# Patient Record
Sex: FEMALE | Race: WHITE | HISPANIC OR LATINO | Employment: OTHER | URBAN - METROPOLITAN AREA
[De-identification: names, ages, dates, MRNs, and addresses within clinical notes are randomized per-mention and may not be internally consistent; named-entity substitution may affect disease eponyms.]

---

## 2017-03-24 LAB
HBA1C MFR BLD: 5.6 % (ref 4.8–5.6)
T3 SERPL-MCNC: 101 NG/DL (ref 71–180)
T4 FREE SERPL-MCNC: 1.24 NG/DL (ref 0.82–1.77)
TSH SERPL DL<=0.005 MIU/L-ACNC: 0.9 UIU/ML (ref 0.45–4.5)

## 2017-04-15 LAB
ALBUMIN SERPL BCP-MCNC: 4.4 G/DL (ref 3.2–4.9)
ALBUMIN/GLOB SERPL: 1.5 G/DL
ALP SERPL-CCNC: 42 U/L (ref 30–99)
ALT SERPL-CCNC: 11 U/L (ref 2–50)
ANION GAP SERPL CALC-SCNC: 8 MMOL/L (ref 0–11.9)
AST SERPL-CCNC: 16 U/L (ref 12–45)
BASOPHILS # BLD AUTO: 0.4 % (ref 0–1.8)
BASOPHILS # BLD: 0.03 K/UL (ref 0–0.12)
BILIRUB SERPL-MCNC: 0.5 MG/DL (ref 0.1–1.5)
BUN SERPL-MCNC: 11 MG/DL (ref 8–22)
CALCIUM SERPL-MCNC: 9.4 MG/DL (ref 8.5–10.5)
CHLORIDE SERPL-SCNC: 106 MMOL/L (ref 96–112)
CO2 SERPL-SCNC: 25 MMOL/L (ref 20–33)
CREAT SERPL-MCNC: 0.99 MG/DL (ref 0.5–1.4)
EOSINOPHIL # BLD AUTO: 0.15 K/UL (ref 0–0.51)
EOSINOPHIL NFR BLD: 2.2 % (ref 0–6.9)
ERYTHROCYTE [DISTWIDTH] IN BLOOD BY AUTOMATED COUNT: 43.3 FL (ref 35.9–50)
GFR SERPL CREATININE-BSD FRML MDRD: 57 ML/MIN/1.73 M 2
GLOBULIN SER CALC-MCNC: 2.9 G/DL (ref 1.9–3.5)
GLUCOSE SERPL-MCNC: 96 MG/DL (ref 65–99)
HCT VFR BLD AUTO: 43.4 % (ref 37–47)
HGB BLD-MCNC: 14.8 G/DL (ref 12–16)
IMM GRANULOCYTES # BLD AUTO: 0.01 K/UL (ref 0–0.11)
IMM GRANULOCYTES NFR BLD AUTO: 0.1 % (ref 0–0.9)
LIPASE SERPL-CCNC: 38 U/L (ref 11–82)
LYMPHOCYTES # BLD AUTO: 2.46 K/UL (ref 1–4.8)
LYMPHOCYTES NFR BLD: 36.6 % (ref 22–41)
MCH RBC QN AUTO: 28.8 PG (ref 27–33)
MCHC RBC AUTO-ENTMCNC: 34.1 G/DL (ref 33.6–35)
MCV RBC AUTO: 84.4 FL (ref 81.4–97.8)
MONOCYTES # BLD AUTO: 0.38 K/UL (ref 0–0.85)
MONOCYTES NFR BLD AUTO: 5.7 % (ref 0–13.4)
NEUTROPHILS # BLD AUTO: 3.69 K/UL (ref 2–7.15)
NEUTROPHILS NFR BLD: 55 % (ref 44–72)
NRBC # BLD AUTO: 0 K/UL
NRBC BLD AUTO-RTO: 0 /100 WBC
PLATELET # BLD AUTO: 260 K/UL (ref 164–446)
PMV BLD AUTO: 10.1 FL (ref 9–12.9)
POTASSIUM SERPL-SCNC: 4.1 MMOL/L (ref 3.6–5.5)
PROT SERPL-MCNC: 7.3 G/DL (ref 6–8.2)
RBC # BLD AUTO: 5.14 M/UL (ref 4.2–5.4)
SODIUM SERPL-SCNC: 139 MMOL/L (ref 135–145)
WBC # BLD AUTO: 6.7 K/UL (ref 4.8–10.8)

## 2017-04-15 PROCEDURE — 80053 COMPREHEN METABOLIC PANEL: CPT

## 2017-04-15 PROCEDURE — 36415 COLL VENOUS BLD VENIPUNCTURE: CPT

## 2017-04-15 PROCEDURE — 99284 EMERGENCY DEPT VISIT MOD MDM: CPT

## 2017-04-15 PROCEDURE — 83690 ASSAY OF LIPASE: CPT

## 2017-04-15 PROCEDURE — 85025 COMPLETE CBC W/AUTO DIFF WBC: CPT

## 2017-04-16 ENCOUNTER — HOSPITAL ENCOUNTER (EMERGENCY)
Facility: MEDICAL CENTER | Age: 62
End: 2017-04-16
Attending: EMERGENCY MEDICINE
Payer: MEDICARE

## 2017-04-16 ENCOUNTER — APPOINTMENT (OUTPATIENT)
Dept: RADIOLOGY | Facility: MEDICAL CENTER | Age: 62
End: 2017-04-16
Attending: EMERGENCY MEDICINE
Payer: MEDICARE

## 2017-04-16 VITALS
WEIGHT: 154.1 LBS | HEART RATE: 60 BPM | HEIGHT: 63 IN | OXYGEN SATURATION: 95 % | DIASTOLIC BLOOD PRESSURE: 76 MMHG | SYSTOLIC BLOOD PRESSURE: 122 MMHG | TEMPERATURE: 99 F | BODY MASS INDEX: 27.3 KG/M2 | RESPIRATION RATE: 18 BRPM

## 2017-04-16 DIAGNOSIS — R10.84 GENERALIZED ABDOMINAL PAIN: ICD-10-CM

## 2017-04-16 DIAGNOSIS — K59.00 CONSTIPATION, UNSPECIFIED CONSTIPATION TYPE: ICD-10-CM

## 2017-04-16 DIAGNOSIS — F41.8 SITUATIONAL ANXIETY: ICD-10-CM

## 2017-04-16 PROCEDURE — A9270 NON-COVERED ITEM OR SERVICE: HCPCS | Performed by: EMERGENCY MEDICINE

## 2017-04-16 PROCEDURE — 74177 CT ABD & PELVIS W/CONTRAST: CPT

## 2017-04-16 PROCEDURE — 700102 HCHG RX REV CODE 250 W/ 637 OVERRIDE(OP): Performed by: EMERGENCY MEDICINE

## 2017-04-16 PROCEDURE — 700117 HCHG RX CONTRAST REV CODE 255: Performed by: EMERGENCY MEDICINE

## 2017-04-16 RX ORDER — DICYCLOMINE HCL 20 MG
20 TABLET ORAL ONCE
Status: COMPLETED | OUTPATIENT
Start: 2017-04-16 | End: 2017-04-16

## 2017-04-16 RX ORDER — DICYCLOMINE HCL 20 MG
20 TABLET ORAL EVERY 6 HOURS
Qty: 30 TAB | Refills: 0 | Status: SHIPPED | OUTPATIENT
Start: 2017-04-16 | End: 2017-04-26

## 2017-04-16 RX ADMIN — DICYCLOMINE HYDROCHLORIDE 20 MG: 20 TABLET ORAL at 02:46

## 2017-04-16 RX ADMIN — IOHEXOL 100 ML: 350 INJECTION, SOLUTION INTRAVENOUS at 02:15

## 2017-04-16 ASSESSMENT — PAIN SCALES - GENERAL: PAINLEVEL_OUTOF10: 8

## 2017-04-16 NOTE — DISCHARGE INSTRUCTIONS
Please follow up with a primary physician for blood pressure management, diabetic screening, and all other preventive health concerns.     THE EXACT CAUSE OF YOUR PAIN IS UNCLEAR--THIS MIGHT BE EARLY IN THE DISEASE PROCESS AND WE ARE UNABLE TO IDENTIFY IT AT THIS TIME (SUCH AS EARLY APPENDICITIS, FOR EXAMPLE).  SEE YOUR DOCTOR OR RETURN TO ER IN THE MORNING (OR 8-12 HRS) UNLESS YOU ARE FEELING COMPLETELY BETTER, RETURN SOONER FOR PAIN/NAUSEA NOT CONTROLLED BY MEDS, FEVER, ANY OTHER CONCERN.    PLENTY OF FLUIDS--PEDIALYTE IS BEST.  ADVANCE DIET AS TOLERATED.    NO ALCOHOL.  NO DRIVING WITH THE MEDICATIONS YOU HAVE BEEN PRESCRIBED.    Dolor abdominal, versión ampliada  (Abdominal Pain, Nonspecific)  El análisis podría no mostrar la razón exacta por la que tiene dolor abdominal. Debido a que hay muchas causas distintas de dolor abdominal, se podrá necesitar otro control y más análisis. Es muy importante el seguimiento para observar los síntomas duraderos (persistentes) o los que empeoran. Demetrius causa posible de dolor abdominal en cualquier persona que aún tiene bobo apéndice es la apendicitis aguda. La apendicitis es a menudo difícil de diagnosticar. Los análisis de angela, orina, ultrasonido y tomografía computada no pueden descartar por completo la apendicitis u otra causas de dolor abdominal. A veces, sólo los cambios que se producen a través del tiempo permitirán determinar si el dolor abdominal se debe al apendicitis o a otras causas. Otros problemas potenciales que pueden requerir cirugía también pueden sebas algún tiempo hasta ser evidentes. Debido a esto, es importante seguir todas las instrucciones de más abajo.  INSTRUCCIONES PARA EL CUIDADO DOMICILIARIO  · Descanse todo lo que pueda.   · No ingiera alimentos sólidos hasta que el dolor desaparezca.   · Cuando un adulto o un chasity siente dolor: Puede beneficiarlo demetrius dieta basada en agua, té liviano descafeinado, caldo o consomé, gelatina, solución de rehidratación  oral, helados de agua o trocitos de hielo.   · Cuando el adulto o el chasity no sienten más dolor: Consuma demetrius dieta liviana (tostadas secas, crackers, jugo de manzana o arroz lewis). Incorpore más alimentos lentamente, siempre que esto no le cause ningún trastorno. No consuma productos lácteos (incluyendo queso y huevos) ni ingiera alimentos condimentados, grasos, fritos o con gran cantidad de fibra.   · No consuma alcohol, cafeína ni cigarrillos.   · [place] rossy medicamentos regularmente, excepto que el profesional le indique lo contrario.   · Utilice los medicamentos de venta juju o de prescripción para el dolor, el malestar o la fiebre, según se lo indique el profesional que lo asiste.   · Utilice los medicamentos de venta juju o de prescripción para el dolor, el malestar o la fiebre, según se lo indique el profesional que lo asiste. No administre aspirina a los niños.   Si el médico le ha dado fecha para demetrius visita de control, es importante que concurra. No cumplir con cory control puede cecil yas resultado que el daño, el dolor o la discapacidad adam permanentes (crónicos). Si tiene problemas para asistir al control, deberá comunicarlo en cory establecimiento para recibir asesoramiento.   SOLICITE ATENCIÓN MÉDICA DE INMEDIATO SI:  · Usted o bobo chasity montes sufrido dolor por más de 24 horas.   · El dolor empeora, cambia de lugar o se siente diferente.   · Usted o bobo chasity tienen demetrius temperatura oral de más de 102° F (38.9° C) y no puede ser controlada con medicamentos.   · Bobo bebé tiene más de 3 meses y bobo temperatura rectal es de 102° F (38.9° C) o más.   · Bobo bebé tiene 3 meses o menos y bobo temperatura rectal es de 100.4° F (38° C) o más.   · Usted o bobo hijo tienen escalofríos.   · Continúan con vómitos y no pueden retener líquidos.   · Observa angela en el vómito o en la materia fecal.   · Las heces son oscuras o negras.   · Los movimientos intestinales son frecuentes.   · Los movimientos intestinales se detienen  (hay demetrius obstrucción) o no pueden eliminarse los gases.   · Siente dolor al orinar o lo hace con frecuencia u observa angela en la orina.   · La piel y la earl nahed de los ojos cambian de color y se tornan amarillos.   · Observa que el estómago se hincha o está más anil.   · Sienten mareos o desmayos.   · Sienten dolor en el pecho o la espalda.   ESTÉ SEGURO QUE:   · Comprende las instrucciones para el jake médica.   · Controlará bobo enfermedad.   · Solicitará atención médica de inmediato según las indicaciones.   Document Released: 03/26/2009 Document Revised: 03/11/2013  Valut[employer]® Patient Information ©2013 [employer].

## 2017-04-16 NOTE — ED NOTES
"Assumed care of patient. Patient complains of epigastric pain x one month and claims \"I don't feel right in my stomach\". Patient claims that when she sits up her stomach moves up.  Patient alert and oriented x 4. Patient denies any other complaints at this time. Patient side rails up x 1 and call bell within reach.    "

## 2017-04-16 NOTE — ED AVS SNAPSHOT
Bsmark Access Code: [email or web address]  Expires: 5/16/2017  2:38 AM    Your email address is not on file at [facility].  Email Addresses are required for you to sign up for Bsmark, please contact [phone] to verify your personal information and to provide your email address prior to attempting to register for Bsmark.    Sena Vazquez LeonIsabel MAZA [address]  [place] [address]    Bsmark  A secure, online tool to manage your health information     [facility]’s Bsmark® is a secure, online tool that connects you to your personalized health information from the privacy of your home -- day or night - making it very easy for you to manage your healthcare. Once the activation process is completed, you can even access your medical information using the Bsmark karan, which is available for free in the Apple Karan store or Google Play store.     To learn more about Bsmark, visit [email or web address]    There are two levels of access available (as shown below):   My Chart Features  [facility] Primary Care Doctor [facility]  Specialists [facility]  Urgent  Care Non-[facility] Primary Care Doctor   Email your healthcare team securely and privately 24/7 X X X    Manage appointments: schedule your next appointment; view details of past/upcoming appointments X      Request prescription refills. X      View recent personal medical records, including lab and immunizations X X X X   View health record, including health history, allergies, medications X X X X   Read reports about your outpatient visits, procedures, consult and ER notes X X X X   See your discharge summary, which is a recap of your hospital and/or ER visit that includes your diagnosis, lab results, and care plan X X  X     How to register for Bsmark:  Once your e-mail address has been verified, follow the following steps to sign up for Bsmark.     1. Go to  [email or web address]  2. Click on the Sign Up Now box, which takes you to the New Member Sign Up page.  You will need to provide the following information:  a. Enter your [email or web address] Access Code exactly as it appears at the top of this page. (You will not need to use this code after you’ve completed the sign-up process. If you do not sign up before the expiration date, you must request a new code.)   b. Enter your date of birth.   c. Enter your home email address.   d. Click Submit, and follow the next screen’s instructions.  3. Create a Evergramt ID. This will be your [email or web address] login ID and cannot be changed, so think of one that is secure and easy to remember.  4. Create a [email or web address].com password. You can change your password at any time.  5. Enter your Password Reset Question and Answer. This can be used at a later time if you forget your password.   6. Enter your e-mail address. This allows you to receive e-mail notifications when new information is available in [email or web address].  7. Click Sign Up. You can now view your health information.    For assistance activating your [email or web address] account, call [phone]

## 2017-04-16 NOTE — ED NOTES
Patient given discharge paperwork and verbalized understanding. Patient out of ED ambulatory with family member. Patient in stable condition and vitals stable and no distress noted.

## 2017-04-16 NOTE — ED AVS SNAPSHOT
4/16/2017    Sena Shepherd  [address]  [facility] [address]    Dear Sena:    [facility] wants to ensure your discharge home is safe and you or your loved ones have had all of your questions answered regarding your care after you leave the hospital.    Below is a list of resources and contact information should you have any questions regarding your hospital stay, follow-up instructions, or active medical symptoms.    Questions or Concerns Regarding… Contact   Medical Questions Related to Your Discharge  (7 days a week, 8am-5pm) Contact a Nurse Care Coordinator   [phone]   Medical Questions Not Related to Your Discharge  (24 hours a day / 7 days a week)  Contact the Nurse Health Line   [phone]    Medications or Discharge Instructions Refer to your discharge packet   or contact your [facility] Primary Care Provider   [phone]   Follow-up Appointment(s) Schedule your appointment via [employer]   or contact Scheduling [phone]   Billing Review your statement via [employer]  or contact Billing [phone]   Medical Records Review your records via [employer]   or contact Medical Records [phone]     You may receive a telephone call within two days of discharge. This call is to make certain you understand your discharge instructions and have the opportunity to have any questions answered. You can also easily access your medical information, test results and upcoming appointments via the [employer] free online health management tool. You can learn more and sign up at Big Contacts/Little Borrowed Dress. For assistance setting up your [employer] account, please call [phone].    Once again, we want to ensure your discharge home is safe and that you have a clear understanding of any next steps in your care. If you have any questions or concerns, please do not hesitate to contact us, we are here for you. Thank you for choosing [facility] for your healthcare needs.    Sincerely,    Your [facility] Healthcare Team

## 2017-04-16 NOTE — ED AVS SNAPSHOT
Home Care Instructions                                                                                                                Sena Shepherd   MRN: [id number]    Department:  [facility], Emergency Dept   Date of Visit:  4/15/2017            [facility], Emergency Dept    [address] [place] [address]    Phone:  [phone]      You were seen by     Paty Horton M.D.      Your Diagnosis Was     Generalized abdominal pain     R10.84       These are the medications you received during your hospitalization from 04/15/2017 2051 to 04/16/2017 0238     Date/Time Order Dose Route Action    04/16/2017 0215 iohexol (OMNIPAQUE) 350 mg/mL 100 mL Intravenous Given      Follow-up Information     1. Follow up with Moses Handy M.D.. Schedule an appointment as soon as possible for a visit in 1 week.    Specialty:  Internal Medicine    Why:  for recheck    Contact information    [address]  [address]  [facility] [address]  [phone]          2. Follow up with [facility], Emergency Dept.    Specialty:  Emergency Medicine    Why:  worsening pain, fever, vomiting or other concerns    Contact information    [address] [facility] [address] [phone]      Medication Information     Review all of your home medications and newly ordered medications with your primary doctor and/or pharmacist as soon as possible. Follow medication instructions as directed by your doctor and/or pharmacist.     Please keep your complete medication list with you and share with your physician. Update the information when medications are discontinued, doses are changed, or new medications (including over-the-counter products) are added; and carry medication information at all times in the event of emergency situations.               Medication List      START taking these medications        Instructions    Morning Afternoon Evening Bedtime    dicyclomine 20 MG Tabs   Commonly known as:  BENTYL        Take 1 Tab by mouth every 6 hours.   Dose:  20 mg                          ASK your doctor about these medications        Instructions    Morning Afternoon Evening Bedtime    albuterol 108 (90 BASE) MCG/ACT Aers inhalation aerosol        Inhale 2 Puffs by mouth every four hours as needed.   Dose:  2 Puff                        ATENOLOL PO        Take  by mouth.                        estradiol 0.0375 MG/24HR patch   Commonly known as:  CLIMARA        Apply 1 Patch to skin as directed every 7 days.   Dose:  1 Patch                        fexofenadine 30 MG tablet   Commonly known as:  ALLEGRA        Take 1 Tab by mouth 2 times a day.   Dose:  30 mg                        lorazepam 1 MG Tabs   Commonly known as:  ATIVAN        Take 1 Tab by mouth every 8 hours as needed for Anxiety.   Dose:  1 mg                        olanzapine 10 MG tablet   Commonly known as:  ZYPREXA        Take 10 mg by mouth every evening.   Dose:  10 mg                        Progesterone Micronized 10 % Crea        Apply  to skin as directed.                        QUETIAPINE FUMARATE PO        Take  by mouth.                        ranitidine 150 MG Tabs   Commonly known as:  ZANTAC        Take 150 mg by mouth 2 times a day.   Dose:  150 mg                        sertraline 25 MG tablet   Commonly known as:  ZOLOFT        Take 25 mg by mouth every day.   Dose:  25 mg                             Where to Get Your Medications      You can get these medications from any pharmacy     Bring a paper prescription for each of these medications    - dicyclomine 20 MG Tabs            Procedures and tests performed during your visit     CARDIAC MONITORING    CBC WITH DIFFERENTIAL    COMP METABOLIC PANEL    CONSENT FOR CONTRAST INJECTION    CT-ABDOMEN-PELVIS WITH    ESTIMATED GFR    IV Saline Lock    LIPASE    O2 Protocol    SALINE LOCK        Discharge Instructions       Please follow up with a  primary physician for blood pressure management, diabetic screening, and all other preventive health concerns.     THE EXACT CAUSE OF YOUR PAIN IS UNCLEAR--THIS MIGHT BE EARLY IN THE DISEASE PROCESS AND WE ARE UNABLE TO IDENTIFY IT AT THIS TIME (SUCH AS EARLY APPENDICITIS, FOR EXAMPLE).  SEE YOUR DOCTOR OR RETURN TO ER IN THE MORNING (OR 8-12 HRS) UNLESS YOU ARE FEELING COMPLETELY BETTER, RETURN SOONER FOR PAIN/NAUSEA NOT CONTROLLED BY MEDS, FEVER, ANY OTHER CONCERN.    PLENTY OF FLUIDS--PEDIALYTE IS BEST.  ADVANCE DIET AS TOLERATED.    NO ALCOHOL.  NO DRIVING WITH THE MEDICATIONS YOU HAVE BEEN PRESCRIBED.    Dolor abdominal, versión ampliada  (Abdominal Pain, Nonspecific)  El análisis podría no mostrar la razón exacta por la que tiene dolor abdominal. Debido a que hay muchas causas distintas de dolor abdominal, se podrá necesitar otro control y más análisis. Es muy importante el seguimiento para observar los síntomas duraderos (persistentes) o los que empeoran. Demetrius causa posible de dolor abdominal en cualquier persona que aún tiene bobo apéndice es la apendicitis aguda. La apendicitis es a menudo difícil de diagnosticar. Los análisis de angela, orina, ultrasonido y tomografía computada no pueden descartar por completo la apendicitis u otra causas de dolor abdominal. A veces, sólo los cambios que se producen a través del tiempo permitirán determinar si el dolor abdominal se debe al apendicitis o a otras causas. Otros problemas potenciales que pueden requerir cirugía también pueden sebas algún tiempo hasta ser evidentes. Debido a esto, es importante seguir todas las instrucciones de más abajo.  INSTRUCCIONES PARA EL CUIDADO DOMICILIARIO  · Descanse todo lo que pueda.   · No ingiera alimentos sólidos hasta que el dolor desaparezca.   · Cuando un adulto o un chasity siente dolor: Puede beneficiarlo demetrius dieta basada en agua, té liviano descafeinado, caldo o consomé, gelatina, solución de rehidratación oral, helados de agua o  trocitos de hielo.   · Cuando el adulto o el chasity no sienten más dolor: Consuma demetrius dieta liviana (tostadas secas, crackers, jugo de manzana o arroz lewis). Incorpore más alimentos lentamente, siempre que esto no le cause ningún trastorno. No consuma productos lácteos (incluyendo queso y huevos) ni ingiera alimentos condimentados, grasos, fritos o con gran cantidad de fibra.   · No consuma alcohol, cafeína ni cigarrillos.   · [place] rossy medicamentos regularmente, excepto que el profesional le indique lo contrario.   · Utilice los medicamentos de venta juju o de prescripción para el dolor, el malestar o la fiebre, según se lo indique el profesional que lo asiste.   · Utilice los medicamentos de venta juju o de prescripción para el dolor, el malestar o la fiebre, según se lo indique el profesional que lo asiste. No administre aspirina a los niños.   Si el médico le ha dado fecha para demetrius visita de control, es importante que concurra. No cumplir con cory control puede cecil yas resultado que el daño, el dolor o la discapacidad adam permanentes (crónicos). Si tiene problemas para asistir al control, deberá comunicarlo en cory establecimiento para recibir asesoramiento.   SOLICITE ATENCIÓN MÉDICA DE INMEDIATO SI:  · Usted o bobo chasity montes sufrido dolor por más de 24 horas.   · El dolor empeora, cambia de lugar o se siente diferente.   · Usted o bobo chasity tienen demetrius temperatura oral de más de 102° F (38.9° C) y no puede ser controlada con medicamentos.   · Bobo bebé tiene más de 3 meses y bobo temperatura rectal es de 102° F (38.9° C) o más.   · Bobo bebé tiene 3 meses o menos y bobo temperatura rectal es de 100.4° F (38° C) o más.   · Usted o bobo hijo tienen escalofríos.   · Continúan con vómitos y no pueden retener líquidos.   · Observa angela en el vómito o en la materia fecal.   · Las heces son oscuras o negras.   · Los movimientos intestinales son frecuentes.   · Los movimientos intestinales se detienen (hay demetrius obstrucción) o no  pueden eliminarse los gases.   · Siente dolor al orinar o lo hace con frecuencia u observa angela en la orina.   · La piel y la earl nahed de los ojos cambian de color y se tornan amarillos.   · Observa que el estómago se hincha o está más anil.   · Sienten mareos o desmayos.   · Sienten dolor en el pecho o la espalda.   ESTÉ SEGURO QUE:   · Comprende las instrucciones para el jake médica.   · Controlará bobo enfermedad.   · Solicitará atención médica de inmediato según las indicaciones.   Document Released: 03/26/2009 Document Revised: 03/11/2013  ExitCare® Patient Information ©2013 [employer].          Patient Information     Patient Information    Following emergency treatment: all patient requiring follow-up care must return either to a private physician or a clinic if your condition worsens before you are able to obtain further medical attention, please return to the emergency room.     Billing Information    At [facility], we work to make the billing process streamlined for our patients.  Our Representatives are here to answer any questions you may have regarding your hospital bill.  If you have insurance coverage and have supplied your insurance information to us, we will submit a claim to your insurer on your behalf.  Should you have any questions regarding your bill, we can be reached online or by phone as follows:  Online: You are able pay your bills online or live chat with our representatives about any billing questions you may have. We are here to help Monday - Friday from 8:00am to 7:30pm and 9:00am - 12:00pm on Saturdays.  Please visit [email or web address]  for more information.   Phone:  [phone] or [phone]    Please note that your emergency physician, surgeon, pathologist, radiologist, anesthesiologist, and other specialists are not employed by [facility] and will therefore bill separately for their services.  Please contact them directly for any  questions concerning their bills at the numbers below:     Emergency Physician Services:  [phone]  [facility]:  [phone]  [facility]:  [phone]  [facility]:  [phone]    1. Your final bill may vary from the amount quoted upon discharge if all procedures are not complete at that time, or if your doctor has additional procedures of which we are not aware. You will receive an additional bill if you return to the Emergency Department at [facility] for suture removal regardless of the facility of which the sutures were placed.     2. Please arrange for settlement of this account at the emergency registration.    3. All self-pay accounts are due in full at the time of treatment.  If you are unable to meet this obligation then payment is expected within 4-5 days.     4. If you have had radiology studies (CT, X-ray, Ultrasound, MRI), you have received a preliminary result during your emergency department visit. Please contact the radiology department [phone] to receive a copy of your final result. Please discuss the Final result with your primary physician or with the follow up physician provided.     Crisis Hotline:  [place] Crisis Hotline:  5-912-QQNVZHI or 1-942.930.4385  [place] Crisis Hotline:    1-893.585.3504 or 247-406-4993         ED Discharge Follow Up Questions    1. In order to provide you with very good care, we would like to follow up with a phone call in the next few days.  May we have your permission to contact you?     YES /  NO    2. What is the best phone number to call you? (       )_____-__________    3. What is the best time to call you?      Morning  /  Afternoon  /  Evening                   Patient Signature:  ____________________________________________________________    Date:  ____________________________________________________________      Your appointments     Apr 26, 2017 10:00 AM   Established Patient with Perla  PAMELA Whalen M.D.   [facility] ([facility])    [address], [address]  [place] [address] [phone]           You will be receiving a confirmation call a few days before your appointment from our automated call confirmation system.

## 2017-04-16 NOTE — ED NOTES
"Sena Vazquez Joao  61 y.o. female  Chief Complaint   Patient presents with   • Abdominal Pain     on and off X 1 year. This episode X 1 wk. Pt describes it as a \"pressure.\"      Pt amb to triage with steady gait for above complaint. Pt states, \"When I lay down it feels like my stomach moves.\" Symptoms began aprox 1 year ago s/p cholecystectomy. Pt denies V/D/C. Per pt, pt experiences nausea when her \"stomach moves.\"  Pt placed in lobby. Pt educated on triage process. Pt encouraged to alert staff for any changes.    "

## 2017-04-16 NOTE — ED PROVIDER NOTES
"ED Provider Note    Scribed for Paty Horton M.D. by Marisela Rausch. 4/16/2017, 12:54 AM.    Primary care provider: Moses Handy M.D.  Means of arrival: Walk-In  History obtained from: Patient  History limited by: None    CHIEF COMPLAINT  Chief Complaint   Patient presents with   • Abdominal Pain     on and off X 1 year. This episode X 1 wk. Pt describes it as a \"pressure.\"      HPI  Sena Shepherd is a 61 y.o. female who presents to the Emergency Department for evaluation of worsening, acute on chronic, abdominal pain.  The patient has suffered from intermittent abdominal pain for the last year.  However, over the last week she has suffered from fairly constant discomfort.  The patient compares her abdominal pain as a heavy pressure.  She describes the sensation to \"everything in her stomach falling to one side\" when she lies down or sits up.  The patient occasionally suffers from constipation. She has not tried to take stool softeners.  The patient has not developed associated nausea, vomiting, fevers, chills, dysuria, or diarrhea.  Just over a year ago the patient underwent a cholecystectomy.  She otherwise has not had abdominal surgeries.  The patient has not met with her gastroenterologist since then.  Her chronic medical history includes GERD, hypertension, and hyperlipidemia.  The patient denies additional symptoms or further pertinent medical history.     REVIEW OF SYSTEMS  Pertinent positives include abdominal discomfort. Pertinent negatives include no nausea, vomiting, fevers, chills, dysuria, diarrhea.  All other systems reviewed and negative. C.     PAST MEDICAL HISTORY   has a past medical history of Arthralgia; Depression; GERD (gastroesophageal reflux disease); Vitamin d deficiency; Menopause; Hypothyroid; Hyperlipidemia; and Hypertension.    SURGICAL HISTORY   has past surgical history that includes cholecystectomy and cholecystectomy (2/2016).    SOCIAL HISTORY  Social " "History   Substance Use Topics   • Smoking status: Never Smoker    • Smokeless tobacco: Never Used   • Alcohol Use: No      History   Drug Use No     FAMILY HISTORY  No family history noted.     CURRENT MEDICATIONS  No current facility-administered medications on file prior to encounter.     Current Outpatient Prescriptions on File Prior to Encounter   Medication Sig Dispense Refill   • Progesterone Micronized 10 % Cream Apply  to skin as directed.     • estradiol (CLIMARA) 0.0375 MG/24HR patch Apply 1 Patch to skin as directed every 7 days.     • olanzapine (ZYPREXA) 10 MG tablet Take 10 mg by mouth every evening.     • sertraline (ZOLOFT) 25 MG tablet Take 25 mg by mouth every day.     • lorazepam (ATIVAN) 1 MG Tab Take 1 Tab by mouth every 8 hours as needed for Anxiety. 10 Tab 0   • albuterol (VENTOLIN OR PROVENTIL) 108 (90 BASE) MCG/ACT Aero Soln inhalation aerosol Inhale 2 Puffs by mouth every four hours as needed. 1 Inhaler 0   • fexofenadine (ALLEGRA) 30 MG tablet Take 1 Tab by mouth 2 times a day. 10 Tab 0   • ranitidine (ZANTAC) 150 MG Tab Take 150 mg by mouth 2 times a day.       ALLERGIES  Allergies   Allergen Reactions   • Nkda [No Known Drug Allergy]      PHYSICAL EXAM  VITAL SIGNS: /91 mmHg  Pulse 73  Temp(Src) 37.2 °C (99 °F) (Temporal)  Resp 18  Ht 1.6 m (5' 3\")  Wt 69.9 kg (154 lb 1.6 oz)  BMI 27.30 kg/m2  SpO2 96%  Constitutional: Alert in no apparent distress.  HENT: No signs of trauma, Bilateral external ears normal, Nose normal.   Eyes: Pupils are equal and reactive, Conjunctiva normal, Non-icteric.   Neck: Normal range of motion, No tenderness, Supple, No stridor.   Cardiovascular: Regular rate and rhythm, no murmurs.   Thorax & Lungs: Normal breath sounds, No respiratory distress, No wheezing, No chest tenderness.   Abdomen: Bowel sounds normal, Soft, no focal tenderness, No masses, No peritoneal signs.  Skin: Warm, Dry, No erythema, No rash.   Musculoskeletal:  No major " deformities noted.   Neurologic: Alert, moving all extremities without difficulty, no focal deficits.    LABS  Results for orders placed or performed during the hospital encounter of 04/16/17   CBC WITH DIFFERENTIAL   Result Value Ref Range    WBC 6.7 4.8 - 10.8 K/uL    RBC 5.14 4.20 - 5.40 M/uL    Hemoglobin 14.8 12.0 - 16.0 g/dL    Hematocrit 43.4 37.0 - 47.0 %    MCV 84.4 81.4 - 97.8 fL    MCH 28.8 27.0 - 33.0 pg    MCHC 34.1 33.6 - 35.0 g/dL    RDW 43.3 35.9 - 50.0 fL    Platelet Count 260 164 - 446 K/uL    MPV 10.1 9.0 - 12.9 fL    Neutrophils-Polys 55.00 44.00 - 72.00 %    Lymphocytes 36.60 22.00 - 41.00 %    Monocytes 5.70 0.00 - 13.40 %    Eosinophils 2.20 0.00 - 6.90 %    Basophils 0.40 0.00 - 1.80 %    Immature Granulocytes 0.10 0.00 - 0.90 %    Nucleated RBC 0.00 /100 WBC    Neutrophils (Absolute) 3.69 2.00 - 7.15 K/uL    Lymphs (Absolute) 2.46 1.00 - 4.80 K/uL    Monos (Absolute) 0.38 0.00 - 0.85 K/uL    Eos (Absolute) 0.15 0.00 - 0.51 K/uL    Baso (Absolute) 0.03 0.00 - 0.12 K/uL    Immature Granulocytes (abs) 0.01 0.00 - 0.11 K/uL    NRBC (Absolute) 0.00 K/uL   COMP METABOLIC PANEL   Result Value Ref Range    Sodium 139 135 - 145 mmol/L    Potassium 4.1 3.6 - 5.5 mmol/L    Chloride 106 96 - 112 mmol/L    Co2 25 20 - 33 mmol/L    Anion Gap 8.0 0.0 - 11.9    Glucose 96 65 - 99 mg/dL    Bun 11 8 - 22 mg/dL    Creatinine 0.99 0.50 - 1.40 mg/dL    Calcium 9.4 8.5 - 10.5 mg/dL    AST(SGOT) 16 12 - 45 U/L    ALT(SGPT) 11 2 - 50 U/L    Alkaline Phosphatase 42 30 - 99 U/L    Total Bilirubin 0.5 0.1 - 1.5 mg/dL    Albumin 4.4 3.2 - 4.9 g/dL    Total Protein 7.3 6.0 - 8.2 g/dL    Globulin 2.9 1.9 - 3.5 g/dL    A-G Ratio 1.5 g/dL   LIPASE   Result Value Ref Range    Lipase 38 11 - 82 U/L   ESTIMATED GFR   Result Value Ref Range    GFR If African American >60 >60 mL/min/1.73 m 2    GFR If Non  57 (A) >60 mL/min/1.73 m 2     All labs reviewed by me.    RADIOLOGY  CT-ABDOMEN-PELVIS WITH   Final Result       1.  No acute findings.   2.  Stable low-density liver lesions, likely cysts.   3.  Uterine fibroid again noted, unchanged.   4.  Normal appendix.        The radiologist's interpretation of all radiological studies have been reviewed by me.    COURSE & MEDICAL DECISION MAKING  Pertinent Labs & Imaging studies reviewed. (See chart for details)    12:54 AM - Patient seen and examined at bedside.  The patient was informed that, due to the chronic nature of her symptoms as well as her description of her symptoms, the patient likely presents with a condition that does not need immediate surgical intervention.  The patient understands that she may not receive an official diagnosis tonight and will need to follow up with a specialist.  However, she was assured that I will order a comprehensive workup to look at the possible causes of her symptoms.  Together we will develop an appropriate plan of care.  All initial questions and concerns were addressed.  The patient understands and agrees with the plan.  Ordered a CT-Abdomen Pelvis, Estimated GFR, CBC, CMP, Lipase, and POC Urinalysis to evaluate her symptoms.     2:09 AM- At this time the patient's ED work up is complete.  I extensively reviewed the lab results, images, and radiologist's interpretations, see above.  The patient will be updated shortly.     2:15 AM - Re-examined; The patient is resting in bed comfortably. The patient's labs are all within normal limits CT scan is unremarkable. I suspect that she has bowel spasm or constipation causing her pain. She seems quite anxious and this may play a role in her symptomatology. I discussed her above findings and plans for discharge with a prescription for Bentyl. The patient was instructed to return to the ED if her symptoms worsen. She will follow up with Dr. Handy in one week.  The patient will receive further information to take home.  All questions and concerns were addressed.  Patient understands and  agrees.       The patient will return for new or worsening symptoms and is stable at the time of discharge. Patient was given return precautions. Patient and/or family member verbalizes understanding and will comply.    DISPOSITION:  Patient will be discharged home in stable condition.    FOLLOW UP:  Moses Handy M.D.  [address]  [address]  [place] [address]  [phone]    Schedule an appointment as soon as possible for a visit in 1 week  for recheck    [facility], Emergency Dept  [address] [address] [phone]    worsening pain, fever, vomiting or other concerns    OUTPATIENT MEDICATIONS:  Discharge Medication List as of 4/16/2017  2:38 AM      START taking these medications    Details   dicyclomine (BENTYL) 20 MG Tab Take 1 Tab by mouth every 6 hours., Disp-30 Tab, R-0, Print Rx Paper           FINAL IMPRESSION  1. Generalized abdominal pain    2. Constipation, unspecified constipation type    3. Situational anxiety         This dictation has been created using voice recognition software and/or scribes. The accuracy of the dictation is limited by the abilities of the software and the expertise of the scribes. I expect there may be some errors of grammar and possibly content. I made every attempt to manually correct the errors within my dictation. However, errors related to voice recognition software and/or scribes may still exist and should be interpreted within the appropriate context.     IMarisela (Scribe), am scribing for, and in the presence of, Paty Horton M.D..    Electronically signed by: Marisela Rausch (Scribe), 4/16/2017    IPaty M.D. personally performed the services described in this documentation, as scribed by Marisela Rausch in my presence, and it is both accurate and complete.    The note accurately reflects work and decisions made by me.  Paty Horton  4/16/2017  4:31 AM

## 2017-04-26 ENCOUNTER — OFFICE VISIT (OUTPATIENT)
Dept: ENDOCRINOLOGY | Facility: MEDICAL CENTER | Age: 62
End: 2017-04-26
Payer: MEDICARE

## 2017-04-26 VITALS
OXYGEN SATURATION: 96 % | BODY MASS INDEX: 27.94 KG/M2 | HEART RATE: 69 BPM | SYSTOLIC BLOOD PRESSURE: 108 MMHG | HEIGHT: 62 IN | WEIGHT: 151.8 LBS | DIASTOLIC BLOOD PRESSURE: 68 MMHG

## 2017-04-26 DIAGNOSIS — E05.90 SUBCLINICAL HYPERTHYROIDISM: ICD-10-CM

## 2017-04-26 PROCEDURE — 1036F TOBACCO NON-USER: CPT | Performed by: INTERNAL MEDICINE

## 2017-04-26 PROCEDURE — G8419 CALC BMI OUT NRM PARAM NOF/U: HCPCS | Performed by: INTERNAL MEDICINE

## 2017-04-26 PROCEDURE — 99214 OFFICE O/P EST MOD 30 MIN: CPT | Performed by: INTERNAL MEDICINE

## 2017-04-26 PROCEDURE — G8432 DEP SCR NOT DOC, RNG: HCPCS | Performed by: INTERNAL MEDICINE

## 2017-04-26 PROCEDURE — 3017F COLORECTAL CA SCREEN DOC REV: CPT | Performed by: INTERNAL MEDICINE

## 2017-04-26 PROCEDURE — 3014F SCREEN MAMMO DOC REV: CPT | Mod: 8P | Performed by: INTERNAL MEDICINE

## 2017-04-26 RX ORDER — MIRTAZAPINE 15 MG/1
15 TABLET, FILM COATED ORAL NIGHTLY
COMMUNITY
End: 2017-05-29

## 2017-04-26 NOTE — PROGRESS NOTES
Endocrinology Clinic Progress Note  PCP: Moses Handy M.D.    CC: Subclinical hyperthyroidism    HPI:  Mireya Pathak is a 62 y.o. old patient who comes in today for routine follow up. At last clinic visit I advise her to discontinue methimazole as she was on a very small dose of 1.25 mg daily and TSH has never been below 0.1. In the past a few months she is randomly taking methimazole on some days and some days she does not take it. She feels that methimazole makes her overall feels better. She feels anxious. She has occasional palpitations.    ROS:  Constitutional: No unintentional weight loss  Cardiac: Positive for palpitations    Past Medical History:  Patient Active Problem List    Diagnosis Date Noted   • Elevated glucose 11/13/2015   • Goiter 11/13/2015   • Hyperlipidemia 09/08/2015   • Sore throat 10/23/2014   • Vitamin D deficiency 07/13/2014   • Chest pain 07/09/2014   • Hyperthyroidism 10/04/2012   • Depression with somatization 11/09/2009       Medications:    Current outpatient prescriptions:   •  mirtazapine (REMERON) 15 MG Tab, Take 15 mg by mouth every evening., Disp: , Rfl:   •  ATENOLOL PO, Take  by mouth., Disp: , Rfl:   •  QUETIAPINE FUMARATE PO, Take  by mouth., Disp: , Rfl:   •  Progesterone Micronized 10 % Cream, Apply  to skin as directed., Disp: , Rfl:   •  albuterol (VENTOLIN OR PROVENTIL) 108 (90 BASE) MCG/ACT Aero Soln inhalation aerosol, Inhale 2 Puffs by mouth every four hours as needed., Disp: 1 Inhaler, Rfl: 0  •  fexofenadine (ALLEGRA) 30 MG tablet, Take 1 Tab by mouth 2 times a day., Disp: 10 Tab, Rfl: 0  •  ranitidine (ZANTAC) 150 MG Tab, Take 150 mg by mouth 2 times a day., Disp: , Rfl:     Labs:   Results for MIREAY PATHAK (MRN [id number]) as of 4/26/2017 11:53   Ref. Range 5/19/2016 04:56 6/16/2016 09:13 3/22/2017 10:10   TSH Latest Ref Range: 0.450-4.500 uIU/mL 0.800 0.874 0.902   Free T-4 Latest Ref Range: 0.82-1.77 ng/dL 0.94 0.98 1.24  "  T3 Latest Ref Range:  ng/dL  115 101     Physical Examination:  Vital signs: /68 mmHg  Pulse 69  Ht 1.575 m (5' 2\")  Wt 68.856 kg (151 lb 12.8 oz)  BMI 27.76 kg/m2  SpO2 96%  General: No apparent distress, cooperative  Eyes: No scleral icterus, no discharge, normal eyelids  Neck: No abnormal masses on inspection   Resp: Normal effort  Psych: Alert and oriented    Assessment and Plan:    1. Subclinical hyperthyroidism  · We had a detailed discussion about treatment of subclinical hyperthyroidism, our plan for now is to discontinue methimazole and repeat labs in 4 weeks, if TSH comes back suppressed and she needs to be on treatment for hyperthyroidism she is leaning towards radioactive iodine ablation, she does not want to keep taking methimazole; we discussed pros and cons of methimazole versus radioactive iodine ablation versus thyroidectomy  · Advised to repeat TSH and free T4 every one month  - TSH; Future  - FREE THYROXINE; Future  - FREE THYROXINE; Future  - TSH; Future    This was a 25 minutes face-to-face encounter, greater than 50% of the time was spent in counseling.    Return in about 2 months (around 6/26/2017).    Thank you for allowing me to participate in the care of this patient.    Perla Whalen M.D.    CC:   Moses Handy M.D.    This note was created using voice recognition software (Dragon). The accuracy of the dictation is limited by the abilities of the software. I have reviewed the note prior to signing, however some errors in grammar and context are still possible. If you have any questions related to this note please do not hesitate to contact our office.     "

## 2017-04-26 NOTE — MR AVS SNAPSHOT
"        Sena Shepherd   2017 10:00 AM   Office Visit   MRN: [id number]    Department:  [facility]   Dept Phone:  [phone]    Description:  Female : 1955   Provider:  Perla Whalen M.D.           Reason for Visit     Follow-Up Hyperthyroidism      Allergies as of 2017     Allergen Noted Reactions    Nkda [No Known Drug Allergy] 2009         You were diagnosed with     Subclinical hyperthyroidism   [014760]         Vital Signs     Blood Pressure Pulse Height Weight Body Mass Index Oxygen Saturation    108/68 mmHg 69 1.575 m (5' 2\") 68.856 kg (151 lb 12.8 oz) 27.76 kg/m2 96%    Smoking Status                   Never Smoker            Basic Information     Date Of Birth Sex Race Ethnicity Preferred Language Language for Written Material    1955 Female White  Origin (Persian,English,Jamaican,Peruvian, etc) English Persian      Your appointments     2017  7:20 AM   Established Patient with Perla Whalen M.D.   [facility]    [address] [facility], [address]  [place] [id number] [phone]           You will be receiving a confirmation call a few days before your appointment from our automated call confirmation system.              Problem List              ICD-10-CM Priority Class Noted - Resolved    Depression with somatization F32.9, F45.0   2009 - Present    Hyperthyroidism E05.90   10/4/2012 - Present    Chest pain (Chronic) R07.9   2014 - Present    Vitamin D deficiency E55.9   2014 - Present    Sore throat J02.9   10/23/2014 - Present    Hyperlipidemia E78.5   2015 - Present    Elevated glucose R73.09   2015 - Present    Goiter E04.9   2015 - Present      Health Maintenance        Date Due Completion Dates    MAMMOGRAM 2010, 2009    COLONOSCOPY 2015    IMM ZOSTER VACCINE 2017 ---    PAP SMEAR 2017 (Prv Comp)   " Override on 7/19/2014: Previously completed    IMM DTaP/Tdap/Td Vaccine (2 - Td) 8/26/2023 8/26/2013            Current Immunizations     Tdap Vaccine 8/26/2013  7:05 PM      Below and/or attached are the medications your provider expects you to take. Review all of your home medications and newly ordered medications with your provider and/or pharmacist. Follow medication instructions as directed by your provider and/or pharmacist. Please keep your medication list with you and share with your provider. Update the information when medications are discontinued, doses are changed, or new medications (including over-the-counter products) are added; and carry medication information at all times in the event of emergency situations     Allergies:  NKDA - (reactions not documented)               Medications  Valid as of: April 26, 2017 - 10:44 AM    Generic Name Brand Name Tablet Size Instructions for use    Albuterol Sulfate (Aero Soln) albuterol 108 (90 BASE) MCG/ACT Inhale 2 Puffs by mouth every four hours as needed.        Atenolol   Take  by mouth.        Dicyclomine HCl (Tab) BENTYL 20 MG Take 1 Tab by mouth every 6 hours.        Estradiol (PATCH WEEKLY) CLIMARA 0.0375 MG/24HR Apply 1 Patch to skin as directed every 7 days.        Fexofenadine HCl (Tab) ALLEGRA 30 MG Take 1 Tab by mouth 2 times a day.        LORazepam (Tab) ATIVAN 1 MG Take 1 Tab by mouth every 8 hours as needed for Anxiety.        Mirtazapine (Tab) REMERON 15 MG Take 15 mg by mouth every evening.        OLANZapine (Tab) ZYPREXA 10 MG Take 10 mg by mouth every evening.        Progesterone Micronized (Cream) Progesterone Micronized 10 % Apply  to skin as directed.        QUEtiapine Fumarate   Take  by mouth.        RaNITidine HCl (Tab) ZANTAC 150 MG Take 150 mg by mouth 2 times a day.        Sertraline HCl (Tab) ZOLOFT 25 MG Take 25 mg by mouth every day.        .                 Medicines prescribed today were sent to:     [id number] - [place]  [place] [address] [place] [address]    Phone: [phone] Fax: [phone]    Open 24 Hours?: No      Medication refill instructions:       If your prescription bottle indicates you have medication refills left, it is not necessary to call your provider’s office. Please contact your pharmacy and they will refill your medication.    If your prescription bottle indicates you do not have any refills left, you may request refills at any time through one of the following ways: The online [employer] system (except Urgent Care), by calling your provider’s office, or by asking your pharmacy to contact your provider’s office with a refill request. Medication refills are processed only during regular business hours and may not be available until the next business day. Your provider may request additional information or to have a follow-up visit with you prior to refilling your medication.   *Please Note: Medication refills are assigned a new Rx number when refilled electronically. Your pharmacy may indicate that no refills were authorized even though a new prescription for the same medication is available at the pharmacy. Please request the medicine by name with the pharmacy before contacting your provider for a refill.        Your To Do List     Future Labs/Procedures Complete By Expires    FREE THYROXINE  As directed 4/26/2018    FREE THYROXINE  As directed 4/26/2018    TSH  As directed 4/26/2018    TSH  As directed 4/26/2018         MyChart Access Code: [email or web address]  Expires: 5/16/2017  2:38 AM    [employer]  A secure, online tool to manage your health information     [employer]® is a secure, online tool that connects you to your personalized health information from the privacy of your home -- day or night - making it very easy for you to manage your healthcare. Once the activation process is completed, you can even access your medical information using  the Snapt karan, which is available for free in the Apple Karan store or Google Play store.     Snapt provides the following levels of access (as shown below):   My Chart Features   [facility] Doctor [facility]  Specialists [facility]  Urgent  Care Non-[facility]  Primary Care  Doctor   Email your healthcare team securely and privately 24/7 X X X    Manage appointments: schedule your next appointment; view details of past/upcoming appointments X      Request prescription refills. X      View recent personal medical records, including lab and immunizations X X X X   View health record, including health history, allergies, medications X X X X   Read reports about your outpatient visits, procedures, consult and ER notes X X X X   See your discharge summary, which is a recap of your hospital and/or ER visit that includes your diagnosis, lab results, and care plan. X X       How to register for Snapt:  1. Go to  [email or web address].  2. Click on the Sign Up Now box, which takes you to the New Member Sign Up page. You will need to provide the following information:  a. Enter your Snapt Access Code exactly as it appears at the top of this page. (You will not need to use this code after you’ve completed the sign-up process. If you do not sign up before the expiration date, you must request a new code.)   b. Enter your date of birth.   c. Enter your home email address.   d. Click Submit, and follow the next screen’s instructions.  3. Create a Snapt ID. This will be your Snapt login ID and cannot be changed, so think of one that is secure and easy to remember.  4. Create a Snapt password. You can change your password at any time.  5. Enter your Password Reset Question and Answer. This can be used at a later time if you forget your password.   6. Enter your e-mail address. This allows you to receive e-mail notifications when new information is available in Snapt.  7. Click Sign Up. You can now view your health  information.    For assistance activating your [employer] account, call [phone]

## 2017-05-22 ENCOUNTER — APPOINTMENT (OUTPATIENT)
Dept: RADIOLOGY | Facility: MEDICAL CENTER | Age: 62
End: 2017-05-22
Attending: PHYSICIAN ASSISTANT
Payer: MEDICARE

## 2017-05-29 ENCOUNTER — HOSPITAL ENCOUNTER (EMERGENCY)
Facility: MEDICAL CENTER | Age: 62
End: 2017-05-29
Attending: EMERGENCY MEDICINE
Payer: MEDICARE

## 2017-05-29 VITALS
OXYGEN SATURATION: 97 % | TEMPERATURE: 98.4 F | HEART RATE: 74 BPM | BODY MASS INDEX: 26.09 KG/M2 | WEIGHT: 147.27 LBS | SYSTOLIC BLOOD PRESSURE: 122 MMHG | RESPIRATION RATE: 14 BRPM | DIASTOLIC BLOOD PRESSURE: 65 MMHG | HEIGHT: 63 IN

## 2017-05-29 DIAGNOSIS — F41.8 SITUATIONAL ANXIETY: ICD-10-CM

## 2017-05-29 DIAGNOSIS — R10.13 EPIGASTRIC ABDOMINAL PAIN: ICD-10-CM

## 2017-05-29 LAB
ALBUMIN SERPL BCP-MCNC: 4.6 G/DL (ref 3.2–4.9)
ALBUMIN/GLOB SERPL: 1.6 G/DL
ALP SERPL-CCNC: 45 U/L (ref 30–99)
ALT SERPL-CCNC: 11 U/L (ref 2–50)
ANION GAP SERPL CALC-SCNC: 11 MMOL/L (ref 0–11.9)
APPEARANCE UR: CLEAR
AST SERPL-CCNC: 14 U/L (ref 12–45)
BASOPHILS # BLD AUTO: 0.4 % (ref 0–1.8)
BASOPHILS # BLD: 0.03 K/UL (ref 0–0.12)
BILIRUB SERPL-MCNC: 1 MG/DL (ref 0.1–1.5)
BILIRUB UR QL STRIP.AUTO: NEGATIVE
BUN SERPL-MCNC: 8 MG/DL (ref 8–22)
CALCIUM SERPL-MCNC: 9.6 MG/DL (ref 8.5–10.5)
CHLORIDE SERPL-SCNC: 103 MMOL/L (ref 96–112)
CO2 SERPL-SCNC: 23 MMOL/L (ref 20–33)
COLOR UR: COLORLESS
CREAT SERPL-MCNC: 0.82 MG/DL (ref 0.5–1.4)
CULTURE IF INDICATED INDCX: NO UA CULTURE
EOSINOPHIL # BLD AUTO: 0.07 K/UL (ref 0–0.51)
EOSINOPHIL NFR BLD: 0.9 % (ref 0–6.9)
ERYTHROCYTE [DISTWIDTH] IN BLOOD BY AUTOMATED COUNT: 41.6 FL (ref 35.9–50)
GFR SERPL CREATININE-BSD FRML MDRD: >60 ML/MIN/1.73 M 2
GLOBULIN SER CALC-MCNC: 2.8 G/DL (ref 1.9–3.5)
GLUCOSE SERPL-MCNC: 111 MG/DL (ref 65–99)
GLUCOSE UR STRIP.AUTO-MCNC: NEGATIVE MG/DL
HCT VFR BLD AUTO: 44 % (ref 37–47)
HGB BLD-MCNC: 15.4 G/DL (ref 12–16)
IMM GRANULOCYTES # BLD AUTO: 0.03 K/UL (ref 0–0.11)
IMM GRANULOCYTES NFR BLD AUTO: 0.4 % (ref 0–0.9)
KETONES UR STRIP.AUTO-MCNC: NEGATIVE MG/DL
LEUKOCYTE ESTERASE UR QL STRIP.AUTO: NEGATIVE
LIPASE SERPL-CCNC: 22 U/L (ref 11–82)
LYMPHOCYTES # BLD AUTO: 1.55 K/UL (ref 1–4.8)
LYMPHOCYTES NFR BLD: 19.8 % (ref 22–41)
MCH RBC QN AUTO: 29.8 PG (ref 27–33)
MCHC RBC AUTO-ENTMCNC: 35 G/DL (ref 33.6–35)
MCV RBC AUTO: 85.3 FL (ref 81.4–97.8)
MICRO URNS: NORMAL
MONOCYTES # BLD AUTO: 0.43 K/UL (ref 0–0.85)
MONOCYTES NFR BLD AUTO: 5.5 % (ref 0–13.4)
NEUTROPHILS # BLD AUTO: 5.71 K/UL (ref 2–7.15)
NEUTROPHILS NFR BLD: 73 % (ref 44–72)
NITRITE UR QL STRIP.AUTO: NEGATIVE
NRBC # BLD AUTO: 0 K/UL
NRBC BLD AUTO-RTO: 0 /100 WBC
PH UR STRIP.AUTO: 6.5 [PH]
PLATELET # BLD AUTO: 271 K/UL (ref 164–446)
PMV BLD AUTO: 9.7 FL (ref 9–12.9)
POTASSIUM SERPL-SCNC: 3.4 MMOL/L (ref 3.6–5.5)
PROT SERPL-MCNC: 7.4 G/DL (ref 6–8.2)
PROT UR QL STRIP: NEGATIVE MG/DL
RBC # BLD AUTO: 5.16 M/UL (ref 4.2–5.4)
RBC UR QL AUTO: NEGATIVE
SODIUM SERPL-SCNC: 137 MMOL/L (ref 135–145)
SP GR UR STRIP.AUTO: 1
TROPONIN I SERPL-MCNC: <0.01 NG/ML (ref 0–0.04)
WBC # BLD AUTO: 7.8 K/UL (ref 4.8–10.8)

## 2017-05-29 PROCEDURE — 85025 COMPLETE CBC W/AUTO DIFF WBC: CPT

## 2017-05-29 PROCEDURE — 84484 ASSAY OF TROPONIN QUANT: CPT

## 2017-05-29 PROCEDURE — 36415 COLL VENOUS BLD VENIPUNCTURE: CPT

## 2017-05-29 PROCEDURE — 700102 HCHG RX REV CODE 250 W/ 637 OVERRIDE(OP): Performed by: EMERGENCY MEDICINE

## 2017-05-29 PROCEDURE — 99284 EMERGENCY DEPT VISIT MOD MDM: CPT

## 2017-05-29 PROCEDURE — 81003 URINALYSIS AUTO W/O SCOPE: CPT

## 2017-05-29 PROCEDURE — 83690 ASSAY OF LIPASE: CPT

## 2017-05-29 PROCEDURE — 80053 COMPREHEN METABOLIC PANEL: CPT

## 2017-05-29 PROCEDURE — A9270 NON-COVERED ITEM OR SERVICE: HCPCS | Performed by: EMERGENCY MEDICINE

## 2017-05-29 RX ORDER — LAMOTRIGINE 25 MG/1
50 TABLET ORAL DAILY
Status: SHIPPED | COMMUNITY
End: 2023-01-10

## 2017-05-29 RX ORDER — DICYCLOMINE HYDROCHLORIDE 10 MG/1
10 CAPSULE ORAL
COMMUNITY
End: 2019-03-27

## 2017-05-29 RX ORDER — HYDROCODONE BITARTRATE AND ACETAMINOPHEN 5; 325 MG/1; MG/1
1-2 TABLET ORAL EVERY 6 HOURS PRN
Qty: 20 TAB | Refills: 0 | Status: SHIPPED | OUTPATIENT
Start: 2017-05-29 | End: 2019-03-27

## 2017-05-29 RX ORDER — LURASIDONE HYDROCHLORIDE 40 MG/1
40 TABLET, FILM COATED ORAL 2 TIMES DAILY WITH MEALS
COMMUNITY
End: 2019-03-27

## 2017-05-29 RX ORDER — ATENOLOL 25 MG/1
25 TABLET ORAL DAILY
Status: SHIPPED | COMMUNITY
End: 2022-07-17

## 2017-05-29 RX ORDER — OMEPRAZOLE 20 MG/1
20 CAPSULE, DELAYED RELEASE ORAL DAILY
COMMUNITY
End: 2019-06-08

## 2017-05-29 RX ORDER — HYDROCODONE BITARTRATE AND ACETAMINOPHEN 5; 325 MG/1; MG/1
1 TABLET ORAL ONCE
Status: COMPLETED | OUTPATIENT
Start: 2017-05-29 | End: 2017-05-29

## 2017-05-29 RX ORDER — SUCRALFATE 1 G/1
1 TABLET ORAL
Qty: 120 TAB | Refills: 3 | Status: SHIPPED | OUTPATIENT
Start: 2017-05-29 | End: 2019-03-27

## 2017-05-29 RX ORDER — SUCRALFATE 1 G/1
1 TABLET ORAL
Qty: 120 TAB | Refills: 3 | Status: SHIPPED | OUTPATIENT
Start: 2017-05-29 | End: 2017-05-29

## 2017-05-29 RX ADMIN — HYDROCODONE BITARTRATE AND ACETAMINOPHEN 1 TABLET: 5; 325 TABLET ORAL at 08:18

## 2017-05-29 ASSESSMENT — ENCOUNTER SYMPTOMS: ABDOMINAL PAIN: 1

## 2017-05-29 ASSESSMENT — PAIN SCALES - GENERAL
PAINLEVEL_OUTOF10: 7
PAINLEVEL_OUTOF10: 9
PAINLEVEL_OUTOF10: 8

## 2017-05-29 NOTE — DISCHARGE INSTRUCTIONS
Return to the ER for more pain, vomiting, fever or other concerns.  Follow up with her doctor. Take medicines as prescribed.  Drink plenty of fluids.    Dolor abdominal, versión ampliada  (Abdominal Pain, Nonspecific)  El análisis podría no mostrar la razón exacta por la que tiene dolor abdominal. Debido a que hay muchas causas distintas de dolor abdominal, se podrá necesitar otro control y más análisis. Es muy importante el seguimiento para observar los síntomas duraderos (persistentes) o los que empeoran. Demetrius causa posible de dolor abdominal en cualquier persona que aún tiene bobo apéndice es la apendicitis aguda. La apendicitis es a menudo difícil de diagnosticar. Los análisis de angela, orina, ultrasonido y tomografía computada no pueden descartar por completo la apendicitis u otra causas de dolor abdominal. A veces, sólo los cambios que se producen a través del tiempo permitirán determinar si el dolor abdominal se debe al apendicitis o a otras causas. Otros problemas potenciales que pueden requerir cirugía también pueden sebas algún tiempo hasta ser evidentes. Debido a esto, es importante seguir todas las instrucciones de más abajo.  INSTRUCCIONES PARA EL CUIDADO DOMICILIARIO  · Descanse todo lo que pueda.   · No ingiera alimentos sólidos hasta que el dolor desaparezca.   · Cuando un adulto o un chasity siente dolor: Puede beneficiarlo demetrius dieta basada en agua, té liviano descafeinado, caldo o consomé, gelatina, solución de rehidratación oral, helados de agua o trocitos de hielo.   · Cuando el adulto o el chasity no sienten más dolor: Consuma demetrius dieta liviana (tostadas secas, crackers, jugo de manzana o arroz lewis). Incorpore más alimentos lentamente, siempre que esto no le cause ningún trastorno. No consuma productos lácteos (incluyendo queso y huevos) ni ingiera alimentos condimentados, grasos, fritos o con gran cantidad de fibra.   · No consuma alcohol, cafeína ni cigarrillos.   · [place] rossy medicamentos  regularmente, excepto que el profesional le indique lo contrario.   · Utilice los medicamentos de venta juju o de prescripción para el dolor, el malestar o la fiebre, según se lo indique el profesional que lo asiste.   · Utilice los medicamentos de venta juju o de prescripción para el dolor, el malestar o la fiebre, según se lo indique el profesional que lo asiste. No administre aspirina a los niños.   Si el médico le ha dado fecha para demetrius visita de control, es importante que concurra. No cumplir con cory control puede cecil yas resultado que el daño, el dolor o la discapacidad adam permanentes (crónicos). Si tiene problemas para asistir al control, deberá comunicarlo en cory establecimiento para recibir asesoramiento.   SOLICITE ATENCIÓN MÉDICA DE INMEDIATO SI:  · Usted o bobo chasity montes sufrido dolor por más de 24 horas.   · El dolor empeora, cambia de lugar o se siente diferente.   · Usted o bobo chasity tienen demetrius temperatura oral de más de 102° F (38.9° C) y no puede ser controlada con medicamentos.   · Bobo bebé tiene más de 3 meses y bobo temperatura rectal es de 102° F (38.9° C) o más.   · Bobo bebé tiene 3 meses o menos y bobo temperatura rectal es de 100.4° F (38° C) o más.   · Usted o bobo hijo tienen escalofríos.   · Continúan con vómitos y no pueden retener líquidos.   · Observa angela en el vómito o en la materia fecal.   · Las heces son oscuras o negras.   · Los movimientos intestinales son frecuentes.   · Los movimientos intestinales se detienen (hay demetrius obstrucción) o no pueden eliminarse los gases.   · Siente dolor al orinar o lo hace con frecuencia u observa angela en la orina.   · La piel y la earl nahed de los ojos cambian de color y se tornan amarillos.   · Observa que el estómago se hincha o está más anil.   · Sienten mareos o desmayos.   · Sienten dolor en el pecho o la espalda.   ESTÉ SEGURO QUE:   · Comprende las instrucciones para el jake médica.   · Controlará bobo enfermedad.   · Solicitará atención  médica de inmediato según las indicaciones.   Document Released: 03/26/2009 Document Revised: 03/11/2013  ExitCare® Patient Information ©2013 [employer].

## 2017-05-29 NOTE — ED NOTES
Pt to rm 6 c/o epigastric pain continuous x 1 month. Pt seen multiple times for same s/s. Pt states she has been seen at [facility] and by [facility]. Dx with IBS but complains of continued pain and nausea

## 2017-05-29 NOTE — ED PROVIDER NOTES
ED Provider Note    Scribed for Darron Reece M.D. by David Rosas. 5/29/2017, 7:51 AM.    Primary care provider: Moses Handy M.D.  Means of arrival: Walk-in  History obtained from: Patient  History limited by: None    CHIEF COMPLAINT  Chief Complaint   Patient presents with   • Epigastric Pain     x3 days, abdominal pain   • Nausea     x3 days       HPI  Sena Shepherd is a 62 y.o. female who presents to the Emergency Department complaining of abdominal pain. The pain has been present for a long period of time, more than he urged that extensive workup including cholecystectomy and endoscopy.  The patient is seemingly worsening over the last month or so.  She has not had anorexia or vomiting or diarrhea.  No hematemesis, melena, hematochezia.  Eating makes the pain better.  Nothing specifically makes it worse.  She states that her pain is constant. It is severe enough to disturb her sleep. She states that her pain is similar in quality to when she is hungry. She has been seen several times for the same symptoms, but has never received medication when seen here. The patient received a prescription for IBS medication from [facility] and was told to eat around twelve times a day to alleviate her symptoms. She had a cholecystectomy about one year ago. Patient has had a breath test for H. Pylori taken previously. She also had a gastroscopy nine months ago, which revealed mild inflammation and redness. Patient has no history of thyroid problems, hypertension, or diabetes mellitus.    REVIEW OF SYSTEMS  Review of Systems   Gastrointestinal: Positive for abdominal pain.   All other systems reviewed and are negative.  C    PAST MEDICAL HISTORY   has a past medical history of Arthralgia; Depression; GERD (gastroesophageal reflux disease); Vitamin d deficiency; Menopause; Hypothyroid; Hyperlipidemia; and Hypertension.    SURGICAL HISTORY   has past surgical history that includes  "cholecystectomy and cholecystectomy (2/2016).    SOCIAL HISTORY  Social History   Substance Use Topics   • Smoking status: Never Smoker    • Smokeless tobacco: Never Used   • Alcohol Use: No      History   Drug Use No       FAMILY HISTORY  No family history reported.    CURRENT MEDICATIONS  Home Medications     Reviewed by Lor Brambila R.N. (Registered Nurse) on 05/29/17 at 0730  Med List Status: Partial    Medication Last Dose Status    atenolol (TENORMIN) 25 MG Tab 5/29/2017 Active    dicyclomine (BENTYL) 10 MG Cap 5/29/2017 Active    fexofenadine (ALLEGRA) 30 MG tablet PRN Active    lamotrigine (LAMICTAL) 25 MG Tab 5/29/2017 Active    lurasidone (LATUDA) 40 MG Tab 5/28/2017 Active    omeprazole (PRILOSEC) 20 MG delayed-release capsule  Active                ALLERGIES  Allergies   Allergen Reactions   • Nkda [No Known Drug Allergy]        PHYSICAL EXAM  VITAL SIGNS: /70 mmHg  Pulse 73  Temp(Src) 36.9 °C (98.4 °F)  Resp 20  Ht 1.6 m (5' 2.99\")  Wt 66.8 kg (147 lb 4.3 oz)  BMI 26.09 kg/m2  SpO2 96%  Vitals reviewed.  Constitutional: Well developed, Well nourished, No acute distress, Non-toxic appearance.   HENT: Normocephalic, Atraumatic, Bilateral external ears normal, Oropharynx moist, No oral exudates, Nose normal.   Eyes: PERRL, EOMI, Conjunctiva normal, No discharge.   Neck: Normal range of motion, No tenderness, Supple, No stridor.   Cardiovascular: Normal heart rate, Normal rhythm, No murmurs, No rubs, No gallops.   Thorax & Lungs: Normal breath sounds, No respiratory distress, No wheezing, No chest tenderness.   Abdomen: Bowel sounds normal, Soft, No tenderness  Skin: Warm, Dry, No erythema, No rash.   Back: No tenderness, No CVA tenderness.   Musculoskeletal: Good range of motion in all major joints.   Neurologic: Alert, No focal deficits noted.   Psychiatric: Affect normal    LABS  Results for orders placed or performed during the hospital encounter of 05/29/17   CBC WITH DIFFERENTIAL "   Result Value Ref Range    WBC 7.8 4.8 - 10.8 K/uL    RBC 5.16 4.20 - 5.40 M/uL    Hemoglobin 15.4 12.0 - 16.0 g/dL    Hematocrit 44.0 37.0 - 47.0 %    MCV 85.3 81.4 - 97.8 fL    MCH 29.8 27.0 - 33.0 pg    MCHC 35.0 33.6 - 35.0 g/dL    RDW 41.6 35.9 - 50.0 fL    Platelet Count 271 164 - 446 K/uL    MPV 9.7 9.0 - 12.9 fL    Neutrophils-Polys 73.00 (H) 44.00 - 72.00 %    Lymphocytes 19.80 (L) 22.00 - 41.00 %    Monocytes 5.50 0.00 - 13.40 %    Eosinophils 0.90 0.00 - 6.90 %    Basophils 0.40 0.00 - 1.80 %    Immature Granulocytes 0.40 0.00 - 0.90 %    Nucleated RBC 0.00 /100 WBC    Neutrophils (Absolute) 5.71 2.00 - 7.15 K/uL    Lymphs (Absolute) 1.55 1.00 - 4.80 K/uL    Monos (Absolute) 0.43 0.00 - 0.85 K/uL    Eos (Absolute) 0.07 0.00 - 0.51 K/uL    Baso (Absolute) 0.03 0.00 - 0.12 K/uL    Immature Granulocytes (abs) 0.03 0.00 - 0.11 K/uL    NRBC (Absolute) 0.00 K/uL   COMP METABOLIC PANEL   Result Value Ref Range    Sodium 137 135 - 145 mmol/L    Potassium 3.4 (L) 3.6 - 5.5 mmol/L    Chloride 103 96 - 112 mmol/L    Co2 23 20 - 33 mmol/L    Anion Gap 11.0 0.0 - 11.9    Glucose 111 (H) 65 - 99 mg/dL    Bun 8 8 - 22 mg/dL    Creatinine 0.82 0.50 - 1.40 mg/dL    Calcium 9.6 8.5 - 10.5 mg/dL    AST(SGOT) 14 12 - 45 U/L    ALT(SGPT) 11 2 - 50 U/L    Alkaline Phosphatase 45 30 - 99 U/L    Total Bilirubin 1.0 0.1 - 1.5 mg/dL    Albumin 4.6 3.2 - 4.9 g/dL    Total Protein 7.4 6.0 - 8.2 g/dL    Globulin 2.8 1.9 - 3.5 g/dL    A-G Ratio 1.6 g/dL   LIPASE   Result Value Ref Range    Lipase 22 11 - 82 U/L   URINALYSIS CULTURE, IF INDICATED   Result Value Ref Range    Color Colorless     Character Clear     Specific Gravity 1.004 <1.035    Ph 6.5 5.0-8.0    Glucose Negative Negative mg/dL    Ketones Negative Negative mg/dL    Protein Negative Negative mg/dL    Bilirubin Negative Negative    Nitrite Negative Negative    Leukocyte Esterase Negative Negative    Occult Blood Negative Negative    Micro Urine Req see below     Culture  "Indicated No UA Culture   ESTIMATED GFR   Result Value Ref Range    GFR If African American >60 >60 mL/min/1.73 m 2    GFR If Non African American >60 >60 mL/min/1.73 m 2   TROPONIN   Result Value Ref Range    Troponin I <0.01 0.00 - 0.04 ng/mL     All labs reviewed by me.    COURSE & MEDICAL DECISION MAKING  Pertinent Labs & Imaging studies reviewed. (See chart for details)    Obtained and reviewed past medical records from previous.  His previous workup.  His labs and previous imaging.    7:51 AM Patient seen and examined at bedside. The patient presents with epigastric abdominal pain for fairly long duration., and the differential diagnosis includes but is not limited to gastritis, pancreatitis, recurrent gallstones, chronic pain. rdered for CBC with differential, CMP, Lipase, and U/A culture if indicated to evaluate. Patient will be treated with Norco 5-325 mg tablet for her symptoms.      9:26 AM I ordered a Troponin to evaluate.  The patient is a pain for a month, chronically.  A troponin is negative.  Her pain clearly sounds abdominal exam previous EKGs, which are nonspecific.  I don't think she is the case today.  This is cardiac.  Elevated troponin.  Lipase, CBC and CMP are normal.  Urinalysis is negative.    10:18 AM Review of ED tests reveal unremarkable results.    10:18 AM - I discussed her above findings were overall unremarkable and plans for discharge with a prescription for Norco and Carafate. She was instructed to return to the ED if her symptoms worsen. Patient understands and agrees. Her vitals prior to discharge are: /70 mmHg  Pulse 73  Temp(Src) 36.9 °C (98.4 °F)  Resp 20  Ht 1.6 m (5' 2.99\")  Wt 66.8 kg (147 lb 4.3 oz)  BMI 26.09 kg/m2  SpO2 96%      Patient is given some oral fluids and a by mouth challenge and vital signs are normal, she'll be discharged home.  Abdominal exam is completely benign without tenderness or peritonitis.  She Norco extensively.  He seems somewhat " anxious and nervous that he was a component of this anxiety.  She is requesting a prescription for pain medicines.  She can have some pain medicines until she can see her doctor.  We'll also put her on Carafate.  At this point, there is no signs of acute surgical process.  I think she requires admission or any imaging at this time.  As this is a recurrent biliary stone, recent CT was unremarkable.  Her abdomen is benign.  She is comfortable with the plan.    I reviewed prescription monitoring program for patient's narcotic use before prescribing a scheduled drug.The patient will not drink alcohol nor drive with prescribed medications. The patient will return for new or worsening symptoms and is stable at the time of discharge.    DISPOSITION:  Patient will be discharged home in stable condition.    FOLLOW UP:  Return to ED if symptoms worsen.    OUTPATIENT MEDICATIONS:  New Prescriptions    HYDROCODONE-ACETAMINOPHEN (NORCO) 5-325 MG TAB PER TABLET    Take 1-2 Tabs by mouth every 6 hours as needed.    SUCRALFATE (CARAFATE) 1 GM TAB    Take 1 Tab by mouth 4 Times a Day,Before Meals and at Bedtime.     FINAL IMPRESSION  1. Epigastric abdominal pain    2. Situational anxiety        David VIDAL (Vivekibmary), am scribing for, and in the presence of, Darron Reece M.D..    Electronically signed by: David Rosas (Jaime), 5/29/2017    Darron VIDAL M.D. personally performed the services described in this documentation, as scribed by David Rosas in my presence, and it is both accurate and complete.    The note accurately reflects work and decisions made by me.  Darron Reece  5/29/2017  10:26 AM

## 2017-05-29 NOTE — ED NOTES
Patient ambulatory to triage with frient:  Chief Complaint   Patient presents with   • Epigastric Pain     x3 days, abdominal pain   • Nausea     x3 days     Last intake at 0300, pain relieved with food.  LBM today.  Pt reports seen at [facility][1] on Thursday, prescribed bentyl, patient taking medication as prescribed with no relief.  Pt reports cholecystectomy last year.      Explained wait time and triage process to pt. Pt placed back out in lobby, told to notify ED tech or triage RN of any changes.

## 2017-05-29 NOTE — ED NOTES
Iv attempt unsuccessful by medic, will run labs and attempt further IV access if needed after results

## 2017-05-29 NOTE — ED AVS SNAPSHOT
Coupsta Access Code: [email or web address]  Expires: 6/28/2017 10:47 AM    Your email address is not on file at [facility].  Email Addresses are required for you to sign up for Coupsta, please contact [phone] to verify your personal information and to provide your email address prior to attempting to register for Coupsta.    Sena Vazquez LeonIsabel MAZA [address]  [place] [address]    Coupsta  A secure, online tool to manage your health information     [facility]’s Coupsta® is a secure, online tool that connects you to your personalized health information from the privacy of your home -- day or night - making it very easy for you to manage your healthcare. Once the activation process is completed, you can even access your medical information using the Coupsta karan, which is available for free in the Apple Karan store or Google Play store.     To learn more about Coupsta, visit [email or web address]    There are two levels of access available (as shown below):   My Chart Features  [facility] Primary Care Doctor [facility]  Specialists [facility]  Urgent  Care Non-[facility] Primary Care Doctor   Email your healthcare team securely and privately 24/7 X X X    Manage appointments: schedule your next appointment; view details of past/upcoming appointments X      Request prescription refills. X      View recent personal medical records, including lab and immunizations X X X X   View health record, including health history, allergies, medications X X X X   Read reports about your outpatient visits, procedures, consult and ER notes X X X X   See your discharge summary, which is a recap of your hospital and/or ER visit that includes your diagnosis, lab results, and care plan X X  X     How to register for Coupsta:  Once your e-mail address has been verified, follow the following steps to sign up for Coupsta.     1. Go to  [email or web address]  2. Click on the Sign Up Now box, which takes you to the New Member Sign Up page.  You will need to provide the following information:  a. Enter your goBramble Access Code exactly as it appears at the top of this page. (You will not need to use this code after you’ve completed the sign-up process. If you do not sign up before the expiration date, you must request a new code.)   b. Enter your date of birth.   c. Enter your home email address.   d. Click Submit, and follow the next screen’s instructions.  3. Create a Lockitront ID. This will be your goBramble login ID and cannot be changed, so think of one that is secure and easy to remember.  4. Create a goBramble password. You can change your password at any time.  5. Enter your Password Reset Question and Answer. This can be used at a later time if you forget your password.   6. Enter your e-mail address. This allows you to receive e-mail notifications when new information is available in goBramble.  7. Click Sign Up. You can now view your health information.    For assistance activating your goBramble account, call [phone]

## 2017-05-29 NOTE — ED AVS SNAPSHOT
5/29/2017    Sena Lowecherie Shepherd  [address]  [facility] [address]    Dear Sena:    [facility] wants to ensure your discharge home is safe and you or your loved ones have had all of your questions answered regarding your care after you leave the hospital.    Below is a list of resources and contact information should you have any questions regarding your hospital stay, follow-up instructions, or active medical symptoms.    Questions or Concerns Regarding… Contact   Medical Questions Related to Your Discharge  (7 days a week, 8am-5pm) Contact a Nurse Care Coordinator   [phone]   Medical Questions Not Related to Your Discharge  (24 hours a day / 7 days a week)  Contact the Nurse Health Line   [phone]    Medications or Discharge Instructions Refer to your discharge packet   or contact your [facility] Primary Care Provider   [phone]   Follow-up Appointment(s) Schedule your appointment via Xiami [employer]   or contact Scheduling [phone]   Billing Review your statement via [employer]  or contact Billing [phone]   Medical Records Review your records via [employer]   or contact Medical Records [phone]     You may receive a telephone call within two days of discharge. This call is to make certain you understand your discharge instructions and have the opportunity to have any questions answered. You can also easily access your medical information, test results and upcoming appointments via the Xiami Radio free online health management tool. You can learn more and sign up at [facility]/[employer]. For assistance setting up your Xiami [employer] account, please call [phone].    Once again, we want to ensure your discharge home is safe and that you have a clear understanding of any next steps in your care. If you have any questions or concerns, please do not hesitate to contact us, we are here for you. Thank you for choosing [facility] for your healthcare needs.    Sincerely,    Your [facility] Healthcare Team

## 2017-05-29 NOTE — ED AVS SNAPSHOT
Home Care Instructions                                                                                                                Sena Shepherd   MRN: [id number]    Department:  [facility], Emergency Dept   Date of Visit:  5/29/2017            [facility], Emergency Dept    [address]    [place] [address]    Phone:  [phone]      You were seen by     Darron Reece M.D.      Your Diagnosis Was     Epigastric abdominal pain     R10.13       These are the medications you received during your hospitalization from 05/29/2017 0627 to 05/29/2017 1047     Date/Time Order Dose Route Action    05/29/2017 0818 hydrocodone-acetaminophen (NORCO) 5-325 MG per tablet 1 Tab 1 Tab Oral Given      Follow-up Information     1. Follow up with Your Physician.    Specialty:  Emergency Medicine    Contact information    Varies        Medication Information     Review all of your home medications and newly ordered medications with your primary doctor and/or pharmacist as soon as possible. Follow medication instructions as directed by your doctor and/or pharmacist.     Please keep your complete medication list with you and share with your physician. Update the information when medications are discontinued, doses are changed, or new medications (including over-the-counter products) are added; and carry medication information at all times in the event of emergency situations.               Medication List      START taking these medications        Instructions    Morning Afternoon Evening Bedtime    hydrocodone-acetaminophen 5-325 MG Tabs per tablet   Last time this was given:  1 Tab on 5/29/2017  8:18 AM   Commonly known as:  NORCO        Take 1-2 Tabs by mouth every 6 hours as needed.   Dose:  1-2 Tab                        sucralfate 1 GM Tabs   Commonly known as:  CARAFATE        Take 1 Tab by mouth 4 Times a Day,Before Meals and at Bedtime.   Dose:  1 g                          ASK your doctor about these medications        Instructions    Morning Afternoon Evening Bedtime    atenolol 25 MG Tabs   Commonly known as:  TENORMIN        Take 25 mg by mouth every day.   Dose:  25 mg                        dicyclomine 10 MG Caps   Commonly known as:  BENTYL        Take 10 mg by mouth 4 Times a Day,Before Meals and at Bedtime.   Dose:  10 mg                        fexofenadine 30 MG tablet   Commonly known as:  ALLEGRA        Take 1 Tab by mouth 2 times a day.   Dose:  30 mg                        lamotrigine 25 MG Tabs   Commonly known as:  LAMICTAL        Take 25 mg by mouth every day.   Dose:  25 mg                        LATUDA 40 MG Tabs   Generic drug:  lurasidone        Take 40 mg by mouth 2 times a day, with meals.   Dose:  40 mg                        omeprazole 20 MG delayed-release capsule   Commonly known as:  PRILOSEC        Take 20 mg by mouth every day.   Dose:  20 mg                             Where to Get Your Medications      You can get these medications from any pharmacy     Bring a paper prescription for each of these medications    - hydrocodone-acetaminophen 5-325 MG Tabs per tablet  - sucralfate 1 GM Tabs            Procedures and tests performed during your visit     CBC WITH DIFFERENTIAL    COMP METABOLIC PANEL    ESTIMATED GFR    IV SALINE LOCK    LIPASE    NURSING COMMUNICATION    TROPONIN    URINALYSIS CULTURE, IF INDICATED        Discharge Instructions       Return to the ER for more pain, vomiting, fever or other concerns.  Follow up with her doctor. Take medicines as prescribed.  Drink plenty of fluids.    Dolor abdominal, versión ampliada  (Abdominal Pain, Nonspecific)  El análisis podría no mostrar la razón exacta por la que tiene dolor abdominal. Debido a que hay muchas causas distintas de dolor abdominal, se podrá necesitar otro control y más análisis. Es muy importante el seguimiento para observar los síntomas duraderos (persistentes) o los que  empeoran. Demetrius causa posible de dolor abdominal en cualquier persona que aún tiene bobo apéndice es la apendicitis aguda. La apendicitis es a menudo difícil de diagnosticar. Los análisis de angela, orina, ultrasonido y tomografía computada no pueden descartar por completo la apendicitis u otra causas de dolor abdominal. A veces, sólo los cambios que se producen a través del tiempo permitirán determinar si el dolor abdominal se debe al apendicitis o a otras causas. Otros problemas potenciales que pueden requerir cirugía también pueden sebas algún tiempo hasta ser evidentes. Debido a esto, es importante seguir todas las instrucciones de más abajo.  INSTRUCCIONES PARA EL CUIDADO DOMICILIARIO  · Descanse todo lo que pueda.   · No ingiera alimentos sólidos hasta que el dolor desaparezca.   · Cuando un adulto o un chasity siente dolor: Puede beneficiarlo demetrius dieta basada en agua, té liviano descafeinado, caldo o consomé, gelatina, solución de rehidratación oral, helados de agua o trocitos de hielo.   · Cuando el adulto o el chasity no sienten más dolor: Consuma demetrius dieta liviana (tostadas secas, crackers, jugo de manzana o arroz lewis). Incorpore más alimentos lentamente, siempre que esto no le cause ningún trastorno. No consuma productos lácteos (incluyendo queso y huevos) ni ingiera alimentos condimentados, grasos, fritos o con gran cantidad de fibra.   · No consuma alcohol, cafeína ni cigarrillos.   · [place] rossy medicamentos regularmente, excepto que el profesional le indique lo contrario.   · Utilice los medicamentos de venta juju o de prescripción para el dolor, el malestar o la fiebre, según se lo indique el profesional que lo asiste.   · Utilice los medicamentos de venta juju o de prescripción para el dolor, el malestar o la fiebre, según se lo indique el profesional que lo asiste. No administre aspirina a los niños.   Si el médico le ha dado fecha para demetrius visita de control, es importante que concurra. No cumplir con cory  control puede cecil yas resultado que el daño, el dolor o la discapacidad adam permanentes (crónicos). Si tiene problemas para asistir al control, deberá comunicarlo en cory establecimiento para recibir asesoramiento.   SOLICITE ATENCIÓN MÉDICA DE INMEDIATO SI:  · Usted o bobo chasity montes sufrido dolor por más de 24 horas.   · El dolor empeora, cambia de lugar o se siente diferente.   · Usted o bobo chasity tienen demetrius temperatura oral de más de 102° F (38.9° C) y no puede ser controlada con medicamentos.   · Bobo bebé tiene más de 3 meses y bobo temperatura rectal es de 102° F (38.9° C) o más.   · Bobo bebé tiene 3 meses o menos y bobo temperatura rectal es de 100.4° F (38° C) o más.   · Usted o bobo hijo tienen escalofríos.   · Continúan con vómitos y no pueden retener líquidos.   · Observa angela en el vómito o en la materia fecal.   · Las heces son oscuras o negras.   · Los movimientos intestinales son frecuentes.   · Los movimientos intestinales se detienen (hay demetrius obstrucción) o no pueden eliminarse los gases.   · Siente dolor al orinar o lo hace con frecuencia u observa angela en la orina.   · La piel y la earl nahed de los ojos cambian de color y se tornan amarillos.   · Observa que el estómago se hincha o está más anil.   · Sienten mareos o desmayos.   · Sienten dolor en el pecho o la espalda.   ESTÉ SEGURO QUE:   · Comprende las instrucciones para el jake médica.   · Controlará bobo enfermedad.   · Solicitará atención médica de inmediato según las indicaciones.   Document Released: 03/26/2009 Document Revised: 03/11/2013  ExitCare® Patient Information ©2013 [employer].          Patient Information     Patient Information    Following emergency treatment: all patient requiring follow-up care must return either to a private physician or a clinic if your condition worsens before you are able to obtain further medical attention, please return to the emergency room.     Billing Information    At [facility], we work to make  the billing process streamlined for our patients.  Our Representatives are here to answer any questions you may have regarding your hospital bill.  If you have insurance coverage and have supplied your insurance information to us, we will submit a claim to your insurer on your behalf.  Should you have any questions regarding your bill, we can be reached online or by phone as follows:  Online: You are able pay your bills online or live chat with our representatives about any billing questions you may have. We are here to help Monday - Friday from 8:00am to 7:30pm and 9:00am - 12:00pm on Saturdays.  Please visit [email or web address]  for more information.   Phone:  [phone] or [phone]    Please note that your emergency physician, surgeon, pathologist, radiologist, anesthesiologist, and other specialists are not employed by [facility] and will therefore bill separately for their services.  Please contact them directly for any questions concerning their bills at the numbers below:     Emergency Physician Services:  [phone]  [facility]:  [phone]  [facility]:  [phone]  [facility]:  [phone]    1. Your final bill may vary from the amount quoted upon discharge if all procedures are not complete at that time, or if your doctor has additional procedures of which we are not aware. You will receive an additional bill if you return to the Emergency Department at [facility] hospital for suture removal regardless of the facility of which the sutures were placed.     2. Please arrange for settlement of this account at the emergency registration.    3. All self-pay accounts are due in full at the time of treatment.  If you are unable to meet this obligation then payment is expected within 4-5 days.     4. If you have had radiology studies (CT, X-ray, Ultrasound, MRI), you have received a preliminary result during your emergency  department visit. Please contact the radiology department [phone] to receive a copy of your final result. Please discuss the Final result with your primary physician or with the follow up physician provided.     Crisis Hotline:  [place] Crisis Hotline:  6-708-BCDYNEU or 1-378.820.3834  [place] Crisis Hotline:    1-354.849.6761 or 447-200-8702         ED Discharge Follow Up Questions    1. In order to provide you with very good care, we would like to follow up with a phone call in the next few days.  May we have your permission to contact you?     YES /  NO    2. What is the best phone number to call you? (       )_____-__________    3. What is the best time to call you?      Morning  /  Afternoon  /  Evening                   Patient Signature:  ____________________________________________________________    Date:  ____________________________________________________________      Your appointments     Jul 25, 2017  7:20 AM   Established Patient with Perla Whalen M.D.   [facility]    [address], [address]  [place] [id number] [phone]           You will be receiving a confirmation call a few days before your appointment from our automated call confirmation system.

## 2017-05-30 ENCOUNTER — HOSPITAL ENCOUNTER (EMERGENCY)
Facility: MEDICAL CENTER | Age: 62
End: 2017-05-30
Attending: EMERGENCY MEDICINE
Payer: MEDICARE

## 2017-05-30 VITALS
WEIGHT: 146.83 LBS | OXYGEN SATURATION: 92 % | BODY MASS INDEX: 26.02 KG/M2 | HEIGHT: 63 IN | RESPIRATION RATE: 16 BRPM | HEART RATE: 67 BPM | TEMPERATURE: 100 F | SYSTOLIC BLOOD PRESSURE: 126 MMHG | DIASTOLIC BLOOD PRESSURE: 80 MMHG

## 2017-05-30 DIAGNOSIS — K29.50 CHRONIC GASTRITIS, PRESENCE OF BLEEDING UNSPECIFIED, UNSPECIFIED GASTRITIS TYPE: ICD-10-CM

## 2017-05-30 DIAGNOSIS — F41.9 ANXIETY: ICD-10-CM

## 2017-05-30 DIAGNOSIS — G89.29 CHRONIC ABDOMINAL PAIN: ICD-10-CM

## 2017-05-30 DIAGNOSIS — R10.9 CHRONIC ABDOMINAL PAIN: ICD-10-CM

## 2017-05-30 PROCEDURE — 700102 HCHG RX REV CODE 250 W/ 637 OVERRIDE(OP): Performed by: EMERGENCY MEDICINE

## 2017-05-30 PROCEDURE — A9270 NON-COVERED ITEM OR SERVICE: HCPCS | Performed by: EMERGENCY MEDICINE

## 2017-05-30 PROCEDURE — 99284 EMERGENCY DEPT VISIT MOD MDM: CPT

## 2017-05-30 RX ORDER — ALPRAZOLAM 0.25 MG/1
0.25 TABLET ORAL 3 TIMES DAILY PRN
Qty: 15 TAB | Refills: 0 | Status: SHIPPED | OUTPATIENT
Start: 2017-05-30 | End: 2019-03-27

## 2017-05-30 RX ADMIN — LIDOCAINE HYDROCHLORIDE 30 ML: 20 SOLUTION OROPHARYNGEAL at 07:57

## 2017-05-30 NOTE — ED PROVIDER NOTES
"ED Provider Note    Scribed for Arian Caebllo D.O. by Suleman Mcdermott. 5/30/2017  7:38 AM    Primary care provider: Moses Handy M.D.  Means of arrival: walk in  History obtained from: patient  History limited by: none    CHIEF COMPLAINT  Chief Complaint   Patient presents with   • Abdominal Pain     over month and just worse last night    • N/V     last vomit was this morning        HPI  Sena Shepherd is a 62 y.o. female who presents to the Emergency Department with worsening epigastric abdominal pain starting last night. She describes her pain as burning that is exacerbated when she touches the area. Patient states that she has been experiencing abdominal pain for more than a month, but suddenly worsened last night and was unable to sleep. She has associated nausea, vomiting starting last night. Patient has an appointment with her GI, Dr. Edgar, and is scheduled for a CT of her abdomen. She is prescribed pain medication, but states that it \"makes her crazy\" and is requesting alternative medication. Patient reports that she was evaluated in the ED yesterday, but her symptoms are worsening. She denies having a fever.     REVIEW OF SYSTEMS  Pertinent positives include abdominal pain, nausea, vomiting. Pertinent negatives include no fever.  All other systems reviewed and negative.  C.    PAST MEDICAL HISTORY  Past Medical History   Diagnosis Date   • Arthralgia    • Depression    • GERD (gastroesophageal reflux disease)    • Vitamin d deficiency    • Menopause    • Hypothyroid    • Hyperlipidemia    • Hypertension        SURGICAL HISTORY  Past Surgical History   Procedure Laterality Date   • Cholecystectomy     • Cholecystectomy  2/2016        SOCIAL HISTORY  Social History   Substance Use Topics   • Smoking status: Never Smoker    • Smokeless tobacco: Never Used   • Alcohol Use: No      History   Drug Use No       FAMILY HISTORY  None noted    CURRENT MEDICATIONS  No current " "facility-administered medications for this encounter.    Current outpatient prescriptions:   •  alprazolam (XANAX) 0.25 MG Tab, Take 1 Tab by mouth 3 times a day as needed for Anxiety., Disp: 15 Tab, Rfl: 0  •  omeprazole (PRILOSEC) 20 MG delayed-release capsule, Take 20 mg by mouth every day., Disp: , Rfl:   •  lamotrigine (LAMICTAL) 25 MG Tab, Take 25 mg by mouth every day., Disp: , Rfl:   •  atenolol (TENORMIN) 25 MG Tab, Take 25 mg by mouth every day., Disp: , Rfl:   •  dicyclomine (BENTYL) 10 MG Cap, Take 10 mg by mouth 4 Times a Day,Before Meals and at Bedtime., Disp: , Rfl:   •  lurasidone (LATUDA) 40 MG Tab, Take 40 mg by mouth 2 times a day, with meals., Disp: , Rfl:   •  hydrocodone-acetaminophen (NORCO) 5-325 MG Tab per tablet, Take 1-2 Tabs by mouth every 6 hours as needed., Disp: 20 Tab, Rfl: 0  •  sucralfate (CARAFATE) 1 GM Tab, Take 1 Tab by mouth 4 Times a Day,Before Meals and at Bedtime., Disp: 120 Tab, Rfl: 3  •  fexofenadine (ALLEGRA) 30 MG tablet, Take 1 Tab by mouth 2 times a day., Disp: 10 Tab, Rfl: 0    ALLERGIES  Allergies   Allergen Reactions   • Nkda [No Known Drug Allergy]        PHYSICAL EXAM  VITAL SIGNS: /80 mmHg  Pulse 70  Temp(Src) 37.8 °C (100 °F)  Resp 16  Ht 1.6 m (5' 3\")  Wt 66.6 kg (146 lb 13.2 oz)  BMI 26.02 kg/m2  SpO2 97%    Nursing notes and vitals reviewed.  Constitutional: Well developed, Well nourished, Non-toxic appearance. Mild distress. Anxious.   Eyes: PERRLA, EOMI, Conjunctiva normal, No discharge.   Cardiovascular: Normal heart rate, Normal rhythm, No murmurs, No rubs, No gallops.   Thorax & Lungs: No respiratory distress, No rales, No rhonchi, No wheezing, No chest tenderness.   Abdomen: Bowel sounds normal, Soft, No guarding, No rebound, No masses, No pulsatile masses. Slight epigastric abdominal tenderness.   Skin: Warm, Dry, No erythema, No rash.   Musculoskeletal: Intact distal pulses, No edema, No cyanosis, No clubbing. Good range of motion in all " major joints. No tenderness to palpation or major deformities noted, no CVA tenderness, no midline back tenderness.   Neurologic: Alert & oriented x 3, Normal motor function, Normal sensory function, No focal deficits noted.  Psychiatric: Affect normal for clinical presentation.    DIAGNOSTIC STUDIES/PROCEDURES    COURSE & MEDICAL DECISION MAKING  Pertinent Labs & Imaging studies reviewed. (See chart for details)    Review of past medical records shows the patient's CT on 4/16 was negative.      7:38 AM - Patient seen and examined at bedside. Patient is requesting alternative pain medication and Ativan. I informed her that I cannot prescribe these medications to her at this time for chronic pain. Her current prescriptions are appropriate for her symptoms. I informed her that she needs to follow up with her primary and GI specialist in order to change her current prescriptions. Patient is insistent on changing her medication. I reviewed her medications and their interactions and there are no contraindications or drug interactions. Patient will be treated with GI cocktail 30 ml. The differential diagnoses include but are not limited to: gastric ulcer, anxiety    8:26 AM - Nurse informs that the patient is feeling improved. I will discharge home.     This is a 62-year-old female presents with chronic abdominal pain. She has had diagnoses of renal bowel syndrome and has had previous intervention with EGD, CT scan. She states that the medication she was taking yesterday, Norco made her feel where she liked different types of pain medication. The patient does have a significant anxiety component to her presentation today. I will add on Xanax and an referred her back to her GI physician for further evaluation. The patient has no significant downturn some examination, she is afebrile, I did review the laboratory evaluations from yesterday and her previous multiple visits for the same type of complaints. The patient has no  evidence of bowel obstruction, ischemic bowel, diverticulitis, colitis.    Although at this point I do not believe the patient has an acute intra-abdominal process that requires emergent surgical intervention there is a possibility that the patient is experiencing an early infection that is in evolution that may require further evaluation and possible surgical consultation. Therefore, strict return precautions have been given to return to the emergency department within 24 hours if the patient has any remaining abdominal pain and to return sooner for increasing pain, uncontrolled nausea or vomiting, fevers or change in symptoms. The patient will followup with their primary care physician within 3 days for re-evaluation.      The patient will return for new or worsening symptoms and is stable at the time of discharge.    DISPOSITION:  Patient will be discharged home in stable condition.    FOLLOW UP:  [facility], Emergency Dept  [address] [address] [phone]    If symptoms worsen    [facility]  [facility] [address]  [phone]    Schedule an appointment as soon as possible for a visit  As needed    Moses Handy M.D.  [address]  [address]  [place] [address]  [phone]    Schedule an appointment as soon as possible for a visit in 1 week        OUTPATIENT MEDICATIONS:  New Prescriptions    ALPRAZOLAM (XANAX) 0.25 MG TAB    Take 1 Tab by mouth 3 times a day as needed for Anxiety.           FINAL IMPRESSION  1. Chronic abdominal pain    2. Anxiety    3. Chronic gastritis, presence of bleeding unspecified, unspecified gastritis type          I, Suleman Mcdermott (Jaime), am scribing for, and in the presence of, Arian Cabello D.O    Electronically signed by: Suleman Mcdermott (Jaime), 5/30/2017    IArian D.O. personally performed the services described in this documentation, as scribed by Suleman Mcdermott in my presence,  and it is both accurate and complete.    The note accurately reflects work and decisions made by me.  Arian Cabello  5/30/2017  2:00 PM

## 2017-05-30 NOTE — ED NOTES
"States pain improved with GI cocktail.  Requesting something at home to \"stop the shaking with the pain.\"  Discussed taking as needed anxiety medicine with patient.  States maybe that would help, has not taken anything like this that she can remember   "

## 2017-05-30 NOTE — ED AVS SNAPSHOT
Zaiseoul Access Code: [email or web address]  Expires: 6/28/2017 10:47 AM    Your email address is not on file at [facility].  Email Addresses are required for you to sign up for Zaiseoul, please contact [phone] to verify your personal information and to provide your email address prior to attempting to register for Zaiseoul.    Sena Vazquez LeonIsabel MAZA [address]  [place] [address]    Zaiseoul  A secure, online tool to manage your health information     [facility]’s Zaiseoul® is a secure, online tool that connects you to your personalized health information from the privacy of your home -- day or night - making it very easy for you to manage your healthcare. Once the activation process is completed, you can even access your medical information using the Zaiseoul karan, which is available for free in the Apple Karan store or Google Play store.     To learn more about Zaiseoul, visit [email or web address]    There are two levels of access available (as shown below):   My Chart Features  [facility] Primary Care Doctor [facility]  Specialists [facility]  Urgent  Care Non-[facility] Primary Care Doctor   Email your healthcare team securely and privately 24/7 X X X    Manage appointments: schedule your next appointment; view details of past/upcoming appointments X      Request prescription refills. X      View recent personal medical records, including lab and immunizations X X X X   View health record, including health history, allergies, medications X X X X   Read reports about your outpatient visits, procedures, consult and ER notes X X X X   See your discharge summary, which is a recap of your hospital and/or ER visit that includes your diagnosis, lab results, and care plan X X  X     How to register for Zaiseoul:  Once your e-mail address has been verified, follow the following steps to sign up for Zaiseoul.     1. Go to  [email or web address]  2. Click on the Sign Up Now box, which takes you to the New Member Sign Up page.  You will need to provide the following information:  a. Enter your [facility] [facility] Access Code exactly as it appears at the top of this page. (You will not need to use this code after you’ve completed the sign-up process. If you do not sign up before the expiration date, you must request a new code.)   b. Enter your date of birth.   c. Enter your home email address.   d. Click Submit, and follow the next screen’s instructions.  3. Create a [employer] ID. This will be your [facility] login ID and cannot be changed, so think of one that is secure and easy to remember.  4. Create a [facility] password. You can change your password at any time.  5. Enter your Password Reset Question and Answer. This can be used at a later time if you forget your password.   6. Enter your e-mail address. This allows you to receive e-mail notifications when new information is available in [employer].  7. Click Sign Up. You can now view your health information.    For assistance activating your [employer] account, call [phone]

## 2017-05-30 NOTE — ED AVS SNAPSHOT
5/30/2017    Sena Lowecherie Shepherd  [address]  [facility] [address]    Dear Sena:    Critical access hospital wants to ensure your discharge home is safe and you or your loved ones have had all of your questions answered regarding your care after you leave the hospital.    Below is a list of resources and contact information should you have any questions regarding your hospital stay, follow-up instructions, or active medical symptoms.    Questions or Concerns Regarding… Contact   Medical Questions Related to Your Discharge  (7 days a week, 8am-5pm) Contact a Nurse Care Coordinator   [phone]   Medical Questions Not Related to Your Discharge  (24 hours a day / 7 days a week)  Contact the Nurse Health Line   [phone]    Medications or Discharge Instructions Refer to your discharge packet   or contact your [facility] Primary Care Provider   [phone]   Follow-up Appointment(s) Schedule your appointment via [employer]   or contact Scheduling [phone]   Billing Review your statement via [employer]  or contact Billing [phone]   Medical Records Review your records via [employer]   or contact Medical Records [phone]     You may receive a telephone call within two days of discharge. This call is to make certain you understand your discharge instructions and have the opportunity to have any questions answered. You can also easily access your medical information, test results and upcoming appointments via the [employer] free online health management tool. You can learn more and sign up at [employer]. For assistance setting up your [employer] account, please call [phone].    Once again, we want to ensure your discharge home is safe and that you have a clear understanding of any next steps in your care. If you have any questions or concerns, please do not hesitate to contact us, we are here for you. Thank you for choosing [facility] for your healthcare needs.    Sincerely,    Your [facility] Healthcare Team

## 2017-05-30 NOTE — ED AVS SNAPSHOT
Home Care Instructions                                                                                                                Sena Shepherd   MRN: [id number]    Department:  [facility], Emergency Dept   Date of Visit:  5/30/2017            [facility], Emergency Dept    [address] [facility] [address] [facility] [address]    Phone:  [phone]      You were seen by     Arian Cabello D.O.      Your Diagnosis Was     Chronic abdominal pain     R10.9, G89.29       These are the medications you received during your hospitalization from 05/30/2017 0704 to 05/30/2017 0838     Date/Time Order Dose Route Action    05/30/2017 0757 hyoscyamine-maalox plus-lidocaine viscous (GI COCKTAIL) oral susp 30 mL 30 mL Oral Given      Follow-up Information     1. Follow up with [facility], Emergency Dept.    Specialty:  Emergency Medicine    Why:  If symptoms worsen    Contact information    [address] [place] [id number] [phone]        2. Schedule an appointment as soon as possible for a visit with [facility].    Why:  As needed    Contact information    [facility] [address] [phone]          3. Follow up with Moses Handy M.D.. Schedule an appointment as soon as possible for a visit in 1 week.    Specialty:  Internal Medicine    Contact information    [address] [place] [address] [phone]        Medication Information     Review all of your home medications and newly ordered medications with your primary doctor and/or pharmacist as soon as possible. Follow medication instructions as directed by your doctor and/or pharmacist.     Please keep your complete medication list with you and share with your physician. Update the information when medications are discontinued, doses are changed, or new medications (including over-the-counter products) are added; and carry medication information at all  times in the event of emergency situations.               Medication List      START taking these medications        Instructions    Morning Afternoon Evening Bedtime    alprazolam 0.25 MG Tabs   Commonly known as:  XANAX        Take 1 Tab by mouth 3 times a day as needed for Anxiety.   Dose:  0.25 mg                          ASK your doctor about these medications        Instructions    Morning Afternoon Evening Bedtime    atenolol 25 MG Tabs   Commonly known as:  TENORMIN        Take 25 mg by mouth every day.   Dose:  25 mg                        dicyclomine 10 MG Caps   Commonly known as:  BENTYL        Take 10 mg by mouth 4 Times a Day,Before Meals and at Bedtime.   Dose:  10 mg                        fexofenadine 30 MG tablet   Commonly known as:  ALLEGRA        Take 1 Tab by mouth 2 times a day.   Dose:  30 mg                        hydrocodone-acetaminophen 5-325 MG Tabs per tablet   Commonly known as:  NORCO        Take 1-2 Tabs by mouth every 6 hours as needed.   Dose:  1-2 Tab                        lamotrigine 25 MG Tabs   Commonly known as:  LAMICTAL        Take 25 mg by mouth every day.   Dose:  25 mg                        LATUDA 40 MG Tabs   Generic drug:  lurasidone        Take 40 mg by mouth 2 times a day, with meals.   Dose:  40 mg                        omeprazole 20 MG delayed-release capsule   Commonly known as:  PRILOSEC        Take 20 mg by mouth every day.   Dose:  20 mg                        sucralfate 1 GM Tabs   Commonly known as:  CARAFATE        Take 1 Tab by mouth 4 Times a Day,Before Meals and at Bedtime.   Dose:  1 g                             Where to Get Your Medications      You can get these medications from any pharmacy     Bring a paper prescription for each of these medications    - alprazolam 0.25 MG Tabs              Discharge Instructions             Gastritis - Adultos   (Gastritis, Adult)   La gastrittis es la irritación (inflamación) de la membrana interna del estómago.  Puede ser demetrius enfermedad de inicio súbito (aguda) o de madhavi plazo (crónica). Si la gastritis no se trata, puede causar sangrado y úlceras.  CAUSAS   La gastritis se produce cuando la membrana que tapiza interiormente al estómago se debilita o se daña. Los jugos digestivos del estómago inflaman el revestimiento del estómago debilitado. El revestimiento del estómago puede debilitarse o dañarse por demetrius infección viral o bacteriana. La infección bacteriana más común es la infección por Helicobacter pylori. También puede ser el resultado del consumo excesivo de alcohol, por el uso de ciertos medicamentos o porque hay demasiado ácido en el estómago.   SÍNTOMAS   En algunos casos no hay síntomas. Si se presentan síntomas, éstos pueden ser:   · Dolor o sensación de ardor en la parte superior del abdomen.  · Náuseas.  · Vómitos.  · Sensación molesta de distensión después de comer.  DIAGNÓSTICO   El médico puede diagnosticar gastritis según los síntomas y el examen físico. Para determinar la causa de la gastritis, el médico podrá:   · Pedir análisis de angela o de materia fecal para diagnosticar la presencia de la bacteria H pylori.  · Gastroscopía. Un tubo cline y flexible (endoscopio) se pasa por el esófago hasta llegar al estómago. El endoscopio tiene demetrius travis y demetrius cámara en el extremo. El médico utilizará el endoscopio para observar el interior del estómago.  · Tomará demetrius muestra de tejido (biopsia) del estómago para examinarlo en el microscopio.  TRATAMIENTO   Según la causa de la gastritis podrán recetarle: Antibióticos, si la causa es demetrius infección bacteriana, yas demetrius infección por H. pylori. Antiácidos o bloqueadores H2, si hay demasiado ácido en el estómago. El médico le aconsejará que deje de sebas aspirina, ibuprofeno u otros antiinflamatorios no esteroides (KAMILLE).   INSTRUCCIONES PARA EL CUIDADO EN EL HOGAR   · [place] sólo medicamentos de venta juju o recetados, según las indicaciones del médico.  · Si le montes  recetado antibióticos, tómelos según las indicaciones. Tómelos todos, aunque se sienta mejor.  · Debe ingerir gran cantidad de líquido para mantener la orina de abraham gail o color amarillo pálido.  · Evite las comidas y bebidas que empeoran los problemas, yas:  · Bebidas con cafeína o alcohólicas.  · Chocolate.  · Sabores a menta.  · [place] y cebolla.  · Comidas muy condimentadas.  · Cítricos yas naranjas, kit o angélica.  · Alimentos que contengan tomate, yas salsas, chile y pizza.  · Alimentos fritos y grasos.  · Rosibel comidas pequeñas yu el día en lugar de 3 comidas abundantes.  SOLICITE ATENCIÓN MÉDICA DE INMEDIATO SI:   · La materia fecal es dandre o de color amaro oscuro.  · Vomita angela de color amaro brillante o material similar a granos de café.  · No puede retener los líquidos.  · El dolor abdominal empeora.  · Tiene fiebre.  · No mejora luego de 1 semana.  · Tiene preguntas o preocupaciones.  ASEGÚRESE DE QUE:   · Comprende estas instrucciones.  · Controlará bobo enfermedad.  · Solicitará ayuda de inmediato si no mejora o si empeora.     Esta información no tiene yas fin reemplazar el consejo del médico. Asegúrese de hacerle al médico cualquier pregunta que tenga.     Document Released: 09/27/2006 Document Revised: 09/11/2013  [employer] Interactive Patient Education ©2016 [employer]    Dolor abdominal en las mujeres  (Abdominal Pain, Women)  El dolor abdominal (en el estómago, la pelvis o el vientre) puede tener muchas causas. Es importante que le informe a bobo médico:  · La ubicación del dolor.  · ¿Viene y se va, o persiste todo el tiempo?  · ¿Hay situaciones que inician el dolor (comer ciertos alimentos, la actividad física)?  · ¿Tiene otros síntomas asociados al dolor (fiebre, náuseas, vómitos, diarrea)?  Todo es de gran ayuda cuando se trata de hallar la causa del dolor.  CAUSAS  · Estómago: Infecciones por virus o bacterias, o úlcera.  · Intestino: Apendicitis (apéndice inflamado), ileitis  regional (enfermedad de Crohn), colitis ulcerosa (colon inflamado), síndrome del colon irritable, diverticulitis (inflamación de los divertículos del colon) o cáncer de estómago oo intestino.  · Enfermedades de la vesícula biliar o cálculos.  · Enfermedades renales, cálculos o infecciones en el riñón.  · Infección o cáncer del páncreas.  · Fibromialgia (trastorno doloroso)  · Enfermedades de los órganos femeninos:  · Uterus: Útero: fibroma (tumor no canceroso) o infección  · Trompas de Falopio: infección o embarazo ectópico  · En los ovarios, quistes o tumores.  · Adherencias pélvicas (tejido cicatrizal).  · Endometriosis (el tejido que cubre el útero se desarrolla en la pelvis y los órganos pélvicos).  · Síndrome de congestión pélvica (los órganos femeninos se llenan de angela antes del periodo menstrual(  · Dolor yu el periodo menstrual.  · Dolor yu la ovulación (al producir óvulos).  · Dolor al usar el DIU (dispositivo intrauterino para el control de la natalidad)  · Cáncer en los órganos femeninos.  · Dolor funcional (no está originado en demetrius enfermedad, puede mejorar sin tratamiento).  · Dolor de origen psicológico  · Depresión.  DIAGNÓSTICO  [place]man médico decidirá la gravedad del dolor a través del examen físico  · Análisis de angela  · Radiografías  · Ecografías  · TC (tomografía computada, tipo especial de radiografías).  · IMR (resonancia magnética)  · Cultivos, en el cholo demetrius infección  · Colon por enema de bario (se inserta demetrius sustancia de contraste en el intestino grueso para mejorar la observación con conner X.)  · Colonoscopía (observación del intestino con un tubo luminoso).  · Laparoscopía (examen del interior del abdomen con un tubo que tiene demetrius travis).  · Cirugía exploratoria abdominal mayor (se observa el abdomen realizando demetrius gran incisión).  TRATAMIENTO  El tratamiento dependerá de la causa del problema.   · Muchos de estos casos pueden controlarse y tratarse en casa.  · Medicamentos de  venta juju indicados por el médico.  · Medicamentos con receta.  · Antibióticos, en cholo de infección  · Píldoras anticonceptivas, en el cholo de períodos dolorosos o dolor al ovular.  · Tratamiento hormonal, para la endometriosis  · Inyecciones para bloqueo nervioso selectivo.  · Fisioterapia.  · Antidepresivos.  · Consejos por parte de un psícólogo o psiquiatra.  · Cirugía mayor o virgen.  INSTRUCCIONES PARA EL CUIDADO DOMICILIARIO  · No tome ni administre laxantes a menos que se lo haya indicado bobo médico.  · [place] analgésicos de venta juju sólo si se lo ha indicado el profesional que lo asiste. No tome aspirina, ya que puede causar molestias en el estómago o hemorragias.  · Consuma demetrius dieta líquida (caldo o agua) según lo indicado por el médico. Progrese lentamente a demetrius dieta blanda, según la tolerancia, si el dolor se relaciona con el estómago o el intestino.  · Tenga un termómetro y tómese la temperatura varias veces al día.  · Rosibel reposo en la cama y duerma, si esto sergio el dolor.  · Evite las relaciones sexuales, si le producen dolor.  · Evite las situaciones estresantes.  · Cumpla con las visitas y los análisis de control, según las indicaciones de bobo médico.  · Si el dolor no se sergio con los medicamentos o la cirugía, puede tratar con:  · Acupuntura.  · Ejercicios de relajación (yoga, meditación).  · Terapia grupal.  · Psicoterapia.  SOLICITE ATENCIÓN MÉDICA SI:  · Nota que ciertos alimentos le producen dolor de estómago.  · El tratamiento indicado para realizar en el hogar no le sergio el dolor.  · Necesita analgésicos más fercho.  · Quiere que le retiren el DIU.  · Si se siente confundido o desfalleciente.  · Presenta náuseas o vómitos.  · Aparece demetrius erupción cutánea.  · Sufre efectos adversos o demetrius reacción alérgica debido a los medicamentos que damon.  SOLICITE ATENCIÓN MÉDICA DE INMEDIATO SI:  · El dolor persiste o se agrava.  · Tiene fiebre.  · Siente el dolor sólo en algunos sectores del  abdomen. Si se localiza en la earl derecha, posiblemente podría tratarse de apendicitis. En un adulto, si se localiza en la región inferior izquierda del abdomen, podría tratarse de colitis o diverticulitis.  · Hay angela en las heces (deposiciones de color amaro brillante o edwina alquitranado), con o sin vómitos.  · Usted presenta angela en la orina.  · Siente escalofríos con o sin fiebre.  · Se desmaya.  ASEGÚRESE QUE:   · Comprende estas instrucciones.  · Controlará bobo enfermedad.  · Solicitará ayuda de inmediato si no mejora o si empeora.  Document Released: 04/05/2010 Document Revised: 03/11/2013  [employer]® Patient Information ©2014 [employer].  Ansiedad y crisis de angustia  (Anxiety and Panic Attacks)  El profesional que lo asiste le kc informado que usted padece ansiedad o crisis de angustia. Kristin trastorno puede presentarse de varias formas. La mayor parte de las veces las crisis aparecen de modo repentino y sin aviso. Se producen en cualquier momento del día, incluso yu el sueño, y en cualquier etapa de la annie. Pueden ser muy intensas e inexplicadas. Aunque un ataque de pánico puede ser muy atemorizante, no produce daños físicos. Algunas veces se desconoce la causa de la ansiedad. La ansiedad es un mecanismo protector del organismo en bobo respuesta de eneida o escape. Muchas de estas situaciones de percepción de peligro son en realidad situaciones no físicas (yas la ansiedad de perder el trabajo).  CAUSAS  Las causas de la ansiedad o de un ataque de pánico pueden ser muchas. Los ataques de pánico pueden ocurrir en personas sanas en demetrius serie de circunstancias. Es posible que haya demetrius causa genética de los ataques de pánico. Algunos medicamentos pueden provocar ansiedad yas efecto secundario.  SÍNTOMAS  Algunas de las sensaciones más comunes son:  · Terror intenso.  · Vahídos, desfallecimiento.  · Golpes de frío y calor.  · Temor a enloquecer.  · Sentimiento de  irrealidad.  · Sudoración.  · Temblores.  · Dolor en el pecho y latidos irregulares (palpitaciones).  · Sensaciones de ahogo o sofocos.  · Sentimiento de peligro inminente y de que la muerte está próxima.  · Hormigueo en las extremidades que puede venir de la respiración agitada.  · Alteración de la realidad (desrealización).  · Sentirse separado de adam mismo (despersonalización).  Estos son los síntomas (problemas) más comunes y pueden combinarse para presentar la crisis de angustia.   DIAGNÓSTICO  La evaluación que realice el profesional dependerá del tipo de síntomas que esté experimentando. El diagnóstico de la ansiedad o de ataque de pánico se realiza cuando no se encuentra ninguna enfermedad física que pueda determinarse yas la causa de los síntomas.  TRATAMIENTO  El tratamiento para prevenir la ansiedad y los ataques de pánico incluye:  · Evitar las circunstancias que causen ansiedad.  · Reaseguro y relajación.  · Ejercicio regular.  · Terapias de relajación, yas yoga.  · Psicoterapia con un psiquiatra o terapeuta.  · Evitar la cafeína, el alcohol y las drogas ilegales.  · Medicamentos de prescripción.  SOLICITE ATENCIÓN MÉDICA DE INMEDIATO SI:  · Usted experimenta síntomas de ataque de pánico que son distintos a rossy síntomas usuales.  · Tiene cualquier síntoma que empeora o lo preocupa.  Document Released: 12/18/2006 Document Revised: 03/11/2013  [employer]® Patient Information ©2014 [employer].            Patient Information     Patient Information    Following emergency treatment: all patient requiring follow-up care must return either to a private physician or a clinic if your condition worsens before you are able to obtain further medical attention, please return to the emergency room.     Billing Information    At [facility], we work to make the billing process streamlined for our patients.  Our Representatives are here to answer any questions you may have regarding your hospital bill.  If you  have insurance coverage and have supplied your insurance information to us, we will submit a claim to your insurer on your behalf.  Should you have any questions regarding your bill, we can be reached online or by phone as follows:  Online: You are able pay your bills online or live chat with our representatives about any billing questions you may have. We are here to help Monday - Friday from 8:00am to 7:30pm and 9:00am - 12:00pm on Saturdays.  Please visit [email or web address]  for more information.   Phone:  [phone] or [phone]    Please note that your emergency physician, surgeon, pathologist, radiologist, anesthesiologist, and other specialists are not employed by [facility] and will therefore bill separately for their services.  Please contact them directly for any questions concerning their bills at the numbers below:     Emergency Physician Services:  [phone]  [employer] [facility]:  [phone]  [facility]:  [phone]  [facility]:  [phone]    1. Your final bill may vary from the amount quoted upon discharge if all procedures are not complete at that time, or if your doctor has additional procedures of which we are not aware. You will receive an additional bill if you return to the Emergency Department at [facility] for suture removal regardless of the facility of which the sutures were placed.     2. Please arrange for settlement of this account at the emergency registration.    3. All self-pay accounts are due in full at the time of treatment.  If you are unable to meet this obligation then payment is expected within 4-5 days.     4. If you have had radiology studies (CT, X-ray, Ultrasound, MRI), you have received a preliminary result during your emergency department visit. Please contact the radiology department [phone] to receive a copy of your final result. Please discuss the Final result with your  primary physician or with the follow up physician provided.     Crisis Hotline:  [place] Crisis Hotline:  3-095-UWTKYCL or 1-853.361.4434  [place] Crisis Hotline:    1-407.687.4604 or 668-089-6342         ED Discharge Follow Up Questions    1. In order to provide you with very good care, we would like to follow up with a phone call in the next few days.  May we have your permission to contact you?     YES /  NO    2. What is the best phone number to call you? (       )_____-__________    3. What is the best time to call you?      Morning  /  Afternoon  /  Evening                   Patient Signature:  ____________________________________________________________    Date:  ____________________________________________________________      Your appointments     Jul 25, 2017  7:20 AM   Established Patient with Perla Whalen M.D.   [facility]    [address], [address]  [facility] [id number] [phone]           You will be receiving a confirmation call a few days before your appointment from our automated call confirmation system.

## 2017-05-30 NOTE — DISCHARGE INSTRUCTIONS
Gastritis - Adultos   (Gastritis, Adult)   La gastrittis es la irritación (inflamación) de la membrana interna del estómago. Puede ser demetrius enfermedad de inicio súbito (aguda) o de madhavi plazo (crónica). Si la gastritis no se trata, puede causar sangrado y úlceras.  CAUSAS   La gastritis se produce cuando la membrana que tapiza interiormente al estómago se debilita o se daña. Los jugos digestivos del estómago inflaman el revestimiento del estómago debilitado. El revestimiento del estómago puede debilitarse o dañarse por demetrius infección viral o bacteriana. La infección bacteriana más común es la infección por Helicobacter pylori. También puede ser el resultado del consumo excesivo de alcohol, por el uso de ciertos medicamentos o porque hay demasiado ácido en el estómago.   SÍNTOMAS   En algunos casos no hay síntomas. Si se presentan síntomas, éstos pueden ser:   · Dolor o sensación de ardor en la parte superior del abdomen.  · Náuseas.  · Vómitos.  · Sensación molesta de distensión después de comer.  DIAGNÓSTICO   El médico puede diagnosticar gastritis según los síntomas y el examen físico. Para determinar la causa de la gastritis, el médico podrá:   · Pedir análisis de angela o de materia fecal para diagnosticar la presencia de la bacteria H pylori.  · Gastroscopía. Un tubo cline y flexible (endoscopio) se pasa por el esófago hasta llegar al estómago. El endoscopio tiene demetrius travis y demetrius cámara en el extremo. El médico utilizará el endoscopio para observar el interior del estómago.  · Tomará demetrius muestra de tejido (biopsia) del estómago para examinarlo en el microscopio.  TRATAMIENTO   Según la causa de la gastritis podrán recetarle: Antibióticos, si la causa es demetrius infección bacteriana, yas demetrius infección por H. pylori. Antiácidos o bloqueadores H2, si hay demasiado ácido en el estómago. El médico le aconsejará que deje de sebas aspirina, ibuprofeno u otros antiinflamatorios no esteroides (KAMILLE).   INSTRUCCIONES  PARA EL CUIDADO EN EL HOGAR   · [place] sólo medicamentos de venta juju o recetados, según las indicaciones del médico.  · Si le montes recetado antibióticos, tómelos según las indicaciones. Tómelos todos, aunque se sienta mejor.  · Debe ingerir gran cantidad de líquido para mantener la orina de abraham gail o color amarillo pálido.  · Evite las comidas y bebidas que empeoran los problemas, yas:  · Bebidas con cafeína o alcohólicas.  · Chocolate.  · Sabores a menta.  · [place] y cebolla.  · Comidas muy condimentadas.  · Cítricos yas naranjas, kit o angélica.  · Alimentos que contengan tomate, yas salsas, chile y pizza.  · Alimentos fritos y grasos.  · Rosibel comidas pequeñas yu el día en lugar de 3 comidas abundantes.  SOLICITE ATENCIÓN MÉDICA DE INMEDIATO SI:   · La materia fecal es dandre o de color amaro oscuro.  · Vomita angela de color amaro brillante o material similar a granos de café.  · No puede retener los líquidos.  · El dolor abdominal empeora.  · Tiene fiebre.  · No mejora luego de 1 semana.  · Tiene preguntas o preocupaciones.  ASEGÚRESE DE QUE:   · Comprende estas instrucciones.  · Controlará bobo enfermedad.  · Solicitará ayuda de inmediato si no mejora o si empeora.     Esta información no tiene yas fin reemplazar el consejo del médico. Asegúrese de hacerle al médico cualquier pregunta que tenga.     Document Released: 09/27/2006 Document Revised: 09/11/2013  [employer] Interactive Patient Education ©2016 Elsevier Inc.    Dolor abdominal en las mujeres  (Abdominal Pain, Women)  El dolor abdominal (en el estómago, la pelvis o el vientre) puede tener muchas causas. Es importante que le informe a bobo médico:  · La ubicación del dolor.  · ¿Viene y se va, o persiste todo el tiempo?  · ¿Hay situaciones que inician el dolor (comer ciertos alimentos, la actividad física)?  · ¿Tiene otros síntomas asociados al dolor (fiebre, náuseas, vómitos, diarrea)?  Todo es de gran ayuda cuando se trata de hallar la causa del  dolor.  CAUSAS  · Estómago: Infecciones por virus o bacterias, o úlcera.  · Intestino: Apendicitis (apéndice inflamado), ileitis regional (enfermedad de Crohn), colitis ulcerosa (colon inflamado), síndrome del colon irritable, diverticulitis (inflamación de los divertículos del colon) o cáncer de estómago oo intestino.  · Enfermedades de la vesícula biliar o cálculos.  · Enfermedades renales, cálculos o infecciones en el riñón.  · Infección o cáncer del páncreas.  · Fibromialgia (trastorno doloroso)  · Enfermedades de los órganos femeninos:  · Uterus: Útero: fibroma (tumor no canceroso) o infección  · Trompas de Falopio: infección o embarazo ectópico  · En los ovarios, quistes o tumores.  · Adherencias pélvicas (tejido cicatrizal).  · Endometriosis (el tejido que cubre el útero se desarrolla en la pelvis y los órganos pélvicos).  · Síndrome de congestión pélvica (los órganos femeninos se llenan de angela antes del periodo menstrual(  · Dolor yu el periodo menstrual.  · Dolor yu la ovulación (al producir óvulos).  · Dolor al usar el DIU (dispositivo intrauterino para el control de la natalidad)  · Cáncer en los órganos femeninos.  · Dolor funcional (no está originado en demetrius enfermedad, puede mejorar sin tratamiento).  · Dolor de origen psicológico  · Depresión.  DIAGNÓSTICO  [place] médico decidirá la gravedad del dolor a través del examen físico  · Análisis de angela  · Radiografías  · Ecografías  · TC (tomografía computada, tipo especial de radiografías).  · IMR (resonancia magnética)  · Cultivos, en el cholo demetrius infección  · Colon por enema de bario (se inserta demetrius sustancia de contraste en el intestino grueso para mejorar la observación con conner X.)  · Colonoscopía (observación del intestino con un tubo luminoso).  · Laparoscopía (examen del interior del abdomen con un tubo que tiene demetrius travis).  · Cirugía exploratoria abdominal mayor (se observa el abdomen realizando demetrius gran incisión).  TRATAMIENTO  El  tratamiento dependerá de la causa del problema.   · Muchos de estos casos pueden controlarse y tratarse en casa.  · Medicamentos de venta juju indicados por el médico.  · Medicamentos con receta.  · Antibióticos, en cholo de infección  · Píldoras anticonceptivas, en el cholo de períodos dolorosos o dolor al ovular.  · Tratamiento hormonal, para la endometriosis  · Inyecciones para bloqueo nervioso selectivo.  · Fisioterapia.  · Antidepresivos.  · Consejos por parte de un psícólogo o psiquiatra.  · Cirugía mayor o virgen.  INSTRUCCIONES PARA EL CUIDADO DOMICILIARIO  · No tome ni administre laxantes a menos que se lo haya indicado bobo médico.  · [place] analgésicos de venta juju sólo si se lo ha indicado el profesional que lo asiste. No tome aspirina, ya que puede causar molestias en el estómago o hemorragias.  · Consuma demetrius dieta líquida (caldo o agua) según lo indicado por el médico. Progrese lentamente a demetrius dieta blanda, según la tolerancia, si el dolor se relaciona con el estómago o el intestino.  · Tenga un termómetro y tómese la temperatura varias veces al día.  · Rosibel reposo en la cama y duerma, si esto sergio el dolor.  · Evite las relaciones sexuales, si le producen dolor.  · Evite las situaciones estresantes.  · Cumpla con las visitas y los análisis de control, según las indicaciones de bobo médico.  · Si el dolor no se sergio con los medicamentos o la cirugía, puede tratar con:  · Acupuntura.  · Ejercicios de relajación (yoga, meditación).  · Terapia grupal.  · Psicoterapia.  SOLICITE ATENCIÓN MÉDICA SI:  · Nota que ciertos alimentos le producen dolor de estómago.  · El tratamiento indicado para realizar en el hogar no le sergio el dolor.  · Necesita analgésicos más fercho.  · Quiere que le retiren el DIU.  · Si se siente confundido o desfalleciente.  · Presenta náuseas o vómitos.  · Aparece demetrius erupción cutánea.  · Sufre efectos adversos o demetrius reacción alérgica debido a los medicamentos que damon.  SOLICITE  ATENCIÓN MÉDICA DE INMEDIATO SI:  · El dolor persiste o se agrava.  · Tiene fiebre.  · Siente el dolor sólo en algunos sectores del abdomen. Si se localiza en la earl derecha, posiblemente podría tratarse de apendicitis. En un adulto, si se localiza en la región inferior izquierda del abdomen, podría tratarse de colitis o diverticulitis.  · Hay angela en las heces (deposiciones de color amaro brillante o edwina alquitranado), con o sin vómitos.  · Usted presenta angela en la orina.  · Siente escalofríos con o sin fiebre.  · Se desmaya.  ASEGÚRESE QUE:   · Comprende estas instrucciones.  · Controlará bobo enfermedad.  · Solicitará ayuda de inmediato si no mejora o si empeora.  Document Released: 04/05/2010 Document Revised: 03/11/2013  [employer]® Patient Information ©2014 [employer].  Ansiedad y crisis de angustia  (Anxiety and Panic Attacks)  El profesional que lo asiste le kc informado que usted padece ansiedad o crisis de angustia. Kristin trastorno puede presentarse de varias formas. La mayor parte de las veces las crisis aparecen de modo repentino y sin aviso. Se producen en cualquier momento del día, incluso yu el sueño, y en cualquier etapa de la annie. Pueden ser muy intensas e inexplicadas. Aunque un ataque de pánico puede ser muy atemorizante, no produce daños físicos. Algunas veces se desconoce la causa de la ansiedad. La ansiedad es un mecanismo protector del organismo en bobo respuesta de eneida o escape. Muchas de estas situaciones de percepción de peligro son en realidad situaciones no físicas (yas la ansiedad de perder el trabajo).  CAUSAS  Las causas de la ansiedad o de un ataque de pánico pueden ser muchas. Los ataques de pánico pueden ocurrir en personas sanas en demetrius serie de circunstancias. Es posible que haya demetrius causa genética de los ataques de pánico. Algunos medicamentos pueden provocar ansiedad yas efecto secundario.  SÍNTOMAS  Algunas de las sensaciones más comunes son:  · Terror  intenso.  · Vahídos, desfallecimiento.  · Golpes de frío y calor.  · Temor a enloquecer.  · Sentimiento de irrealidad.  · Sudoración.  · Temblores.  · Dolor en el pecho y latidos irregulares (palpitaciones).  · Sensaciones de ahogo o sofocos.  · Sentimiento de peligro inminente y de que la muerte está próxima.  · Hormigueo en las extremidades que puede venir de la respiración agitada.  · Alteración de la realidad (desrealización).  · Sentirse separado de adam mismo (despersonalización).  Estos son los síntomas (problemas) más comunes y pueden combinarse para presentar la crisis de angustia.   DIAGNÓSTICO  La evaluación que realice el profesional dependerá del tipo de síntomas que esté experimentando. El diagnóstico de la ansiedad o de ataque de pánico se realiza cuando no se encuentra ninguna enfermedad física que pueda determinarse yas la causa de los síntomas.  TRATAMIENTO  El tratamiento para prevenir la ansiedad y los ataques de pánico incluye:  · Evitar las circunstancias que causen ansiedad.  · Reaseguro y relajación.  · Ejercicio regular.  · Terapias de relajación, yas yoga.  · Psicoterapia con un psiquiatra o terapeuta.  · Evitar la cafeína, el alcohol y las drogas ilegales.  · Medicamentos de prescripción.  SOLICITE ATENCIÓN MÉDICA DE INMEDIATO SI:  · Usted experimenta síntomas de ataque de pánico que son distintos a rossy síntomas usuales.  · Tiene cualquier síntoma que empeora o lo preocupa.  Document Released: 12/18/2006 Document Revised: 03/11/2013  [employer]® Patient Information ©2014 [employer].

## 2017-05-30 NOTE — ED NOTES
"Pt to rm [facility] with c/o abdominal pain for the last month.  Had vomiting episode this am.  Had normal blood tests here yesterday and normal CT last month.  Patient is on multiple pain medicines for her stomach.  She states the norco they gave her yesterday \"makes me crazy, you need to change this medication.\"  Pt appears very anxious about condition.  Has appointment to see GI consultants but states she cannot wait until her appt.  Patient is poor historian.  Has to have things explained to her multiple times.     "

## 2017-06-11 ENCOUNTER — APPOINTMENT (OUTPATIENT)
Dept: RADIOLOGY | Facility: MEDICAL CENTER | Age: 62
End: 2017-06-11
Attending: EMERGENCY MEDICINE
Payer: MEDICARE

## 2017-06-11 ENCOUNTER — HOSPITAL ENCOUNTER (EMERGENCY)
Facility: MEDICAL CENTER | Age: 62
End: 2017-06-11
Attending: EMERGENCY MEDICINE
Payer: MEDICARE

## 2017-06-11 VITALS
SYSTOLIC BLOOD PRESSURE: 120 MMHG | RESPIRATION RATE: 18 BRPM | HEART RATE: 60 BPM | TEMPERATURE: 98.8 F | HEIGHT: 63 IN | WEIGHT: 144.84 LBS | DIASTOLIC BLOOD PRESSURE: 78 MMHG | OXYGEN SATURATION: 95 % | BODY MASS INDEX: 25.66 KG/M2

## 2017-06-11 DIAGNOSIS — F41.9 ANXIETY: ICD-10-CM

## 2017-06-11 LAB
ALBUMIN SERPL BCP-MCNC: 4.5 G/DL (ref 3.2–4.9)
ALBUMIN/GLOB SERPL: 1.7 G/DL
ALP SERPL-CCNC: 43 U/L (ref 30–99)
ALT SERPL-CCNC: 11 U/L (ref 2–50)
AMPHET UR QL SCN: NEGATIVE
ANION GAP SERPL CALC-SCNC: 10 MMOL/L (ref 0–11.9)
APPEARANCE UR: CLEAR
AST SERPL-CCNC: 13 U/L (ref 12–45)
BARBITURATES UR QL SCN: NEGATIVE
BASOPHILS # BLD AUTO: 0.3 % (ref 0–1.8)
BASOPHILS # BLD: 0.02 K/UL (ref 0–0.12)
BENZODIAZ UR QL SCN: NEGATIVE
BILIRUB SERPL-MCNC: 1 MG/DL (ref 0.1–1.5)
BILIRUB UR QL STRIP.AUTO: NEGATIVE
BUN SERPL-MCNC: 6 MG/DL (ref 8–22)
BZE UR QL SCN: NEGATIVE
CALCIUM SERPL-MCNC: 9.5 MG/DL (ref 8.5–10.5)
CANNABINOIDS UR QL SCN: NEGATIVE
CHLORIDE SERPL-SCNC: 101 MMOL/L (ref 96–112)
CO2 SERPL-SCNC: 21 MMOL/L (ref 20–33)
COLOR UR: COLORLESS
CREAT SERPL-MCNC: 0.92 MG/DL (ref 0.5–1.4)
CULTURE IF INDICATED INDCX: NO UA CULTURE
EKG IMPRESSION: NORMAL
EOSINOPHIL # BLD AUTO: 0.02 K/UL (ref 0–0.51)
EOSINOPHIL NFR BLD: 0.3 % (ref 0–6.9)
ERYTHROCYTE [DISTWIDTH] IN BLOOD BY AUTOMATED COUNT: 38.4 FL (ref 35.9–50)
ETHANOL BLD-MCNC: 0 G/DL
FLUAV+FLUBV AG SPEC QL IA: NORMAL
GFR SERPL CREATININE-BSD FRML MDRD: >60 ML/MIN/1.73 M 2
GLOBULIN SER CALC-MCNC: 2.6 G/DL (ref 1.9–3.5)
GLUCOSE SERPL-MCNC: 111 MG/DL (ref 65–99)
GLUCOSE UR STRIP.AUTO-MCNC: NEGATIVE MG/DL
HCT VFR BLD AUTO: 42.4 % (ref 37–47)
HGB BLD-MCNC: 15 G/DL (ref 12–16)
IMM GRANULOCYTES # BLD AUTO: 0.02 K/UL (ref 0–0.11)
IMM GRANULOCYTES NFR BLD AUTO: 0.3 % (ref 0–0.9)
KETONES UR STRIP.AUTO-MCNC: NEGATIVE MG/DL
LEUKOCYTE ESTERASE UR QL STRIP.AUTO: NEGATIVE
LYMPHOCYTES # BLD AUTO: 2.03 K/UL (ref 1–4.8)
LYMPHOCYTES NFR BLD: 34.8 % (ref 22–41)
MCH RBC QN AUTO: 29.6 PG (ref 27–33)
MCHC RBC AUTO-ENTMCNC: 35.4 G/DL (ref 33.6–35)
MCV RBC AUTO: 83.8 FL (ref 81.4–97.8)
MDMA UR QL SCN: NEGATIVE
METHADONE UR QL SCN: NEGATIVE
MICRO URNS: NORMAL
MONOCYTES # BLD AUTO: 0.37 K/UL (ref 0–0.85)
MONOCYTES NFR BLD AUTO: 6.3 % (ref 0–13.4)
NEUTROPHILS # BLD AUTO: 3.38 K/UL (ref 2–7.15)
NEUTROPHILS NFR BLD: 58 % (ref 44–72)
NITRITE UR QL STRIP.AUTO: NEGATIVE
NRBC # BLD AUTO: 0 K/UL
NRBC BLD AUTO-RTO: 0 /100 WBC
OPIATES UR QL SCN: NEGATIVE
OXYCODONE UR QL SCN: NEGATIVE
PCP UR QL SCN: NEGATIVE
PH UR STRIP.AUTO: 7 [PH]
PLATELET # BLD AUTO: 269 K/UL (ref 164–446)
PMV BLD AUTO: 9.3 FL (ref 9–12.9)
POTASSIUM SERPL-SCNC: 3.3 MMOL/L (ref 3.6–5.5)
PROPOXYPH UR QL SCN: NEGATIVE
PROT SERPL-MCNC: 7.1 G/DL (ref 6–8.2)
PROT UR QL STRIP: NEGATIVE MG/DL
RBC # BLD AUTO: 5.06 M/UL (ref 4.2–5.4)
RBC UR QL AUTO: NEGATIVE
SIGNIFICANT IND 70042: NORMAL
SITE SITE: NORMAL
SODIUM SERPL-SCNC: 132 MMOL/L (ref 135–145)
SOURCE SOURCE: NORMAL
SP GR UR STRIP.AUTO: 1
TROPONIN I SERPL-MCNC: <0.01 NG/ML (ref 0–0.04)
TSH SERPL DL<=0.005 MIU/L-ACNC: 0.43 UIU/ML (ref 0.3–3.7)
WBC # BLD AUTO: 5.8 K/UL (ref 4.8–10.8)

## 2017-06-11 PROCEDURE — 36415 COLL VENOUS BLD VENIPUNCTURE: CPT

## 2017-06-11 PROCEDURE — 80053 COMPREHEN METABOLIC PANEL: CPT

## 2017-06-11 PROCEDURE — 85025 COMPLETE CBC W/AUTO DIFF WBC: CPT

## 2017-06-11 PROCEDURE — 84484 ASSAY OF TROPONIN QUANT: CPT

## 2017-06-11 PROCEDURE — 700111 HCHG RX REV CODE 636 W/ 250 OVERRIDE (IP): Performed by: EMERGENCY MEDICINE

## 2017-06-11 PROCEDURE — 80307 DRUG TEST PRSMV CHEM ANLYZR: CPT

## 2017-06-11 PROCEDURE — 99285 EMERGENCY DEPT VISIT HI MDM: CPT

## 2017-06-11 PROCEDURE — 93005 ELECTROCARDIOGRAM TRACING: CPT | Performed by: EMERGENCY MEDICINE

## 2017-06-11 PROCEDURE — 87400 INFLUENZA A/B EACH AG IA: CPT

## 2017-06-11 PROCEDURE — 90791 PSYCH DIAGNOSTIC EVALUATION: CPT

## 2017-06-11 PROCEDURE — 96374 THER/PROPH/DIAG INJ IV PUSH: CPT

## 2017-06-11 PROCEDURE — 71010 DX-CHEST-PORTABLE (1 VIEW): CPT

## 2017-06-11 PROCEDURE — 84443 ASSAY THYROID STIM HORMONE: CPT

## 2017-06-11 PROCEDURE — 81003 URINALYSIS AUTO W/O SCOPE: CPT | Mod: 59

## 2017-06-11 RX ORDER — ALPRAZOLAM 0.25 MG/1
0.25 TABLET ORAL NIGHTLY PRN
Qty: 30 TAB | Refills: 0 | Status: SHIPPED | OUTPATIENT
Start: 2017-06-11 | End: 2019-03-27

## 2017-06-11 RX ORDER — LORAZEPAM 2 MG/ML
1 INJECTION INTRAMUSCULAR ONCE
Status: COMPLETED | OUTPATIENT
Start: 2017-06-11 | End: 2017-06-11

## 2017-06-11 RX ADMIN — LORAZEPAM 1 MG: 2 INJECTION INTRAMUSCULAR; INTRAVENOUS at 11:30

## 2017-06-11 ASSESSMENT — PAIN SCALES - GENERAL
PAINLEVEL_OUTOF10: 0
PAINLEVEL_OUTOF10: 0

## 2017-06-11 NOTE — DISCHARGE INSTRUCTIONS
Panic Attacks  Panic attacks are sudden, short-lived surges of severe anxiety, fear, or discomfort. They may occur for no reason when you are relaxed, when you are anxious, or when you are sleeping. Panic attacks may occur for a number of reasons:   · Healthy people occasionally have panic attacks in extreme, life-threatening situations, such as war or natural disasters. Normal anxiety is a protective mechanism of the body that helps us react to danger (fight or flight response).  · Panic attacks are often seen with anxiety disorders, such as panic disorder, social anxiety disorder, generalized anxiety disorder, and phobias. Anxiety disorders cause excessive or uncontrollable anxiety. They may interfere with your relationships or other life activities.  · Panic attacks are sometimes seen with other mental illnesses, such as depression and posttraumatic stress disorder.  · Certain medical conditions, prescription medicines, and drugs of abuse can cause panic attacks.  SYMPTOMS   Panic attacks start suddenly, peak within 20 minutes, and are accompanied by four or more of the following symptoms:  · Pounding heart or fast heart rate (palpitations).  · Sweating.  · Trembling or shaking.  · Shortness of breath or feeling smothered.  · Feeling choked.  · Chest pain or discomfort.  · Nausea or strange feeling in your stomach.  · Dizziness, light-headedness, or feeling like you will faint.  · Chills or hot flushes.  · Numbness or tingling in your lips or hands and feet.  · Feeling that things are not real or feeling that you are not yourself.  · Fear of losing control or going crazy.  · Fear of dying.  Some of these symptoms can mimic serious medical conditions. For example, you may think you are having a heart attack. Although panic attacks can be very scary, they are not life threatening.  DIAGNOSIS   Panic attacks are diagnosed through an assessment by your health care provider. Your health care provider will ask  questions about your symptoms, such as where and when they occurred. Your health care provider will also ask about your medical history and use of alcohol and drugs, including prescription medicines. Your health care provider may order blood tests or other studies to rule out a serious medical condition. Your health care provider may refer you to a mental health professional for further evaluation.  TREATMENT   · Most healthy people who have one or two panic attacks in an extreme, life-threatening situation will not require treatment.  · The treatment for panic attacks associated with anxiety disorders or other mental illness typically involves counseling with a mental health professional, medicine, or a combination of both. Your health care provider will help determine what treatment is best for you.  · Panic attacks due to physical illness usually go away with treatment of the illness. If prescription medicine is causing panic attacks, talk with your health care provider about stopping the medicine, decreasing the dose, or substituting another medicine.  · Panic attacks due to alcohol or drug abuse go away with abstinence. Some adults need professional help in order to stop drinking or using drugs.  HOME CARE INSTRUCTIONS   · Take all medicines as directed by your health care provider.    · Schedule and attend follow-up visits as directed by your health care provider. It is important to keep all your appointments.  SEEK MEDICAL CARE IF:  · You are not able to take your medicines as prescribed.  · Your symptoms do not improve or get worse.  SEEK IMMEDIATE MEDICAL CARE IF:   · You experience panic attack symptoms that are different than your usual symptoms.  · You have serious thoughts about hurting yourself or others.  · You are taking medicine for panic attacks and have a serious side effect.  MAKE SURE YOU:  · Understand these instructions.  · Will watch your condition.  · Will get help right away if you are not  doing well or get worse.     This information is not intended to replace advice given to you by your health care provider. Make sure you discuss any questions you have with your health care provider.     Document Released: 12/18/2006 Document Revised: 12/23/2014 Document Reviewed: 08/01/2014  Elsevier Interactive Patient Education ©2016 Elsevier Inc.

## 2017-06-11 NOTE — ED AVS SNAPSHOT
Learner[employer] Access Code: [email or web address]  Expires: 6/28/2017 10:47 AM    Your email address is not on file at [facility].  Email Addresses are required for you to sign up for [employer]ner[employer], please contact [phone] to verify your personal information and to provide your email address prior to attempting to register for Learneroo.    Sena Vazquez LeonIsabel MAZA [address]  [place] [address]    Learneroo  A secure, online tool to manage your health information     [facility]’s Learneroo® is a secure, online tool that connects you to your personalized health information from the privacy of your home -- day or night - making it very easy for you to manage your healthcare. Once the activation process is completed, you can even access your medical information using the Learneroo karan, which is available for free in the Apple Karan store or Google Play store.     To learn more about Learneroo, visit [email or web address]    There are two levels of access available (as shown below):   My Chart Features  [facility] Primary Care Doctor [facility]  Specialists [facility]  Urgent  Care Non-[facility] Primary Care Doctor   Email your healthcare team securely and privately 24/7 X X X    Manage appointments: schedule your next appointment; view details of past/upcoming appointments X      Request prescription refills. X      View recent personal medical records, including lab and immunizations X X X X   View health record, including health history, allergies, medications X X X X   Read reports about your outpatient visits, procedures, consult and ER notes X X X X   See your discharge summary, which is a recap of your hospital and/or ER visit that includes your diagnosis, lab results, and care plan X X  X     How to register for Learneroo:  Once your e-mail address has been verified, follow the following steps to sign up for Learneroo.     1. Go to  [email or web address]  2. Click on the Sign Up Now box, which takes you to the New Member Sign Up page.  You will need to provide the following information:  a. Enter your [employer] Access Code exactly as it appears at the top of this page. (You will not need to use this code after you’ve completed the sign-up process. If you do not sign up before the expiration date, you must request a new code.)   b. Enter your date of birth.   c. Enter your home email address.   d. Click Submit, and follow the next screen’s instructions.  3. Create a [email or web address].[email or web address] ID. This will be your Viralica login ID and cannot be changed, so think of one that is secure and easy to remember.  4. Create a [employer] password. You can change your password at any time.  5. Enter your Password Reset Question and Answer. This can be used at a later time if you forget your password.   6. Enter your e-mail address. This allows you to receive e-mail notifications when new information is available in [employer].  7. Click Sign Up. You can now view your health information.    For assistance activating your [employer] account, call [phone]

## 2017-06-11 NOTE — ED AVS SNAPSHOT
6/11/2017    Sena Taylor Shepherd  [address]  [facility] [address]    Dear Sena:    [facility] wants to ensure your discharge home is safe and you or your loved ones have had all of your questions answered regarding your care after you leave the hospital.    Below is a list of resources and contact information should you have any questions regarding your hospital stay, follow-up instructions, or active medical symptoms.    Questions or Concerns Regarding… Contact   Medical Questions Related to Your Discharge  (7 days a week, 8am-5pm) Contact a Nurse Care Coordinator   [phone]   Medical Questions Not Related to Your Discharge  (24 hours a day / 7 days a week)  Contact the Nurse Health Line   [phone]    Medications or Discharge Instructions Refer to your discharge packet   or contact your [facility] Primary Care Provider   [phone]   Follow-up Appointment(s) Schedule your appointment via [facility]   or contact Scheduling [phone]   Billing Review your statement via [facility]  or contact Billing [phone]   Medical Records Review your records via [facility]   or contact Medical Records [phone]     You may receive a telephone call within two days of discharge. This call is to make certain you understand your discharge instructions and have the opportunity to have any questions answered. You can also easily access your medical information, test results and upcoming appointments via the [facility] free online health management tool. You can learn more and sign up at [employer]. For assistance setting up your [facility] [employer] account, please call [phone].    Once again, we want to ensure your discharge home is safe and that you have a clear understanding of any next steps in your care. If you have any questions or concerns, please do not hesitate to contact us, we are here for you. Thank you for choosing [facility] for your healthcare needs.    Sincerely,    Your [facility] Healthcare Team

## 2017-06-11 NOTE — ED PROVIDER NOTES
ED Provider Note    Scribed for Arian Cabello D.O. by Anna Thompson. 6/11/2017  10:58 AM    Primary care provider: Moses Handy M.D.      CHIEF COMPLAINT  Chief Complaint   Patient presents with   • Other     body pain       HPI  Sena Shepherd is a 62 y.o. female who presents to the Emergency Department vague complaints of overall body pain. She is states that she is extremely anxious and stopped taking her Xanax medication since then she's had increasing body aches and is asking for help. She denies fever, shakes, chills, sweats, nausea, vomiting, painful urination, diarrhea, chest pain, shortness of breath, alcohol use or drug use.       REVIEW OF SYSTEMS  Pertinent positives include overall weakness and anxiety. Pertinent negatives include no fever, nausea, vomiting, trauma, abdominal pain.  All other systems reviewed and negative.    PAST MEDICAL HISTORY  Past Medical History   Diagnosis Date   • Arthralgia    • Depression    • GERD (gastroesophageal reflux disease)    • Vitamin d deficiency    • Menopause    • Hypothyroid    • Hyperlipidemia    • Hypertension        SURGICAL HISTORY  Past Surgical History   Procedure Laterality Date   • Cholecystectomy     • Cholecystectomy  2/2016        SOCIAL HISTORY  Social History   Substance Use Topics   • Smoking status: Never Smoker    • Smokeless tobacco: Never Used   • Alcohol Use: No      History   Drug Use No       FAMILY HISTORY  No family history on file.    CURRENT MEDICATIONS  Home Medications     Reviewed by Mame Jenkins R.N. (Registered Nurse) on 06/11/17 at 1044  Med List Status: Unable to Obtain    Medication Last Dose Status    alprazolam (XANAX) 0.25 MG Tab  Active    atenolol (TENORMIN) 25 MG Tab 5/29/2017 Active    dicyclomine (BENTYL) 10 MG Cap 5/29/2017 Active    fexofenadine (ALLEGRA) 30 MG tablet PRN Active    hydrocodone-acetaminophen (NORCO) 5-325 MG Tab per tablet  Active    lamotrigine (LAMICTAL) 25 MG  "Tab 5/29/2017 Active    lurasidone (LATUDA) 40 MG Tab 5/28/2017 Active    omeprazole (PRILOSEC) 20 MG delayed-release capsule  Active    sucralfate (CARAFATE) 1 GM Tab  Active                ALLERGIES  Allergies   Allergen Reactions   • Nkda [No Known Drug Allergy]        PHYSICAL EXAM  VITAL SIGNS: /79 mmHg  Pulse 66  Temp(Src) 37.1 °C (98.8 °F)  Resp 16  Ht 1.6 m (5' 3\")  Wt 65.7 kg (144 lb 13.5 oz)  BMI 25.66 kg/m2  SpO2 96%    Nursing notes and vitals reviewed.  Constitutional: Well developed, Well nourished, moderate distress, Non-toxic appearance.   Eyes: PERRLA, EOMI, Conjunctiva normal, No discharge.   Cardiovascular: Normal heart rate, Normal rhythm, No murmurs, No rubs, No gallops.   Thorax & Lungs: No respiratory distress, No rales, No rhonchi, No wheezing, No chest tenderness.   Abdomen: Bowel sounds normal, Soft, No tenderness, No guarding, No rebound, No masses, No pulsatile masses.   Skin: Warm, Dry, No erythema, No rash.   Musculoskeletal: Intact distal pulses, No edema, No cyanosis, No clubbing. Good range of motion in all major joints. No tenderness to palpation or major deformities noted, no CVA tenderness, no midline back tenderness.   Neurologic: Alert & oriented x 3, Normal motor function, Normal sensory function, No focal deficits noted.  Psychiatric: Anxious with pressured speech, stating that she has a cool bath, decreased blood flow to her left side her face, is asking for help.    DIAGNOSTIC STUDIES/PROCEDURES    LABS  Results for orders placed or performed during the hospital encounter of 06/11/17   CBC WITH DIFFERENTIAL   Result Value Ref Range    WBC 5.8 4.8 - 10.8 K/uL    RBC 5.06 4.20 - 5.40 M/uL    Hemoglobin 15.0 12.0 - 16.0 g/dL    Hematocrit 42.4 37.0 - 47.0 %    MCV 83.8 81.4 - 97.8 fL    MCH 29.6 27.0 - 33.0 pg    MCHC 35.4 (H) 33.6 - 35.0 g/dL    RDW 38.4 35.9 - 50.0 fL    Platelet Count 269 164 - 446 K/uL    MPV 9.3 9.0 - 12.9 fL    Neutrophils-Polys 58.00 44.00 - " 72.00 %    Lymphocytes 34.80 22.00 - 41.00 %    Monocytes 6.30 0.00 - 13.40 %    Eosinophils 0.30 0.00 - 6.90 %    Basophils 0.30 0.00 - 1.80 %    Immature Granulocytes 0.30 0.00 - 0.90 %    Nucleated RBC 0.00 /100 WBC    Neutrophils (Absolute) 3.38 2.00 - 7.15 K/uL    Lymphs (Absolute) 2.03 1.00 - 4.80 K/uL    Monos (Absolute) 0.37 0.00 - 0.85 K/uL    Eos (Absolute) 0.02 0.00 - 0.51 K/uL    Baso (Absolute) 0.02 0.00 - 0.12 K/uL    Immature Granulocytes (abs) 0.02 0.00 - 0.11 K/uL    NRBC (Absolute) 0.00 K/uL   COMP METABOLIC PANEL   Result Value Ref Range    Sodium 132 (L) 135 - 145 mmol/L    Potassium 3.3 (L) 3.6 - 5.5 mmol/L    Chloride 101 96 - 112 mmol/L    Co2 21 20 - 33 mmol/L    Anion Gap 10.0 0.0 - 11.9    Glucose 111 (H) 65 - 99 mg/dL    Bun 6 (L) 8 - 22 mg/dL    Creatinine 0.92 0.50 - 1.40 mg/dL    Calcium 9.5 8.5 - 10.5 mg/dL    AST(SGOT) 13 12 - 45 U/L    ALT(SGPT) 11 2 - 50 U/L    Alkaline Phosphatase 43 30 - 99 U/L    Total Bilirubin 1.0 0.1 - 1.5 mg/dL    Albumin 4.5 3.2 - 4.9 g/dL    Total Protein 7.1 6.0 - 8.2 g/dL    Globulin 2.6 1.9 - 3.5 g/dL    A-G Ratio 1.7 g/dL   TROPONIN   Result Value Ref Range    Troponin I <0.01 0.00 - 0.04 ng/mL   URINALYSIS CULTURE, IF INDICATED   Result Value Ref Range    Color Colorless     Character Clear     Specific Gravity 1.002 <1.035    Ph 7.0 5.0-8.0    Glucose Negative Negative mg/dL    Ketones Negative Negative mg/dL    Protein Negative Negative mg/dL    Bilirubin Negative Negative    Nitrite Negative Negative    Leukocyte Esterase Negative Negative    Occult Blood Negative Negative    Micro Urine Req see below     Culture Indicated No UA Culture   INFLUENZA RAPID   Result Value Ref Range    Significant Indicator NEG     Source RESP     Site Nasopharyngeal     Rapid Influenza A-B       Negative for Influenza A and Influenza B antigens.  Infection due to influenza A or B cannot be ruled out  since the antigen present in the specimen may be below the  detection  limit of the test. Culture confirmation of  negative samples is recommended.     URINE DRUG SCREEN   Result Value Ref Range    Amphetamines Urine Negative Negative    Barbiturates Negative Negative    Benzodiazepines Negative Negative    Cocaine Metabolite Negative Negative    Methadone Negative Negative    Ecstasy Negative Negative    Opiates Negative Negative    Oxycodone Negative Negative    Phencyclidine -Pcp Negative Negative    Propoxyphene Negative Negative    Cannabinoid Metab Negative Negative   DIAGNOSTIC ALCOHOL   Result Value Ref Range    Diagnostic Alcohol 0.00 0.00 g/dL   TSH   Result Value Ref Range    TSH 0.430 0.300 - 3.700 uIU/mL   ESTIMATED GFR   Result Value Ref Range    GFR If African American >60 >60 mL/min/1.73 m 2    GFR If Non African American >60 >60 mL/min/1.73 m 2   EKG (ER)   Result Value Ref Range    Report       [facility] Emergency Dept.    Test Date:  2017  Pt Name:    MIREYA PATHAK           Department: ER  MRN:        [id number]                      Room:       [facility]  Gender:     F                            [facility]  :        [id number]                   Requested By:MEDINA CHESTER  Order #:    [id number]                    Reading MD: MEDINA CHESTER DO    Measurements  Intervals                                Axis  Rate:       65                           P:          31  KS:         160                          QRS:        12  QRSD:       76                           T:          26  QT:         396  QTc:        412    Interpretive Statements  SINUS RHYTHM  BORDERLINE T ABNORMALITIES, ANTERIOR LEADS  Compared to ECG 2016 06:56:10  No significant changes    Electronically Signed On 2017 15:37:45 PDT by MEDINA CHESTER DO               RADIOLOGY  DX-CHEST-PORTABLE (1 VIEW)   Final Result      Negative single view of the chest.        The radiologist's interpretation of all radiological studies have been reviewed  by me.    COURSE & MEDICAL DECISION MAKING  Pertinent Labs & Imaging studies reviewed. (See chart for details)    10:58 AM - Patient seen and examined at bedside. Patient will be treated with 1mg Ativan. Ordered DX-chest portable, estimated GFR, urine drug screen, diagnostic alcohol, CBC with differential, CMP, Troponin, Urinalysis culture, influenza rapid, TSH to evaluate her symptoms.   This is a 62-year-old female presents with anxiety. I seen her before for the same presentation. The patient a thorough workup has a negative troponin, negative EKG I do not believe she is experiencing acute coronary syndrome myocardial infarction. In addition, urinalysis is negative for infection, she has a negative urine drug screen, alcohol is negative as well. TSH is normal and do not believe she is experiencing thyroid storm. She has no significant I abnormality. I have given her Ativan 1 mg IV and on reevaluation she states that she is completely better and is very thankful. I discussed the patient with gene with micheal for evaluation and resources for possible management of anxiety. I prescribed her Xanax 0.25 mg #30 and she is to follow-up with her primary care physician and the referral per Konstantin with [facility].    I reviewed prescription monitoring program for patient's narcotic use before prescribing a scheduled drug.The patient will not drink alcohol nor drive with prescribed medications.The patient will return for new or worsening symptoms and is stable at the time of discharge.    The patient is referred to a primary physician for blood pressure management, diabetic screening, and for all other preventative health concerns.    DISPOSITION:  Patient will be discharged home in stable condition.    FOLLOW UP:  [facility], Emergency Dept  [address] [address] [phone]    If symptoms worsen    Moses Handy M.D.  [address]  [address]  [place] [facility]  [address]  [phone]    Schedule an appointment as soon as possible for a visit in 3 days        OUTPATIENT MEDICATIONS:  New Prescriptions    ALPRAZOLAM (XANAX) 0.25 MG TAB    Take 1 Tab by mouth at bedtime as needed for Anxiety.           FINAL IMPRESSION  1. Anxiety          Anna VIDAL (Scribe), am scribing for, and in the presence of, Arian Cabello D.O    Electronically signed by: Anna Thompson (Scribe), 6/11/2017    Arian VIDAL D.O. personally performed the services described in this documentation, as scribed by Anna Thompson in my presence, and it is both accurate and complete.    The note accurately reflects work and decisions made by me.  Arian Cabello  6/11/2017  2:59 PM

## 2017-06-11 NOTE — ED PROVIDER NOTES
Scribed for Darron Reece M.D. by Gabriella Wright. 6/11/2017  4:24 PM    This is an addendum to the note on this patient.  For further details and full chart information, see the previously signed note.      4:24 PM I received this patient from Dr. Cabello. Patient was evaluated by [facility] and a plan was made. [facility] is comfortable with discharging the patient at this time.     I, Gabriella Wright (Scribe), am scribing for, and in the presence of, Darron Reece M.D..    Electronically signed by: Gabriella Wright (Scribe), 6/11/2017    Darron VIDAL M.D. personally performed the services described in this documentation, as scribed by Gabriella Wright in my presence, and it is both accurate and complete.    The note accurately reflects work and decisions made by me.  Darron Reece  6/11/2017  6:18 PM

## 2017-06-11 NOTE — ED AVS SNAPSHOT
Home Care Instructions                                                                                                                Sena Shepherd   MRN: [id number]    Department:  [facility], Emergency Dept   Date of Visit:  6/11/2017            [facility], Emergency Dept    [address] [address]    Phone:  [phone]      You were seen by     1. Arian Cabello D.O.    2. Darron Reece M.D.      Your Diagnosis Was     Anxiety     F41.9       These are the medications you received during your hospitalization from 06/11/2017 0912 to 06/11/2017 1627     Date/Time Order Dose Route Action    06/11/2017 1130 lorazepam (ATIVAN) injection 1 mg 1 mg Intravenous Given      Follow-up Information     1. Follow up with [facility], Emergency Dept.    Specialty:  Emergency Medicine    Why:  If symptoms worsen    Contact information    [address] [place] [address] [phone]        2. Follow up with Moses Handy M.D.. Schedule an appointment as soon as possible for a visit in 3 days.    Specialty:  Internal Medicine    Contact information    [address] [facility]  [address] [place] [address]  [phone]        Medication Information     Review all of your home medications and newly ordered medications with your primary doctor and/or pharmacist as soon as possible. Follow medication instructions as directed by your doctor and/or pharmacist.     Please keep your complete medication list with you and share with your physician. Update the information when medications are discontinued, doses are changed, or new medications (including over-the-counter products) are added; and carry medication information at all times in the event of emergency situations.               Medication List      ASK your doctor about these medications        Instructions    Morning Afternoon Evening Bedtime    * alprazolam 0.25 MG Tabs    What changed:  Another medication with the same name was added. Make sure you understand how and when to take each.   Commonly known as:  XANAX   Ask about: Which instructions should I use?        Take 1 Tab by mouth 3 times a day as needed for Anxiety.   Dose:  0.25 mg                        * alprazolam 0.25 MG Tabs   What changed:  You were already taking a medication with the same name, and this prescription was added. Make sure you understand how and when to take each.   Commonly known as:  XANAX   Ask about: Which instructions should I use?        Take 1 Tab by mouth at bedtime as needed for Anxiety.   Dose:  0.25 mg                        atenolol 25 MG Tabs   Commonly known as:  TENORMIN        Take 25 mg by mouth every day.   Dose:  25 mg                        dicyclomine 10 MG Caps   Commonly known as:  BENTYL        Take 10 mg by mouth 4 Times a Day,Before Meals and at Bedtime.   Dose:  10 mg                        fexofenadine 30 MG tablet   Commonly known as:  ALLEGRA        Take 1 Tab by mouth 2 times a day.   Dose:  30 mg                        hydrocodone-acetaminophen 5-325 MG Tabs per tablet   Commonly known as:  NORCO        Take 1-2 Tabs by mouth every 6 hours as needed.   Dose:  1-2 Tab                        lamotrigine 25 MG Tabs   Commonly known as:  LAMICTAL        Take 25 mg by mouth every day.   Dose:  25 mg                        LATUDA 40 MG Tabs   Generic drug:  lurasidone        Take 40 mg by mouth 2 times a day, with meals.   Dose:  40 mg                        omeprazole 20 MG delayed-release capsule   Commonly known as:  PRILOSEC        Take 20 mg by mouth every day.   Dose:  20 mg                        sucralfate 1 GM Tabs   Commonly known as:  CARAFATE        Take 1 Tab by mouth 4 Times a Day,Before Meals and at Bedtime.   Dose:  1 g                        * Notice:  This list has 2 medication(s) that are the same as other medications prescribed for you. Read the directions  carefully, and ask your doctor or other care provider to review them with you.         Where to Get Your Medications      You can get these medications from any pharmacy     Bring a paper prescription for each of these medications    - alprazolam 0.25 MG Tabs            Procedures and tests performed during your visit     CBC WITH DIFFERENTIAL    COMP METABOLIC PANEL    DIAGNOSTIC ALCOHOL    DX-CHEST-PORTABLE (1 VIEW)    EKG (ER)    ESTIMATED GFR    INFLUENZA RAPID    TROPONIN    TSH    URINALYSIS CULTURE, IF INDICATED    URINE DRUG SCREEN        Discharge Instructions         Panic Attacks  Panic attacks are sudden, short-lived surges of severe anxiety, fear, or discomfort. They may occur for no reason when you are relaxed, when you are anxious, or when you are sleeping. Panic attacks may occur for a number of reasons:   · Healthy people occasionally have panic attacks in extreme, life-threatening situations, such as war or natural disasters. Normal anxiety is a protective mechanism of the body that helps us react to danger (fight or flight response).  · Panic attacks are often seen with anxiety disorders, such as panic disorder, social anxiety disorder, generalized anxiety disorder, and phobias. Anxiety disorders cause excessive or uncontrollable anxiety. They may interfere with your relationships or other life activities.  · Panic attacks are sometimes seen with other mental illnesses, such as depression and posttraumatic stress disorder.  · Certain medical conditions, prescription medicines, and drugs of abuse can cause panic attacks.  SYMPTOMS   Panic attacks start suddenly, peak within 20 minutes, and are accompanied by four or more of the following symptoms:  · Pounding heart or fast heart rate (palpitations).  · Sweating.  · Trembling or shaking.  · Shortness of breath or feeling smothered.  · Feeling choked.  · Chest pain or discomfort.  · Nausea or strange feeling in your stomach.  · Dizziness,  light-headedness, or feeling like you will faint.  · Chills or hot flushes.  · Numbness or tingling in your lips or hands and feet.  · Feeling that things are not real or feeling that you are not yourself.  · Fear of losing control or going crazy.  · Fear of dying.  Some of these symptoms can mimic serious medical conditions. For example, you may think you are having a heart attack. Although panic attacks can be very scary, they are not life threatening.  DIAGNOSIS   Panic attacks are diagnosed through an assessment by your health care provider. Your health care provider will ask questions about your symptoms, such as where and when they occurred. Your health care provider will also ask about your medical history and use of alcohol and drugs, including prescription medicines. Your health care provider may order blood tests or other studies to rule out a serious medical condition. Your health care provider may refer you to a mental health professional for further evaluation.  TREATMENT   · Most healthy people who have one or two panic attacks in an extreme, life-threatening situation will not require treatment.  · The treatment for panic attacks associated with anxiety disorders or other mental illness typically involves counseling with a mental health professional, medicine, or a combination of both. Your health care provider will help determine what treatment is best for you.  · Panic attacks due to physical illness usually go away with treatment of the illness. If prescription medicine is causing panic attacks, talk with your health care provider about stopping the medicine, decreasing the dose, or substituting another medicine.  · Panic attacks due to alcohol or drug abuse go away with abstinence. Some adults need professional help in order to stop drinking or using drugs.  HOME CARE INSTRUCTIONS   · Take all medicines as directed by your health care provider.    · Schedule and attend follow-up visits as directed  by your health care provider. It is important to keep all your appointments.  SEEK MEDICAL CARE IF:  · You are not able to take your medicines as prescribed.  · Your symptoms do not improve or get worse.  SEEK IMMEDIATE MEDICAL CARE IF:   · You experience panic attack symptoms that are different than your usual symptoms.  · You have serious thoughts about hurting yourself or others.  · You are taking medicine for panic attacks and have a serious side effect.  MAKE SURE YOU:  · Understand these instructions.  · Will watch your condition.  · Will get help right away if you are not doing well or get worse.     This information is not intended to replace advice given to you by your health care provider. Make sure you discuss any questions you have with your health care provider.     Document Released: 12/18/2006 Document Revised: 12/23/2014 Document Reviewed: 08/01/2014  [employer] Interactive Patient Education ©2016 [employer]              Patient Information     Patient Information    Following emergency treatment: all patient requiring follow-up care must return either to a private physician or a clinic if your condition worsens before you are able to obtain further medical attention, please return to the emergency room.     Billing Information    At [facility], we work to make the billing process streamlined for our patients.  Our Representatives are here to answer any questions you may have regarding your hospital bill.  If you have insurance coverage and have supplied your insurance information to us, we will submit a claim to your insurer on your behalf.  Should you have any questions regarding your bill, we can be reached online or by phone as follows:  Online: You are able pay your bills online or live chat with our representatives about any billing questions you may have. We are here to help Monday - Friday from 8:00am to 7:30pm and 9:00am - 12:00pm on Saturdays.  Please visit  [email or web address]  for more information.   Phone:  [phone] or [phone]    Please note that your emergency physician, surgeon, pathologist, radiologist, anesthesiologist, and other specialists are not employed by [facility] and will therefore bill separately for their services.  Please contact them directly for any questions concerning their bills at the numbers below:     Emergency Physician Services:  [phone]  [facility]:  [phone]  [facility]:  [phone]  [facility]:  [phone]    1. Your final bill may vary from the amount quoted upon discharge if all procedures are not complete at that time, or if your doctor has additional procedures of which we are not aware. You will receive an additional bill if you return to the Emergency Department at [facility] for suture removal regardless of the facility of which the sutures were placed.     2. Please arrange for settlement of this account at the emergency registration.    3. All self-pay accounts are due in full at the time of treatment.  If you are unable to meet this obligation then payment is expected within 4-5 days.     4. If you have had radiology studies (CT, X-ray, Ultrasound, MRI), you have received a preliminary result during your emergency department visit. Please contact the radiology department [phone] to receive a copy of your final result. Please discuss the Final result with your primary physician or with the follow up physician provided.     Crisis Hotline:  [place] Crisis Hotline:  1-233-FWBJLSE or 1-194.107.2838  [place] Crisis Hotline:    1-926.136.6682 or 036-486-4172         ED Discharge Follow Up Questions    1. In order to provide you with very good care, we would like to follow up with a phone call in the next few days.  May we have your permission to contact you?     YES /  NO    2. What is the best phone number to call  you? (       )_____-__________    3. What is the best time to call you?      Morning  /  Afternoon  /  Evening                   Patient Signature:  ____________________________________________________________    Date:  ____________________________________________________________      Your appointments     Jul 25, 2017  7:20 AM   Established Patient with Perla Whalen M.D.   [facility])    [address], [address]  [facility] [address]   [phone]           You will be receiving a confirmation call a few days before your appointment from our automated call confirmation system.

## 2017-06-11 NOTE — ED NOTES
"Pt arrived pov with c/o generalized body pain, feels dehydrated. Not eating as much as normal. Stated this has been going on for \"a while\". Pt denies n/v/d but stated she is using bathroom every 15 min. Pt tearful.   "

## 2017-06-11 NOTE — ED NOTES
Pt discharge home. Pt given discharge instructions and prescription. Pt verbalized understanding, all questions answered ,vss upon d/c. Pt steady on feet upon discharge, family will be driving her home

## 2017-06-12 NOTE — CONSULTS
"[facility]   TRIAGE ASSESSMENT    Name: Sena Shepherd  MRN: [id number]  : 1955  Age: 62 y.o.  Date of assessment: 2017  PCP: Moses Handy M.D.  Persons in attendance: Patient and renuka Chang    CHIEF COMPLAINT/PRESENTING ISSUE as stated by Matt Pryor team RN, patient is having a lot of anxiety.    Information collected:  It appears that her doctor is attempting to taper her down on her benzos regiment and it is not going well.  Also she is on antibiotics for her stomach problem so she is not eating very much.  And her sleep is poor due to her anxiety.  She has Ativan 1 mg TID but she did not understand  That it was for her anxiety.  \"I take that and I shake.\"  She denies any SI, HI or any hallucinations or delusions.  Dr Reece gave her a prescriptions of Xanax.  She has been seen at [facility] by Dr Lazcano and outpt by Dr Diallo.  She has a PCP, BRANDI Stahl.    Chief Complaint   Patient presents with   • Other     body pain        CURRENT LIVING SITUATION/SOCIAL SUPPORT: Lives alone but her niece is going to stay with her for a few days.    BEHAVIORAL HEALTH TREATMENT HISTORY  Does patient/parent report a history of prior behavioral health treatment for patient?   Yes:    Dates Level of Care Facilty/Provider Diagnosis/Problem Medications    inpt [facility]      outpt Dr Diallo                                                                   SAFETY ASSESSMENT - SELF  Does patient acknowledge current or past symptoms of dangerousness to self? no  Does parent/significant other report patient has current or past symptoms of dangerousness to self? N\A  Does presenting problem suggest symptoms of dangerousness to self? No    SAFETY ASSESSMENT - OTHERS  Does patient acknowledge current or past symptoms of aggressive behavior or risk to others? no  Does parent/significant other report patient has current or past symptoms of aggressive behavior or risk to " "others?  N\A  Does presenting problem suggest symptoms of dangerousness to others? No    Crisis Safety Plan completed and copy given to patient? N\A    ABUSE/NEGLECT SCREENING  Does patient report feeling “unsafe” in his/her home, or afraid of anyone?  no  Does patient report any history of physical, sexual, or emotional abuse?  no  Does parent or significant other report any of the above? N\A  Is there evidence of neglect by self?  no  Is there evidence of neglect by a caregiver? no  Does the patient/parent report any history of CPS/APS/police involvement related to suspected abuse/neglect or domestic violence? no  Based on the information provided during the current assessment, is a mandated report of suspected abuse/neglect being made?  No    SUBSTANCE USE SCREENING  Yes:  Konstantin all substances used in the past 30 days:  Denies      Last Use Amount   []   Alcohol     []   Marijuana     []   Heroin     []   Prescription Opioids  (used without prescription, for    recreation, or in excess of prescribed amount)     []   Other Prescription  (used without prescription, for    recreation, or in excess of prescribed amount)     []   Cocaine      []   Methamphetamine     []   \"\" drugs (ectasy, MDMA)     []   Other substances        UDS results: negative  Breathalyzer results: 0.0    What consequences does the patient associate with any of the above substance use and or addictive behaviors? None    Risk factors for detox (check all that apply): Denies   []  Seizures   []  Diaphoretic (sweating)   []  Tremors   []  Hallucinations   []  Increased blood pressure   []  Decreased blood pressure   []  Other   []  None      [] Patient education on risk factors for detoxification and instructed to return to ER as needed.      MENTAL STATUS   Participation: Active verbal participation  Grooming: Casual  Orientation: Alert and Fully Oriented  Behavior: Tense  Eye contact: Good  Mood: Anxious  Affect: Flexible, Full range and " Anxious  Thought process: Logical  Thought content: Within normal limits  Speech: Volume within normal limits  Perception: Within normal limits  Memory:  No gross evidence of memory deficits  Insight: Adequate  Judgment:  Adequate  Other:    Collateral information:   Source:  [] Significant other present in person:   [] Significant other by telephone  [] [facility]   [] [facility]  [x] [facility]  [] Other:     [] Unable to complete full assessment due to:  [] Acute intoxication  [] Patient declined to participate/engage  [] Patient verbally unresponsive  [] Significant cognitive deficits  [] Significant perceptual distortions or behavioral disorganization  [] Other:      CLINICAL IMPRESSIONS:  Primary:  Generalized anxiety disorder  Secondary:  Decreased benzos-taper       IDENTIFIED NEEDS/PLAN:  [Trigger DISPOSITION list for any items marked]    []  Imminent safety risk - self [] Imminent safety risk - others   []  Acute substance withdrawl []  Psychosis/Impaired reality testing   [x]  Mood/anxiety []  Substance use/Addictive behavior   []  Maladaptive behaviro []  Parent/child conflict   []  Family/Couples conflict [x]  Biomedical   []  Housing []  Financial   []   Legal  Occupational/Educational   []  Domestic violence []  Other:     Disposition: Refer to return to psychiatrist, FNP and obtain a therapist at [facility] due to no insurance.Error has Medicare and Medicaid.    Does patient express agreement with the above plan? yes    Referral appointment(s) scheduled? N\A    Alert team only:   I have discussed findings and recommendations with Dr. Reece who is in agreement with these recommendations.   Return to psychiatrist, FNP, and obtain a therapist-[place] or privately has Medicare and Medicaid FFS.    Referral documentation sent to the following facilities:  Resource list provided for support and counseling.    Kirstin Ladd R.N.  6/11/2017

## 2017-07-14 ENCOUNTER — HOSPITAL ENCOUNTER (EMERGENCY)
Dept: HOSPITAL 8 - ED | Age: 62
Discharge: HOME | End: 2017-07-14
Payer: MEDICARE

## 2017-07-14 VITALS — HEIGHT: 63 IN | BODY MASS INDEX: 24.61 KG/M2 | WEIGHT: 138.89 LBS

## 2017-07-14 VITALS — DIASTOLIC BLOOD PRESSURE: 82 MMHG | SYSTOLIC BLOOD PRESSURE: 132 MMHG

## 2017-07-14 DIAGNOSIS — R07.89: Primary | ICD-10-CM

## 2017-07-14 DIAGNOSIS — Z90.49: ICD-10-CM

## 2017-07-14 LAB
BUN SERPL-MCNC: 12 MG/DL (ref 7–18)
IS PT STATUS REG ER OR PRE ER?: YES

## 2017-07-14 PROCEDURE — 93005 ELECTROCARDIOGRAM TRACING: CPT

## 2017-07-14 PROCEDURE — 84443 ASSAY THYROID STIM HORMONE: CPT

## 2017-07-14 PROCEDURE — 36415 COLL VENOUS BLD VENIPUNCTURE: CPT

## 2017-07-14 PROCEDURE — 71010: CPT

## 2017-07-14 PROCEDURE — 85025 COMPLETE CBC W/AUTO DIFF WBC: CPT

## 2017-07-14 PROCEDURE — 80048 BASIC METABOLIC PNL TOTAL CA: CPT

## 2017-07-14 PROCEDURE — 84484 ASSAY OF TROPONIN QUANT: CPT

## 2017-07-14 PROCEDURE — 99285 EMERGENCY DEPT VISIT HI MDM: CPT

## 2017-07-14 PROCEDURE — 82040 ASSAY OF SERUM ALBUMIN: CPT

## 2017-07-14 PROCEDURE — 85379 FIBRIN DEGRADATION QUANT: CPT

## 2017-08-17 ENCOUNTER — DOCUMENTATION (OUTPATIENT)
Dept: BEHAVIORAL HEALTH | Facility: PHYSICIAN GROUP | Age: 62
End: 2017-08-17

## 2017-09-02 LAB
T4 FREE SERPL-MCNC: 1.49 NG/DL (ref 0.82–1.77)
TSH SERPL DL<=0.005 MIU/L-ACNC: 0.87 UIU/ML (ref 0.45–4.5)

## 2017-09-06 ENCOUNTER — OFFICE VISIT (OUTPATIENT)
Dept: ENDOCRINOLOGY | Facility: MEDICAL CENTER | Age: 62
End: 2017-09-06
Payer: MEDICARE

## 2017-09-06 VITALS
BODY MASS INDEX: 24.91 KG/M2 | SYSTOLIC BLOOD PRESSURE: 118 MMHG | OXYGEN SATURATION: 96 % | DIASTOLIC BLOOD PRESSURE: 72 MMHG | HEART RATE: 95 BPM | HEIGHT: 63 IN | WEIGHT: 140.6 LBS

## 2017-09-06 DIAGNOSIS — Z86.39 HISTORY OF HYPERTHYROIDISM: ICD-10-CM

## 2017-09-06 PROCEDURE — 99213 OFFICE O/P EST LOW 20 MIN: CPT | Performed by: INTERNAL MEDICINE

## 2017-09-06 NOTE — PROGRESS NOTES
"Endocrinology Clinic Progress Note    CC: History of subclinical hyperthyroidism    HPI:  Sena Shepherd is a 62 y.o. old patient who comes in today for routine follow up. She has been off methimazole for more than 4 months. Serial labs every 3 months have shown normal thyroid hormone levels for the past 9 months. Most recently labs were done last week and showed normal TSH.    ROS:  Constitutional: No unintentional weight loss    EXAM:  Vital signs: /72   Pulse 95   Ht 1.6 m (5' 3\")   Wt 63.8 kg (140 lb 9.6 oz)   SpO2 96%   BMI 24.91 kg/m²   General: No apparent distress, cooperative  Eyes: No scleral icterus, no discharge  Resp: Normal effort  Extremities: No lower extremity edema  Psych: Alert and oriented, normal mood and affect    Assessment and Plan:    1. History of hyperthyroidism  · She has been off methimazole for over 4 months, and has maintained normal thyroid hormone levels since then  · We discussed about symptom recognition of hyperthyroidism  · Repeat TSH in 6 months  - TSH WITH REFLEX TO FT4; Future    Return in about 6 months (around 3/6/2018).    Thank you for allowing me to participate in the care of this patient.    Perla Whalen M.D.    CC:   Mina Soliz M.D.    This note was created using voice recognition software (Dragon). The accuracy of the dictation is limited by the abilities of the software. I have reviewed the note prior to signing, however some errors in grammar and context are still possible. If you have any questions related to this note please do not hesitate to contact our office.         "

## 2017-09-27 ENCOUNTER — OFFICE VISIT (OUTPATIENT)
Dept: BEHAVIORAL HEALTH | Facility: PHYSICIAN GROUP | Age: 62
End: 2017-09-27
Payer: MEDICARE

## 2017-09-27 DIAGNOSIS — F41.0 SEVERE ANXIETY WITH PANIC: ICD-10-CM

## 2017-09-27 DIAGNOSIS — F33.1 MODERATE EPISODE OF RECURRENT MAJOR DEPRESSIVE DISORDER (HCC): ICD-10-CM

## 2017-09-27 PROCEDURE — 90791 PSYCH DIAGNOSTIC EVALUATION: CPT | Performed by: SOCIAL WORKER
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
## 2017-09-29 ENCOUNTER — HOSPITAL ENCOUNTER (OUTPATIENT)
Dept: RADIOLOGY | Facility: MEDICAL CENTER | Age: 62
End: 2017-09-29
Attending: INTERNAL MEDICINE
Payer: MEDICARE

## 2017-09-29 DIAGNOSIS — R11.0 NAUSEA: ICD-10-CM

## 2017-09-29 DIAGNOSIS — F41.9 ANXIETY HYPERVENTILATION: ICD-10-CM

## 2017-09-29 DIAGNOSIS — F41.0 PANIC DISORDER WITHOUT AGORAPHOBIA: ICD-10-CM

## 2017-09-29 DIAGNOSIS — F45.8 ANXIETY HYPERVENTILATION: ICD-10-CM

## 2017-09-29 DIAGNOSIS — R10.9 ABDOMINAL PAIN, UNSPECIFIED SITE: ICD-10-CM

## 2017-09-29 PROCEDURE — 74000 DX-ABDOMEN-1 VIEW: CPT

## 2017-10-09 ENCOUNTER — HOSPITAL ENCOUNTER (EMERGENCY)
Dept: HOSPITAL 8 - ED | Age: 62
Discharge: HOME | End: 2017-10-09
Payer: MEDICARE

## 2017-10-09 VITALS — HEIGHT: 63 IN | BODY MASS INDEX: 25.2 KG/M2 | WEIGHT: 142.2 LBS

## 2017-10-09 VITALS — DIASTOLIC BLOOD PRESSURE: 74 MMHG | SYSTOLIC BLOOD PRESSURE: 115 MMHG

## 2017-10-09 DIAGNOSIS — Z90.49: ICD-10-CM

## 2017-10-09 DIAGNOSIS — K29.00: Primary | ICD-10-CM

## 2017-10-09 DIAGNOSIS — G89.29: ICD-10-CM

## 2017-10-09 LAB
AST SERPL-CCNC: 12 U/L (ref 15–37)
BUN SERPL-MCNC: 10 MG/DL (ref 7–18)
HCT VFR BLD CALC: 46.1 % (ref 34.6–47.8)
HGB BLD-MCNC: 15.4 G/DL (ref 11.7–16.4)
IS PT STATUS REG ER OR PRE ER?: YES
WBC # BLD AUTO: 7.8 X10^3/UL (ref 3.4–10)

## 2017-10-09 PROCEDURE — 80053 COMPREHEN METABOLIC PANEL: CPT

## 2017-10-09 PROCEDURE — 85025 COMPLETE CBC W/AUTO DIFF WBC: CPT

## 2017-10-09 PROCEDURE — 93005 ELECTROCARDIOGRAM TRACING: CPT

## 2017-10-09 PROCEDURE — 36415 COLL VENOUS BLD VENIPUNCTURE: CPT

## 2017-10-09 PROCEDURE — 81003 URINALYSIS AUTO W/O SCOPE: CPT

## 2017-10-09 PROCEDURE — 84484 ASSAY OF TROPONIN QUANT: CPT

## 2017-10-09 PROCEDURE — 83690 ASSAY OF LIPASE: CPT

## 2017-10-09 PROCEDURE — 99285 EMERGENCY DEPT VISIT HI MDM: CPT

## 2017-12-28 LAB — TSH SERPL DL<=0.005 MIU/L-ACNC: 0.67 UIU/ML (ref 0.45–4.5)

## 2018-02-06 ENCOUNTER — OFFICE VISIT (OUTPATIENT)
Dept: ENDOCRINOLOGY | Facility: MEDICAL CENTER | Age: 63
End: 2018-02-06
Payer: MEDICARE

## 2018-02-06 VITALS
SYSTOLIC BLOOD PRESSURE: 118 MMHG | HEART RATE: 73 BPM | HEIGHT: 63 IN | BODY MASS INDEX: 27.46 KG/M2 | OXYGEN SATURATION: 95 % | WEIGHT: 155 LBS | DIASTOLIC BLOOD PRESSURE: 72 MMHG

## 2018-02-06 DIAGNOSIS — E04.2 MULTIPLE THYROID NODULES: ICD-10-CM

## 2018-02-06 DIAGNOSIS — R73.01 ELEVATED FASTING GLUCOSE: ICD-10-CM

## 2018-02-06 DIAGNOSIS — Z86.39 HISTORY OF HYPERTHYROIDISM: ICD-10-CM

## 2018-02-06 PROCEDURE — 99214 OFFICE O/P EST MOD 30 MIN: CPT | Performed by: INTERNAL MEDICINE

## 2018-02-06 NOTE — PROGRESS NOTES
"Endocrinology Clinic Progress Note    CC: History of hyperthyroidism    HPI:  Sena Shepherd is a 62 y.o. old patient who comes in today for routine follow up.     History of hyperthyroidism: She has been off methimazole for about 10 months now. Labs over the past 10 months has showed normal TSH. She denies any unintentional weight loss. No palpitations or racing heart.    Multiple thyroid nodules: Thyroid ultrasound in 2015 showed subcentimeter thyroid nodule. No family history of thyroid cancer. No difficulty swallowing or breathing.    History of elevated fasting blood sugar: Fasting blood sugar last year was normal, and she had normal A1c of 5.6%. Hemoglobin A1c N 2016 was 5.7%.    ROS:  Constitutional: No unintentional weight loss  Endo: Denies excessive thirst or frequent urination    PMH:  History of hyerothyroidism  Multiple thyroid nodules  History of elevated fasting blood glucose    EXAM:  Vital signs: /72   Pulse 73   Ht 1.6 m (5' 3\")   Wt 70.3 kg (155 lb)   SpO2 95%   BMI 27.46 kg/m²   General: No apparent distress, cooperative  Eyes: No scleral icterus, no discharge  Neck: Normal on external inspection  Resp: Normal effort  Skin: No rash on visible skin  Psych: Alert and oriented, normal mood and affect    Assessment and Plan:    1. History of hyperthyroidism  · Stay off methimazole  · Repeat labs in 6 months  - TSH; Future  - FREE THYROXINE; Future    2. Multiple thyroid nodules  · Repeat thyroid ultrasound in 6 months  - US-SOFT TISSUES OF HEAD - NECK; Future    3. Elevated fasting glucose  · Repeat fasting labs in 6 months  - BASIC METABOLIC PANEL; Future    Return in about 6 months (around 8/6/2018).    Thank you for allowing me to participate in the care of this patient.    Perla Whalen M.D.    CC:   Mina Soliz M.D.    This note was created using voice recognition software (Dragon). The accuracy of the dictation is limited by the abilities of the software. I " have reviewed the note prior to signing, however some errors in grammar and context are still possible. If you have any questions related to this note please do not hesitate to contact our office.

## 2018-03-19 ENCOUNTER — HOSPITAL ENCOUNTER (OUTPATIENT)
Dept: LAB | Facility: MEDICAL CENTER | Age: 63
End: 2018-03-19
Attending: INTERNAL MEDICINE
Payer: MEDICARE

## 2018-03-19 DIAGNOSIS — Z86.39 HISTORY OF HYPERTHYROIDISM: ICD-10-CM

## 2018-03-19 LAB — TSH SERPL DL<=0.005 MIU/L-ACNC: 0.8 UIU/ML (ref 0.38–5.33)

## 2018-03-19 PROCEDURE — 36415 COLL VENOUS BLD VENIPUNCTURE: CPT

## 2018-03-19 PROCEDURE — 84443 ASSAY THYROID STIM HORMONE: CPT

## 2018-03-22 ENCOUNTER — TELEPHONE (OUTPATIENT)
Dept: ENDOCRINOLOGY | Facility: MEDICAL CENTER | Age: 63
End: 2018-03-22

## 2018-03-22 NOTE — TELEPHONE ENCOUNTER
----- Message from Perla Whalen M.D. sent at 3/20/2018  2:03 PM PDT -----  Please inform patient that thyroid hormone levels are normal.

## 2018-07-31 ENCOUNTER — HOSPITAL ENCOUNTER (OUTPATIENT)
Dept: LAB | Facility: MEDICAL CENTER | Age: 63
End: 2018-07-31
Attending: INTERNAL MEDICINE
Payer: MEDICARE

## 2018-07-31 ENCOUNTER — HOSPITAL ENCOUNTER (OUTPATIENT)
Dept: RADIOLOGY | Facility: MEDICAL CENTER | Age: 63
End: 2018-07-31
Attending: INTERNAL MEDICINE
Payer: MEDICARE

## 2018-07-31 DIAGNOSIS — Z86.39 HISTORY OF HYPERTHYROIDISM: ICD-10-CM

## 2018-07-31 DIAGNOSIS — E04.2 MULTIPLE THYROID NODULES: ICD-10-CM

## 2018-07-31 DIAGNOSIS — R73.01 ELEVATED FASTING GLUCOSE: ICD-10-CM

## 2018-07-31 LAB
ANION GAP SERPL CALC-SCNC: 8 MMOL/L (ref 0–11.9)
BUN SERPL-MCNC: 13 MG/DL (ref 8–22)
CALCIUM SERPL-MCNC: 9.6 MG/DL (ref 8.5–10.5)
CHLORIDE SERPL-SCNC: 107 MMOL/L (ref 96–112)
CO2 SERPL-SCNC: 26 MMOL/L (ref 20–33)
CREAT SERPL-MCNC: 0.99 MG/DL (ref 0.5–1.4)
GLUCOSE SERPL-MCNC: 93 MG/DL (ref 65–99)
POTASSIUM SERPL-SCNC: 4.6 MMOL/L (ref 3.6–5.5)
SODIUM SERPL-SCNC: 141 MMOL/L (ref 135–145)
T4 FREE SERPL-MCNC: 0.78 NG/DL (ref 0.53–1.43)
TSH SERPL DL<=0.005 MIU/L-ACNC: 1.05 UIU/ML (ref 0.38–5.33)

## 2018-07-31 PROCEDURE — 84443 ASSAY THYROID STIM HORMONE: CPT

## 2018-07-31 PROCEDURE — 84439 ASSAY OF FREE THYROXINE: CPT

## 2018-07-31 PROCEDURE — 36415 COLL VENOUS BLD VENIPUNCTURE: CPT

## 2018-07-31 PROCEDURE — 76536 US EXAM OF HEAD AND NECK: CPT

## 2018-07-31 PROCEDURE — 80048 BASIC METABOLIC PNL TOTAL CA: CPT

## 2018-08-06 ENCOUNTER — OFFICE VISIT (OUTPATIENT)
Dept: ENDOCRINOLOGY | Facility: MEDICAL CENTER | Age: 63
End: 2018-08-06
Payer: MEDICARE

## 2018-08-06 VITALS
HEART RATE: 77 BPM | BODY MASS INDEX: 27.46 KG/M2 | SYSTOLIC BLOOD PRESSURE: 100 MMHG | OXYGEN SATURATION: 96 % | WEIGHT: 155 LBS | HEIGHT: 63 IN | DIASTOLIC BLOOD PRESSURE: 62 MMHG

## 2018-08-06 DIAGNOSIS — Z86.39 HISTORY OF HYPERTHYROIDISM: ICD-10-CM

## 2018-08-06 DIAGNOSIS — E04.2 MULTIPLE THYROID NODULES: ICD-10-CM

## 2018-08-06 DIAGNOSIS — R73.01 ELEVATED FASTING GLUCOSE: ICD-10-CM

## 2018-08-06 PROCEDURE — 99214 OFFICE O/P EST MOD 30 MIN: CPT | Performed by: INTERNAL MEDICINE

## 2018-08-06 NOTE — PROGRESS NOTES
"Endocrinology Clinic Progress Note    CC: History of hyperthyroidism    HPI:  Sena Shepherd is a 62 y.o. old patient who comes in today for routine follow up.     History of hyperthyroidism: She has been off methimazole for about 1 and half years.  Recent labs again showed normal TSH and free T4.  She denies any issues with unintentional weight loss.    Multiple thyroid nodules: Thyroid ultrasound in 2015 showed subcentimeter thyroid nodule. No family history of thyroid cancer. No difficulty swallowing or breathing.  Follow-up ultrasound in 2018 showed slight increase in the cystic nodule and the isthmus which measures 8.6 mm, and she also has a 6 mm cystic nodule in the right thyroid lobe.  No family history of thyroid cancer.    Elevated fasting blood sugar: Fasting blood sugar last year was 111, however recent fasting blood sugar is normal at 93.  She follows diet and lifestyle modification    ROS:  Constitutional: No unintentional weight loss  Endo: Denies excessive thirst or frequent urination    PMH:  History of hyerothyroidism  Multiple thyroid nodules  History of elevated fasting blood glucose    EXAM:  Vital signs: /62   Pulse 77   Ht 1.6 m (5' 3\")   Wt 70.3 kg (155 lb)   SpO2 96%   BMI 27.46 kg/m²   General: No apparent distress, cooperative  Eyes: No scleral icterus, no discharge  Neck: Normal on external inspection  Resp: Normal effort, clear to auscultation bilaterally  CVS: Regular rate and rhythm, S1 S2 normal  Musculoskeletal: Normal gait  Extremities: No lower extremity edema  Skin: No rash on visible skin  Psych: Alert and oriented, normal mood and affect    Assessment and Plan:    1. History of hyperthyroidism  · She has been off methimazole for 1-1/2 years and her TSH has been normal since  · Repeat labs in 6 months  - TSH; Future  - FREE THYROXINE; Future    2. Multiple thyroid nodules  · Advised to repeat thyroid ultrasound in one year    3. Elevated fasting " glucose  · Repeat fasting labs in 6 months  - BASIC METABOLIC PANEL; Future    Return in about 6 months (around 2/6/2019).    Thank you for allowing me to participate in the care of this patient.    Perla Whalen M.D.    CC:   Mina Soliz M.D.    This note was created using voice recognition software (Dragon). The accuracy of the dictation is limited by the abilities of the software. I have reviewed the note prior to signing, however some errors in grammar and context are still possible. If you have any questions related to this note please do not hesitate to contact our office.

## 2019-02-02 LAB
BUN SERPL-MCNC: 13 MG/DL (ref 8–27)
BUN/CREAT SERPL: 13 (ref 12–28)
CALCIUM SERPL-MCNC: 9.7 MG/DL (ref 8.7–10.3)
CHLORIDE SERPL-SCNC: 104 MMOL/L (ref 96–106)
CO2 SERPL-SCNC: 23 MMOL/L (ref 20–29)
CREAT SERPL-MCNC: 0.98 MG/DL (ref 0.57–1)
GLUCOSE SERPL-MCNC: 98 MG/DL (ref 65–99)
POTASSIUM SERPL-SCNC: 5.1 MMOL/L (ref 3.5–5.2)
SODIUM SERPL-SCNC: 141 MMOL/L (ref 134–144)
T4 FREE SERPL-MCNC: 1.08 NG/DL (ref 0.82–1.77)
TSH SERPL DL<=0.005 MIU/L-ACNC: 1.87 UIU/ML (ref 0.45–4.5)

## 2019-02-05 ENCOUNTER — TELEPHONE (OUTPATIENT)
Dept: ENDOCRINOLOGY | Facility: MEDICAL CENTER | Age: 64
End: 2019-02-05

## 2019-02-05 NOTE — TELEPHONE ENCOUNTER
Patient's care giver called to make a New to You appointment and asked if you could place someone labs?

## 2019-02-07 NOTE — TELEPHONE ENCOUNTER
Phone Number Called: [phone] (home)       Message: lm for care taker Zaheer that no extra blood work is needed at this time    Left Message for patient to call back: no

## 2019-03-27 ENCOUNTER — OFFICE VISIT (OUTPATIENT)
Dept: ENDOCRINOLOGY | Facility: MEDICAL CENTER | Age: 64
End: 2019-03-27
Payer: MEDICARE

## 2019-03-27 VITALS
RESPIRATION RATE: 16 BRPM | DIASTOLIC BLOOD PRESSURE: 72 MMHG | WEIGHT: 161 LBS | HEART RATE: 92 BPM | HEIGHT: 63 IN | BODY MASS INDEX: 28.53 KG/M2 | SYSTOLIC BLOOD PRESSURE: 116 MMHG | OXYGEN SATURATION: 93 %

## 2019-03-27 DIAGNOSIS — E78.5 HYPERLIPIDEMIA, UNSPECIFIED HYPERLIPIDEMIA TYPE: ICD-10-CM

## 2019-03-27 DIAGNOSIS — R73.09 ELEVATED GLUCOSE: ICD-10-CM

## 2019-03-27 DIAGNOSIS — R53.83 FATIGUE, UNSPECIFIED TYPE: ICD-10-CM

## 2019-03-27 DIAGNOSIS — E05.90 HYPERTHYROIDISM: ICD-10-CM

## 2019-03-27 DIAGNOSIS — E55.9 VITAMIN D DEFICIENCY: ICD-10-CM

## 2019-03-27 DIAGNOSIS — E04.2 MULTIPLE THYROID NODULES: ICD-10-CM

## 2019-03-27 PROCEDURE — 99214 OFFICE O/P EST MOD 30 MIN: CPT | Performed by: PHYSICIAN ASSISTANT

## 2019-03-27 RX ORDER — PAROXETINE HYDROCHLORIDE 20 MG/1
20 TABLET, FILM COATED ORAL DAILY
COMMUNITY
End: 2023-01-10

## 2019-03-27 RX ORDER — MIRTAZAPINE 15 MG/1
15 TABLET, FILM COATED ORAL NIGHTLY
COMMUNITY
End: 2023-01-10

## 2019-03-27 RX ORDER — DOXEPIN 3 MG/1
1 TABLET, FILM COATED ORAL NIGHTLY PRN
COMMUNITY
End: 2022-07-17

## 2019-03-27 NOTE — PROGRESS NOTES
Endocrinology Clinic Progress Note  PCP: Mina Soliz M.D.    HPI:  Sena Shepherd is a 63 y.o. old patient who comes in today for review of endocrine problems.    Patient previously followed by K[facility] last office visit was 8/2018.     1. Hyperthyroidism  History of hyperthyroidism: She has been off methimazole since 2017.    Previous labs showed normal TSH and free T4.  She is not currently on any medication.  She is without symptoms of palpitations shaking, tremors and or diarrhea.       Ref. Range 12/27/2017 08:39 3/19/2018 09:57 7/31/2018 09:09 2/1/2019 11:21   TSH Latest Ref Range: 0.450 - 4.500 uIU/mL 0.671 0.800 1.050 1.870   Free T-4 Latest Ref Range: 0.82 - 1.77 ng/dL   0.78 1.08       2. Elevated glucose  Patient has a history of elevated fasting blood glucose.  According to the patient she has difficulty maintaining a diet as she does enjoy rice with milk she does not add any sugar.  She also likes her sweets with regards to candy bars.  She does not follow any formal exercise plan.  She does not have any family history of diabetes.      Ref. Range 2/1/2019 11:21   Sodium Latest Ref Range: 134 - 144 mmol/L 141   Potassium Latest Ref Range: 3.5 - 5.2 mmol/L 5.1   Chloride Latest Ref Range: 96 - 106 mmol/L 104   Co2 Latest Ref Range: 20 - 29 mmol/L 23   Glucose Latest Ref Range: 65 - 99 mg/dL 98   Bun Latest Ref Range: 8 - 27 mg/dL 13   Creatinine Latest Ref Range: 0.57 - 1.00 mg/dL 0.98   GFR If  Latest Ref Range: >59 mL/min/1.73 72   GFR If Non  Latest Ref Range: >59 mL/min/1.73 62   Bun-Creatinine Ratio Latest Ref Range: 12 - 28  13   Calcium Latest Ref Range: 8.7 - 10.3 mg/dL 9.7     3/26/2019 A1c 5.7    3. Hyperlipidemia, unspecified hyperlipidemia type  Patient has a history per chart No recent labs   She does not take anything for her cholesterol.   Patient does not watch her diet.   She does not exercise.       4. Vitamin D deficiency  Patient is  not on Vitamin D   She believes she was taking Vitamin D supplement at sometime but not over the last several months     5. Multiple thyroid nodules: Thyroid ultrasound in 2015 showed subcentimeter thyroid nodule.  ultrasound in 2018 showed slight increase in the cystic nodule and the isthmus which measures 8.6 mm, and she also has a 6 mm cystic nodule in the right thyroid lobe.     Patient  denies symptoms of sensation of a throat mass, voice changes, hoarseness, neck pain, or difficulty swallowing, dysphonia, cough, enlarged cervical lymph nodes, sweating, tremors, heat intolerance or weight loss/gain.     Patient denies any family hx of thyroid cancer.     ROS:  Constitutional: No weight loss or gain,  fever  HEENT: No difficulty with swallowing, change in voice, or swelling in throat area   Cardiac: No chest pain, palpitations, or racing heart  Resp: No shortness of breath  GI: No abdominal pain, nausea, vomiting, or diarrhea   Neuro: No numbness or tinging in feet  Endo: No heat or cold intolerance, no polyuria or polydipsia      Past Medical History:  Patient Active Problem List    Diagnosis Date Noted   • Severe anxiety with panic 09/27/2017   • Moderate episode of recurrent major depressive disorder (HCC) 09/27/2017   • Elevated glucose 11/13/2015   • Goiter 11/13/2015   • Hyperlipidemia 09/08/2015   • Sore throat 10/23/2014   • Vitamin D deficiency 07/13/2014   • Chest pain 07/09/2014   • Hyperthyroidism 10/04/2012   • Depression with somatization 11/09/2009       Medications:    Current Outpatient Prescriptions:   •  mirtazapine (REMERON) 15 MG Tab, Take 15 mg by mouth every evening., Disp: , Rfl:   •  PARoxetine (PAXIL) 20 MG Tab, Take 20 mg by mouth every day., Disp: , Rfl:   •  omeprazole (PRILOSEC) 20 MG delayed-release capsule, Take 20 mg by mouth every day., Disp: , Rfl:   •  lamotrigine (LAMICTAL) 25 MG Tab, Take 25 mg by mouth every day., Disp: , Rfl:   •  fexofenadine (ALLEGRA) 30 MG tablet, Take  "1 Tab by mouth 2 times a day., Disp: 10 Tab, Rfl: 0  •  Doxepin HCl (SILENOR) 3 MG Tab, Take  by mouth., Disp: , Rfl:   •  atenolol (TENORMIN) 25 MG Tab, Take 25 mg by mouth every day., Disp: , Rfl:     Labs: Reviewed as above. Reviewed previous labs and US    Physical Examination:  Vital signs: /72 (BP Location: Left arm, Patient Position: Sitting, BP Cuff Size: Adult)   Pulse 92   Resp 16   Ht 1.6 m (5' 3\")   Wt 73 kg (161 lb)   SpO2 93%   BMI 28.52 kg/m²  Body mass index is 28.52 kg/m².  General: No apparent distress, cooperative, nice and talkative  Eyes: No scleral icterus, no discharge, normal eyelids, neg exopthalmus  Neck: No abnormal masses on inspection, diffuse thyroid enlargement   Resp: Normal effort, clear to auscultation bilaterally  CVS: Regular rate and rhythm, S1 S2 normal, no murmur  Extremities: No lower extremity edema  Abdomen: abdominal obesity present, nontender,   Musculoskeletal: Normal digits and nails  Skin: No rash on visible skin  Psych: Alert and oriented, normal mood and affect, intact memory and able to make informed decisions.    Assessment and Plan:    1. Hyperthyroidism  Stable currently in remission we will continue to monitor thyroid labs  - FREE THYROXINE; Future  - TSH; Future  - US-SOFT TISSUES OF HEAD - NECK; Future    2. Elevated glucose-stable  A1c today was 5.7 discussed diet and lifestyle changes with the patient.  Patient was given nutritional information.  - HEMOGLOBIN A1C; Future  - Comp Metabolic Panel; Future    3. Hyperlipidemia, unspecified hyperlipidemia type  Patient has a diagnosis of hyperlipidemia however I do not see any recent lipid profile.  Secondary to her impaired fasting glucose and obesity will recheck lipids as a risk factor and association with hyperthyroidism.  - Lipid Profile; Future    4. Vitamin D deficiency  Stable but no recent labs.  Patient is currently not on replacement.  We will recheck labs for the future to rule out vitamin " D deficiency  - VITAMIN D,25 HYDROXY; Future    5. Fatigue, unspecified type  Rule out vitamin B12 and vitamin D deficiency secondary to fatigue  - VITAMIN B12; Future  - VITAMIN D,25 HYDROXY; Future    6. Multiple thyroid nodules  Will recheck thyroid ultrasound 7/2019 for stability.  Patient is currently stable and is without any compressive symptoms.  - US-SOFT TISSUES OF HEAD - NECK; Future      Return in about 3 months (around 6/27/2019).    Thank you for allowing me to participate in the care of this patient.  If you have any questions or concerns please do not hesitate to contact me.    Irma Aguilar P.A.-C.    CC:   Mina Soliz M.D.    This note was created using voice recognition software (Dragon). The accuracy of the dictation is limited by the abilities of the software. I have reviewed the note prior to signing, however some errors in grammar and context are still possible. If you have any questions related to this note please do not hesitate to contact our office.

## 2019-03-29 ENCOUNTER — NON-PROVIDER VISIT (OUTPATIENT)
Dept: OCCUPATIONAL MEDICINE | Facility: CLINIC | Age: 64
End: 2019-03-29

## 2019-03-29 DIAGNOSIS — Z11.1 ENCOUNTER FOR PPD TEST: ICD-10-CM

## 2019-03-29 PROCEDURE — 86580 TB INTRADERMAL TEST: CPT | Performed by: PREVENTIVE MEDICINE

## 2019-04-01 ENCOUNTER — NON-PROVIDER VISIT (OUTPATIENT)
Dept: OCCUPATIONAL MEDICINE | Facility: CLINIC | Age: 64
End: 2019-04-01

## 2019-04-01 DIAGNOSIS — Z11.1 ENCOUNTER FOR PPD SKIN TEST READING: ICD-10-CM

## 2019-04-01 LAB — TB WHEAL 3D P 5 TU DIAM: NORMAL MM

## 2019-04-02 ENCOUNTER — OFFICE VISIT (OUTPATIENT)
Dept: URGENT CARE | Facility: CLINIC | Age: 64
End: 2019-04-02
Payer: MEDICARE

## 2019-04-02 ENCOUNTER — APPOINTMENT (OUTPATIENT)
Dept: RADIOLOGY | Facility: IMAGING CENTER | Age: 64
End: 2019-04-02
Attending: PHYSICIAN ASSISTANT
Payer: MEDICARE

## 2019-04-02 VITALS
SYSTOLIC BLOOD PRESSURE: 124 MMHG | WEIGHT: 161 LBS | DIASTOLIC BLOOD PRESSURE: 66 MMHG | HEIGHT: 63 IN | OXYGEN SATURATION: 95 % | TEMPERATURE: 98.3 F | RESPIRATION RATE: 16 BRPM | BODY MASS INDEX: 28.53 KG/M2 | HEART RATE: 78 BPM

## 2019-04-02 DIAGNOSIS — J01.40 ACUTE PANSINUSITIS, RECURRENCE NOT SPECIFIED: ICD-10-CM

## 2019-04-02 DIAGNOSIS — J22 LRTI (LOWER RESPIRATORY TRACT INFECTION): ICD-10-CM

## 2019-04-02 DIAGNOSIS — R05.9 COUGH: ICD-10-CM

## 2019-04-02 PROCEDURE — 99214 OFFICE O/P EST MOD 30 MIN: CPT | Performed by: PHYSICIAN ASSISTANT

## 2019-04-02 PROCEDURE — 71046 X-RAY EXAM CHEST 2 VIEWS: CPT | Mod: TC | Performed by: PHYSICIAN ASSISTANT

## 2019-04-02 RX ORDER — DOXYCYCLINE HYCLATE 100 MG
100 TABLET ORAL 2 TIMES DAILY
Qty: 20 TAB | Refills: 0 | Status: SHIPPED | OUTPATIENT
Start: 2019-04-02 | End: 2019-04-12

## 2019-04-02 RX ORDER — BENZONATATE 100 MG/1
200 CAPSULE ORAL 3 TIMES DAILY PRN
Qty: 60 CAP | Refills: 0 | Status: SHIPPED | OUTPATIENT
Start: 2019-04-02 | End: 2022-07-17

## 2019-04-02 RX ORDER — BENZONATATE 100 MG/1
200 CAPSULE ORAL 3 TIMES DAILY PRN
Qty: 60 CAP | Refills: 0 | Status: SHIPPED | OUTPATIENT
Start: 2019-04-02 | End: 2019-04-02 | Stop reason: SDUPTHER

## 2019-04-02 RX ORDER — DOXYCYCLINE HYCLATE 100 MG
100 TABLET ORAL 2 TIMES DAILY
Qty: 20 TAB | Refills: 0 | Status: SHIPPED | OUTPATIENT
Start: 2019-04-02 | End: 2019-04-02 | Stop reason: SDUPTHER

## 2019-04-02 ASSESSMENT — ENCOUNTER SYMPTOMS
SINUS PAIN: 1
SORE THROAT: 1
HEADACHES: 1
SPUTUM PRODUCTION: 1
WHEEZING: 0
SHORTNESS OF BREATH: 0
MYALGIAS: 1
CHILLS: 1
COUGH: 1
FEVER: 1

## 2019-04-02 NOTE — PROGRESS NOTES
"Subjective:   Sena Shepherd is a 63 y.o. female who presents for Sore Throat (x1 month, sore throat, productive cough, chest congestion, bodyaches, fever chills,)    This is a new problem.  Patient presents to urgent care with 1 month history of sore throat, congestion and cough.  Over the past few days she has been experiencing increasing cough with chest congestion cough productive of yellow-green sputum as well as fever, chills and generalized body aches.  The patient has been taking over-the-counter cough and cold medication with no real improvement in symptoms.    Patient is a non-smoker.        HPI  Review of Systems   Constitutional: Positive for chills, fever and malaise/fatigue.   HENT: Positive for congestion, sinus pain and sore throat. Negative for ear pain.    Respiratory: Positive for cough and sputum production. Negative for shortness of breath and wheezing.    Cardiovascular: Negative for chest pain.   Musculoskeletal: Positive for myalgias.   Neurological: Positive for headaches.   All other systems reviewed and are negative.    Allergies   Allergen Reactions   • Nkda [No Known Drug Allergy]         Objective:   /66   Pulse 78   Temp 36.8 °C (98.3 °F) (Temporal)   Resp 16   Ht 1.6 m (5' 3\")   Wt 73 kg (161 lb)   SpO2 90%   BMI 28.52 kg/m²   Physical Exam   Constitutional: She is oriented to person, place, and time. She appears well-developed and well-nourished. She does not appear ill. No distress.   HENT:   Head: Normocephalic and atraumatic.   Right Ear: Tympanic membrane, external ear and ear canal normal.   Left Ear: Tympanic membrane, external ear and ear canal normal.   Nose: Mucosal edema present. No rhinorrhea. Right sinus exhibits maxillary sinus tenderness and frontal sinus tenderness. Left sinus exhibits maxillary sinus tenderness and frontal sinus tenderness.   Mouth/Throat: Uvula is midline and mucous membranes are normal. Posterior oropharyngeal erythema " present. No oropharyngeal exudate. Tonsils are 1+ on the right. Tonsils are 1+ on the left. No tonsillar exudate.   Purulent postnasal drip noted   Eyes: Pupils are equal, round, and reactive to light. Conjunctivae and EOM are normal.   Neck: Normal range of motion. Neck supple.   Cardiovascular: Normal rate, regular rhythm and normal heart sounds.  Exam reveals no gallop and no friction rub.    No murmur heard.  Pulmonary/Chest: Effort normal. No respiratory distress. She has no decreased breath sounds. She has no wheezes. She has rhonchi. She has no rales.   Coarse breath sounds with scattered rhonchi throughout.  No rales or wheezing noted.  Breath sounds equal and unlabored.   Abdominal: Soft. Bowel sounds are normal. She exhibits no distension and no mass. There is no tenderness. There is no rebound and no guarding.   Musculoskeletal: Normal range of motion. She exhibits no edema, tenderness or deformity.   Lymphadenopathy:        Head (right side): No submental, no submandibular and no tonsillar adenopathy present.        Head (left side): No submental, no submandibular and no tonsillar adenopathy present.     She has no cervical adenopathy.        Right: No supraclavicular adenopathy present.        Left: No supraclavicular adenopathy present.   Neurological: She is alert and oriented to person, place, and time. She has normal strength. No cranial nerve deficit or sensory deficit. Coordination normal.   Skin: Skin is warm and dry. No rash noted.   Psychiatric: She has a normal mood and affect. Judgment normal.   Vitals reviewed.          Assessment/Plan:   Assessment    1. LRTI (lower respiratory tract infection)  - doxycycline (VIBRAMYCIN) 100 MG Tab; Take 1 Tab by mouth 2 times a day for 10 days.  Dispense: 20 Tab; Refill: 0    2. Cough  - DX-CHEST-2 VIEWS; Future  - benzonatate (TESSALON) 100 MG Cap; Take 2 Caps by mouth 3 times a day as needed.  Dispense: 60 Cap; Refill: 0    3. Acute pansinusitis,  recurrence not specified  - doxycycline (VIBRAMYCIN) 100 MG Tab; Take 1 Tab by mouth 2 times a day for 10 days.  Dispense: 20 Tab; Refill: 0      Initial oxygen level is noted to be low at 90%.  However, the patient does have an very thick nail polish.  Repeat O2 sat is obtained using the back of her hand with improved numbers at 95%.  Given the protracted course of her illness chest x-ray is obtained.  Chest x-ray read by the radiologist and reviewed by myself is without acute cardiopulmonary process.  Patient will be treated with doxycycline for lower respiratory tract infection and sinusitis.  She is also given Tessalon Perles as needed for cough.  Recommend nasal saline rinse and over-the-counter Flonase nasal spray.  Red flag warning symptoms and strict ER/follow-up precautions given.        Differential diagnosis, natural history, supportive care, and indications for immediate follow-up discussed.    If not improving in 3-5 days, F/U with PCP or return to  or sooner if worsens    Please note that this note was created using voice recognition speech to text software. Every effort has been made to correct obvious errors.  However, I expect there are errors of grammar and possibly context that were not discovered prior to finalizing the note

## 2019-04-02 NOTE — PATIENT INSTRUCTIONS
Sinusitis, Adult  Sinusitis is soreness and inflammation of your sinuses. Sinuses are hollow spaces in the bones around your face. They are located:  · Around your eyes.  · In the middle of your forehead.  · Behind your nose.  · In your cheekbones.  Your sinuses and nasal passages are lined with a stringy fluid (mucus). Mucus normally drains out of your sinuses. When your nasal tissues get inflamed or swollen, the mucus can get trapped or blocked so air cannot flow through your sinuses. This lets bacteria, viruses, and funguses grow, and that leads to infection.  Follow these instructions at home:  Medicines  · Take, use, or apply over-the-counter and prescription medicines only as told by your doctor. These may include nasal sprays.  · If you were prescribed an antibiotic medicine, take it as told by your doctor. Do not stop taking the antibiotic even if you start to feel better.  Hydrate and Humidify  · Drink enough water to keep your pee (urine) clear or pale yellow.  · Use a cool mist humidifier to keep the humidity level in your home above 50%.  · Breathe in steam for 10-15 minutes, 3-4 times a day or as told by your doctor. You can do this in the bathroom while a hot shower is running.  · Try not to spend time in cool or dry air.  Rest  · Rest as much as possible.  · Sleep with your head raised (elevated).  · Make sure to get enough sleep each night.  General instructions  · Put a warm, moist washcloth on your face 3-4 times a day or as told by your doctor. This will help with discomfort.  · Wash your hands often with soap and water. If there is no soap and water, use hand .  · Do not smoke. Avoid being around people who are smoking (secondhand smoke).  · Keep all follow-up visits as told by your doctor. This is important.  Contact a doctor if:  · You have a fever.  · Your symptoms get worse.  · Your symptoms do not get better within 10 days.  Get help right away if:  · You have a very bad  headache.  · You cannot stop throwing up (vomiting).  · You have pain or swelling around your face or eyes.  · You have trouble seeing.  · You feel confused.  · Your neck is stiff.  · You have trouble breathing.  This information is not intended to replace advice given to you by your health care provider. Make sure you discuss any questions you have with your health care provider.  Document Released: 06/05/2009 Document Revised: 08/13/2017 Document Reviewed: 10/12/2016  [employer] Interactive Patient Education © 2017 [employer]  Bronchitis  Bronchitis is the body's way of reacting to injury and/or infection (inflammation) of the bronchi. Bronchi are the air tubes that extend from the windpipe into the lungs. If the inflammation becomes severe, it may cause shortness of breath.  CAUSES   Inflammation may be caused by:  · A virus.  · Germs (bacteria).  · Dust.  · Allergens.  · Pollutants and many other irritants.  The cells lining the bronchial tree are covered with tiny hairs (cilia). These constantly beat upward, away from the lungs, toward the mouth. This keeps the lungs free of pollutants. When these cells become too irritated and are unable to do their job, mucus begins to develop. This causes the characteristic cough of bronchitis. The cough clears the lungs when the cilia are unable to do their job. Without either of these protective mechanisms, the mucus would settle in the lungs. Then you would develop pneumonia.  Smoking is a common cause of bronchitis and can contribute to pneumonia. Stopping this habit is the single most important thing you can do to help yourself.  TREATMENT   · Your caregiver may prescribe an antibiotic if the cough is caused by bacteria. Also, medicines that open up your airways make it easier to breathe. Your caregiver may also recommend or prescribe an expectorant. It will loosen the mucus to be coughed up. Only take over-the-counter or prescription medicines for pain, discomfort, or  fever as directed by your caregiver.  · Removing whatever causes the problem (smoking, for example) is critical to preventing the problem from getting worse.  · Cough suppressants may be prescribed for relief of cough symptoms.  · Inhaled medicines may be prescribed to help with symptoms now and to help prevent problems from returning.  · For those with recurrent (chronic) bronchitis, there may be a need for steroid medicines.  SEEK IMMEDIATE MEDICAL CARE IF:   · During treatment, you develop more pus-like mucus (purulent sputum).  · You have a fever.  · Your baby is older than 3 months with a rectal temperature of 102° F (38.9° C) or higher.  · Your baby is 3 months old or younger with a rectal temperature of 100.4° F (38° C) or higher.  · You become progressively more ill.  · You have increased difficulty breathing, wheezing, or shortness of breath.  It is necessary to seek immediate medical care if you are elderly or sick from any other disease.  MAKE SURE YOU:   · Understand these instructions.  · Will watch your condition.  · Will get help right away if you are not doing well or get worse.  Document Released: 12/18/2006 Document Revised: 03/11/2013 Document Reviewed: 10/27/2009  [email or web address]® Patient Information ©2014 [employer].

## 2019-04-22 ENCOUNTER — OFFICE VISIT (OUTPATIENT)
Dept: URGENT CARE | Facility: CLINIC | Age: 64
End: 2019-04-22
Payer: MEDICARE

## 2019-04-22 VITALS
HEIGHT: 63 IN | OXYGEN SATURATION: 94 % | HEART RATE: 66 BPM | RESPIRATION RATE: 16 BRPM | SYSTOLIC BLOOD PRESSURE: 124 MMHG | WEIGHT: 161 LBS | BODY MASS INDEX: 28.53 KG/M2 | DIASTOLIC BLOOD PRESSURE: 72 MMHG | TEMPERATURE: 98.8 F

## 2019-04-22 DIAGNOSIS — J02.9 PHARYNGITIS, UNSPECIFIED ETIOLOGY: Primary | ICD-10-CM

## 2019-04-22 PROCEDURE — 99214 OFFICE O/P EST MOD 30 MIN: CPT | Performed by: PHYSICIAN ASSISTANT

## 2019-04-22 RX ORDER — AMOXICILLIN 500 MG/1
500 CAPSULE ORAL 2 TIMES DAILY
Qty: 20 CAP | Refills: 0 | Status: SHIPPED | OUTPATIENT
Start: 2019-04-22 | End: 2019-05-02

## 2019-04-22 ASSESSMENT — ENCOUNTER SYMPTOMS
FEVER: 0
WHEEZING: 0
SHORTNESS OF BREATH: 0
CONSTIPATION: 0
SPUTUM PRODUCTION: 0
CHILLS: 0
DIARRHEA: 0
NAUSEA: 0
SORE THROAT: 1
COUGH: 0
VOMITING: 0
ABDOMINAL PAIN: 0

## 2019-04-23 NOTE — PROGRESS NOTES
Subjective:   Sena Shepherd is a 64 y.o. female who presents for Other (patient lost prescription for LRTI that was given to her last visit and needs more )        HPI     The pt was seen on 4/2/19 for LRTI and tx for doxycyline and benzonatate. The pt lost her previous rx for doxycycline after taking 4 days of medication. Her sx resolved completely.     Pt present today with sore throat x 2 weeks. Sinus pain and ear pain. No cough, congestions. No nausea, vomiting, diarrhea. No ill contacts. Pt received flu shot. No SOB, wheezing.    Review of Systems   Constitutional: Negative for chills, fever and malaise/fatigue.   HENT: Positive for congestion, ear pain and sore throat.    Respiratory: Negative for cough, sputum production, shortness of breath and wheezing.    Gastrointestinal: Negative for abdominal pain, constipation, diarrhea, nausea and vomiting.   All other systems reviewed and are negative.      PMH:  has a past medical history of Anxiety; Arthralgia; Depression; GERD (gastroesophageal reflux disease); History of psychiatric symptoms; Hyperlipidemia; Hypertension; Hypothyroid; Menopause; Panic disorder; and Vitamin d deficiency. She also has no past medical history of Alcohol abuse; Alcoholism ([facility]); or Substance abuse ([facility]).  MEDS:   Current Outpatient Prescriptions:   •  amoxicillin (AMOXIL) 500 MG Cap, Take 1 Cap by mouth 2 times a day for 10 days., Disp: 20 Cap, Rfl: 0  •  benzonatate (TESSALON) 100 MG Cap, Take 2 Caps by mouth 3 times a day as needed., Disp: 60 Cap, Rfl: 0  •  mirtazapine (REMERON) 15 MG Tab, Take 15 mg by mouth every evening., Disp: , Rfl:   •  PARoxetine (PAXIL) 20 MG Tab, Take 20 mg by mouth every day., Disp: , Rfl:   •  Doxepin HCl (SILENOR) 3 MG Tab, Take  by mouth., Disp: , Rfl:   •  omeprazole (PRILOSEC) 20 MG delayed-release capsule, Take 20 mg by mouth every day., Disp: , Rfl:   •  lamotrigine (LAMICTAL) 25 MG Tab, Take 25 mg by mouth every day., Disp: , Rfl:  "  •  atenolol (TENORMIN) 25 MG Tab, Take 25 mg by mouth every day., Disp: , Rfl:   •  fexofenadine (ALLEGRA) 30 MG tablet, Take 1 Tab by mouth 2 times a day., Disp: 10 Tab, Rfl: 0  ALLERGIES:   Allergies   Allergen Reactions   • Nkda [No Known Drug Allergy]      SURGHX:   Past Surgical History:   Procedure Laterality Date   • CHOLECYSTECTOMY  2/2016   • CHOLECYSTECTOMY       SOCHX:  reports that she has never smoked. She has never used smokeless tobacco. She reports that she does not drink alcohol or use drugs.  History reviewed. No pertinent family history.     Objective:   /72 (BP Location: Right arm, Patient Position: Sitting)   Pulse 66   Temp 37.1 °C (98.8 °F) (Temporal)   Resp 16   Ht 1.6 m (5' 3\")   Wt 73 kg (161 lb)   SpO2 94%   BMI 28.52 kg/m²     Physical Exam   Constitutional: She is oriented to person, place, and time. She appears well-developed and well-nourished. No distress.   HENT:   Head: Normocephalic and atraumatic.   Right Ear: Tympanic membrane and ear canal normal.   Left Ear: Ear canal normal. Tympanic membrane is erythematous.   Nose: Mucosal edema present. No sinus tenderness.   Mouth/Throat: Uvula is midline. Posterior oropharyngeal erythema present.   Eyes: Pupils are equal, round, and reactive to light. Conjunctivae are normal.   Neck: Normal range of motion. Neck supple. No tracheal deviation present.   Cardiovascular: Normal rate and regular rhythm.    Pulmonary/Chest: Effort normal and breath sounds normal. No respiratory distress. She has no wheezes. She has no rales.   Lymphadenopathy:     She has cervical adenopathy.   Neurological: She is alert and oriented to person, place, and time.   Skin: Skin is warm and dry. Capillary refill takes less than 2 seconds.   Psychiatric: She has a normal mood and affect. Her behavior is normal.   Vitals reviewed.        Assessment/Plan:     1. Pharyngitis, unspecified etiology  amoxicillin (AMOXIL) 500 MG Cap     Supportive care " reviewed including: warm salt water gargles. Pharyngitis educational handout given to patient. Infection control and and hand hygiene reviewed.     Follow-up with primary care provider.  If symptoms worsen or persist patient can return to clinic for reevaluation.  Red flags and emergency room precautions discussed. All side effects of medication discussed including allergic response, GI upset, tendon injury, etc. Patient verbalized understanding of information.    Please note that this dictation was created using voice recognition software. I have made every reasonable attempt to correct obvious errors, but I expect that there are errors of grammar and possibly content that I did not discover before finalizing the note.

## 2019-04-23 NOTE — PATIENT INSTRUCTIONS
Pharyngitis  Pharyngitis is a sore throat (pharynx). There is redness, pain, and swelling of your throat.  Follow these instructions at home:  · Drink enough fluids to keep your pee (urine) clear or pale yellow.  · Only take medicine as told by your doctor.  ¨ You may get sick again if you do not take medicine as told. Finish your medicines, even if you start to feel better.  ¨ Do not take aspirin.  · Rest.  · Rinse your mouth (gargle) with salt water (½ tsp of salt per 1 qt of water) every 1-2 hours. This will help the pain.  · If you are not at risk for choking, you can suck on hard candy or sore throat lozenges.  Contact a doctor if:  · You have large, tender lumps on your neck.  · You have a rash.  · You cough up green, yellow-brown, or bloody spit.  Get help right away if:  · You have a stiff neck.  · You drool or cannot swallow liquids.  · You throw up (vomit) or are not able to keep medicine or liquids down.  · You have very bad pain that does not go away with medicine.  · You have problems breathing (not from a stuffy nose).  This information is not intended to replace advice given to you by your health care provider. Make sure you discuss any questions you have with your health care provider.  Document Released: 06/05/2009 Document Revised: 05/25/2017 Document Reviewed: 08/25/2014  [employer] Interactive Patient Education © 2017 [employer]

## 2019-05-02 ENCOUNTER — OFFICE VISIT (OUTPATIENT)
Dept: URGENT CARE | Facility: CLINIC | Age: 64
End: 2019-05-02
Payer: MEDICARE

## 2019-05-02 ENCOUNTER — TELEPHONE (OUTPATIENT)
Dept: ENDOCRINOLOGY | Facility: MEDICAL CENTER | Age: 64
End: 2019-05-02

## 2019-05-02 VITALS
OXYGEN SATURATION: 94 % | HEART RATE: 73 BPM | BODY MASS INDEX: 28.35 KG/M2 | HEIGHT: 63 IN | TEMPERATURE: 98 F | RESPIRATION RATE: 16 BRPM | WEIGHT: 160 LBS | SYSTOLIC BLOOD PRESSURE: 122 MMHG | DIASTOLIC BLOOD PRESSURE: 70 MMHG

## 2019-05-02 DIAGNOSIS — E05.90 SUBCLINICAL HYPERTHYROIDISM: ICD-10-CM

## 2019-05-02 DIAGNOSIS — R07.89 CHEST PRESSURE: ICD-10-CM

## 2019-05-02 PROCEDURE — 99214 OFFICE O/P EST MOD 30 MIN: CPT | Performed by: PHYSICIAN ASSISTANT

## 2019-05-02 NOTE — TELEPHONE ENCOUNTER
1. Caller Name: Marty                                             Call Back Number: [phone]     Patient approves a detailed voicemail message: N\A    Marty called for patient stating she has been having a sore throat and recently went to . They prescribed her antibiotics and does not think they are helping. Patient is requesting to be seen sooner to be evaluated and see if there is another imaging order that can be ordered other than an ultrasound. I made patient an appt for 5/6/ @ 2:40pm.

## 2019-05-02 NOTE — PROGRESS NOTES
Subjective:      Sena Shepherd is a 64 y.o. female who presents with Chest Pain (left shoulder pain started 2 weeks ago, sore throat, sinus pain, ear clogged, chills.)            Patient presents with several different symptoms.  Her most pressing concern is she is having chest pressure, shortness of breath, trouble sleeping.  Patient thinks she is hyperthyroid.  She has a history of this in the past but she was taken off her medication a few months ago.  She was seen by her PCP and had lab work completed which showed her thyroid was functioning at a normal limit.    Patient also complaining of allergy type symptoms.      Chest Pain    This is a new problem. The current episode started 1 to 4 weeks ago. Pertinent negatives include no abdominal pain, cough, fever, irregular heartbeat, lower extremity edema, malaise/fatigue, palpitations or shortness of breath.       PMH:  has a past medical history of Anxiety; Arthralgia; Depression; GERD (gastroesophageal reflux disease); History of psychiatric symptoms; Hyperlipidemia; Hypertension; Hypothyroid; Menopause; Panic disorder; and Vitamin d deficiency. She also has no past medical history of Alcohol abuse; Alcoholism ([facility]); or Substance abuse ([facility]).  MEDS:   Current Outpatient Prescriptions:   •  PARoxetine (PAXIL) 20 MG Tab, Take 20 mg by mouth every day., Disp: , Rfl:   •  lamotrigine (LAMICTAL) 25 MG Tab, Take 25 mg by mouth every day., Disp: , Rfl:   •  benzonatate (TESSALON) 100 MG Cap, Take 2 Caps by mouth 3 times a day as needed., Disp: 60 Cap, Rfl: 0  •  mirtazapine (REMERON) 15 MG Tab, Take 15 mg by mouth every evening., Disp: , Rfl:   •  Doxepin HCl (SILENOR) 3 MG Tab, Take  by mouth., Disp: , Rfl:   •  omeprazole (PRILOSEC) 20 MG delayed-release capsule, Take 20 mg by mouth every day., Disp: , Rfl:   •  atenolol (TENORMIN) 25 MG Tab, Take 25 mg by mouth every day., Disp: , Rfl:   •  fexofenadine (ALLEGRA) 30 MG tablet, Take 1 Tab by mouth 2  "times a day., Disp: 10 Tab, Rfl: 0  ALLERGIES:   Allergies   Allergen Reactions   • Nkda [No Known Drug Allergy]      SURGHX:   Past Surgical History:   Procedure Laterality Date   • CHOLECYSTECTOMY  2/2016   • CHOLECYSTECTOMY       SOCHX:  reports that she has never smoked. She has never used smokeless tobacco. She reports that she does not drink alcohol or use drugs.  FH: family history is not on file.    Review of Systems   Constitutional: Negative.  Negative for fever and malaise/fatigue.   HENT: Positive for congestion. Negative for ear pain and sore throat.    Respiratory: Negative.  Negative for cough and shortness of breath.    Cardiovascular: Positive for chest pain. Negative for palpitations and leg swelling.   Gastrointestinal: Negative.  Negative for abdominal pain.   Neurological: Negative.        Medications, Allergies, and current problem list reviewed today in Epic     Objective:     /70   Pulse 73   Temp 36.7 °C (98 °F) (Temporal)   Resp 16   Ht 1.6 m (5' 3\")   Wt 72.6 kg (160 lb)   SpO2 94%   BMI 28.34 kg/m²      Physical Exam   Constitutional: She is oriented to person, place, and time. She appears well-developed and well-nourished. No distress.   HENT:   Head: Normocephalic and atraumatic.   Right Ear: External ear normal.   Left Ear: External ear normal.   Mouth/Throat: Oropharynx is clear and moist.   Eyes: Pupils are equal, round, and reactive to light. Conjunctivae and EOM are normal.   Neck: Normal range of motion. Neck supple. No thyromegaly present.   Cardiovascular: Normal rate, regular rhythm and normal heart sounds.    Pulmonary/Chest: Effort normal and breath sounds normal. No respiratory distress. She has no wheezes. She has no rales.   Abdominal: Soft. She exhibits no distension. There is no tenderness. There is no guarding.   Neurological: She is alert and oriented to person, place, and time.   Skin: Skin is warm and dry. She is not diaphoretic.   Psychiatric: She has a " normal mood and affect. Her behavior is normal. Judgment and thought content normal.   Nursing note and vitals reviewed.              Assessment/Plan:     1. Chest pressure       EKG: Normal sinus rhythm, rate 62, no signs of ST elevation or ischemia    Exam unremarkable, vital signs normal, EKG normal.  Patient is adamant that she believes it is her thyroid.  I have reviewed her chart and all of her labs.  She was taken off her thyroid medication by her endocrinologist.  I have form her that I would not restart her medication.  This has to be done by her endocrinologist.  She continues to press the issue.  Eventually we reached impasse, I informed her that I cannot help her today in the urgent care.  From a clinical standpoint she has no acute findings on her exam, her EKG is normal, her vitals are normal.  She needs to call and follow-up with her endocrinologist.  She states she has tried but is unable to reach her.  I informed her that I will not be starting her on hyperthyroid medication today.    Please note that this dictation was created using voice recognition software. I have made every reasonable attempt to correct obvious errors, but I expect that there are errors of grammar and possibly content that I did not discover before finalizing the note.

## 2019-05-03 RX ORDER — METHIMAZOLE 5 MG/1
5 TABLET ORAL DAILY
Qty: 30 TAB | Refills: 0 | OUTPATIENT
Start: 2019-05-03

## 2019-05-08 ASSESSMENT — ENCOUNTER SYMPTOMS
IRREGULAR HEARTBEAT: 0
COUGH: 0
FEVER: 0
LOWER EXTREMITY EDEMA: 0
PALPITATIONS: 0
ABDOMINAL PAIN: 0
GASTROINTESTINAL NEGATIVE: 1
SHORTNESS OF BREATH: 0
CONSTITUTIONAL NEGATIVE: 1
NEUROLOGICAL NEGATIVE: 1
SORE THROAT: 0
RESPIRATORY NEGATIVE: 1

## 2019-06-08 ENCOUNTER — OFFICE VISIT (OUTPATIENT)
Dept: URGENT CARE | Facility: PHYSICIAN GROUP | Age: 64
End: 2019-06-08
Payer: MEDICARE

## 2019-06-08 VITALS
BODY MASS INDEX: 27.6 KG/M2 | SYSTOLIC BLOOD PRESSURE: 130 MMHG | DIASTOLIC BLOOD PRESSURE: 76 MMHG | HEART RATE: 60 BPM | OXYGEN SATURATION: 95 % | TEMPERATURE: 97.5 F | HEIGHT: 62 IN | WEIGHT: 150 LBS

## 2019-06-08 DIAGNOSIS — R10.13 EPIGASTRIC PAIN: ICD-10-CM

## 2019-06-08 DIAGNOSIS — Z87.11 HISTORY OF GASTRIC ULCER: ICD-10-CM

## 2019-06-08 PROCEDURE — 99213 OFFICE O/P EST LOW 20 MIN: CPT | Performed by: PHYSICIAN ASSISTANT

## 2019-06-08 RX ORDER — LAMOTRIGINE 100 MG/1
100 TABLET ORAL
Refills: 3 | COMMUNITY
Start: 2019-03-23

## 2019-06-08 RX ORDER — OMEPRAZOLE 20 MG/1
20 CAPSULE, DELAYED RELEASE ORAL DAILY
Qty: 30 CAP | Refills: 0 | Status: SHIPPED | OUTPATIENT
Start: 2019-06-08 | End: 2022-04-17

## 2019-06-08 ASSESSMENT — ENCOUNTER SYMPTOMS
HEMATOCHEZIA: 0
CONSTIPATION: 0
FEVER: 0
NAUSEA: 0
DIARRHEA: 0
VOMITING: 0
SHORTNESS OF BREATH: 0
ANOREXIA: 0
BLOOD IN STOOL: 0
ABDOMINAL PAIN: 1
CHILLS: 0
BELCHING: 0
FLANK PAIN: 0

## 2019-06-08 NOTE — PROGRESS NOTES
Subjective:   Sena Shepherd is a 64 y.o. female who presents for Abdominal Pain (Pt complains of pain when eating. States she was Dx w/ ulcer.)       Abdominal Pain   This is a recurrent problem. The current episode started in the past 7 days. The onset quality is undetermined. The problem occurs constantly. The problem has been unchanged. The pain is located in the epigastric region. The pain is moderate. The quality of the pain is burning. The abdominal pain does not radiate. Pertinent negatives include no anorexia, belching, constipation, diarrhea, dysuria, fever, frequency, hematochezia, hematuria, melena, nausea or vomiting. The pain is aggravated by eating. The pain is relieved by nothing. Treatments tried: has used PPI in the past which has relieved previous symptoms. Her past medical history is significant for PUD.     Review of Systems   Constitutional: Negative for chills and fever.   Respiratory: Negative for shortness of breath.    Cardiovascular: Negative for chest pain.   Gastrointestinal: Positive for abdominal pain. Negative for anorexia, blood in stool, constipation, diarrhea, hematochezia, melena, nausea and vomiting.   Genitourinary: Negative for dysuria, flank pain, frequency, hematuria and urgency.   All other systems reviewed and are negative.      PMH:  has a past medical history of Anxiety; Arthralgia; Depression; GERD (gastroesophageal reflux disease); History of psychiatric symptoms; Hyperlipidemia; Hypertension; Hypothyroid; Menopause; Panic disorder; and Vitamin d deficiency. She also has no past medical history of Alcohol abuse; Alcoholism ([facility]); or Substance abuse ([facility]).    MEDS:   Current Outpatient Prescriptions:   •  lamoTRIgine (LAMICTAL) 100 MG Tab, TAKE 1/2 TABLET BY MOUTH EVERY MORNING AND TAKE 1 TABLET AT NIGHT, Disp: , Rfl: 3  •  omeprazole (PRILOSEC) 20 MG delayed-release capsule, Take 1 Cap by mouth every day., Disp: 30 Cap, Rfl: 0  •  mirtazapine (REMERON) 15  "MG Tab, Take 15 mg by mouth every evening., Disp: , Rfl:   •  PARoxetine (PAXIL) 20 MG Tab, Take 20 mg by mouth every day., Disp: , Rfl:   •  lamotrigine (LAMICTAL) 25 MG Tab, Take 25 mg by mouth every day., Disp: , Rfl:   •  fexofenadine (ALLEGRA) 30 MG tablet, Take 1 Tab by mouth 2 times a day., Disp: 10 Tab, Rfl: 0  •  benzonatate (TESSALON) 100 MG Cap, Take 2 Caps by mouth 3 times a day as needed. (Patient not taking: Reported on 6/8/2019), Disp: 60 Cap, Rfl: 0  •  Doxepin HCl (SILENOR) 3 MG Tab, Take  by mouth., Disp: , Rfl:   •  atenolol (TENORMIN) 25 MG Tab, Take 25 mg by mouth every day., Disp: , Rfl:     ALLERGIES:   Allergies   Allergen Reactions   • Nkda [No Known Drug Allergy]        SURGHX:   Past Surgical History:   Procedure Laterality Date   • CHOLECYSTECTOMY  2/2016   • CHOLECYSTECTOMY         SOCHX:  reports that she has never smoked. She has never used smokeless tobacco. She reports that she does not drink alcohol or use drugs.    FH: Reviewed with patient, not pertinent to this visit.     Objective:   /76   Pulse 60   Temp 36.4 °C (97.5 °F)   Ht 1.575 m (5' 2\")   Wt 68 kg (150 lb)   SpO2 95%   BMI 27.44 kg/m²   Physical Exam   Constitutional: She is oriented to person, place, and time. She appears well-developed and well-nourished. No distress.   HENT:   Head: Normocephalic and atraumatic.   Nose: Nose normal.   Eyes: Conjunctivae and EOM are normal.   Neck: Normal range of motion. No tracheal deviation present.   Pulmonary/Chest: Effort normal. No respiratory distress.   Abdominal: Soft. Bowel sounds are normal. She exhibits no distension and no mass. There is no hepatosplenomegaly. There is tenderness in the epigastric area. There is no rigidity, no rebound, no guarding and no CVA tenderness.   Musculoskeletal:   ROM normal all four extremities   Neurological: She is alert and oriented to person, place, and time.   Skin: Skin is warm and dry.   Psychiatric: She has a normal mood and " affect. Her behavior is normal. Judgment and thought content normal.   Vitals reviewed.      Assessment/Plan:   1. Epigastric pain  - omeprazole (PRILOSEC) 20 MG delayed-release capsule; Take 1 Cap by mouth every day.  Dispense: 30 Cap; Refill: 0  - REFERRAL TO GASTROENTEROLOGY    2. History of gastric ulcer  - REFERRAL TO GASTROENTEROLOGY    Other orders  - lamoTRIgine (LAMICTAL) 100 MG Tab; TAKE 1/2 TABLET BY MOUTH EVERY MORNING AND TAKE 1 TABLET AT NIGHT; Refill: 3    - Discussed H. Pylori testing, which patient declines at this time  - Advised to avoid aggravating foods (spicy, greasy, acidic, etc.)  - Advised to follow up with PCP/GI     Differential diagnosis, natural history, supportive care, and indications for immediate follow-up discussed.

## 2019-07-25 LAB
25(OH)D3+25(OH)D2 SERPL-MCNC: 27.5 NG/ML (ref 30–100)
CHOLEST SERPL-MCNC: 286 MG/DL (ref 100–199)
HBA1C MFR BLD: 5.9 % (ref 4.8–5.6)
HDLC SERPL-MCNC: 54 MG/DL
LABORATORY COMMENT REPORT: ABNORMAL
LDLC SERPL CALC-MCNC: 204 MG/DL (ref 0–99)
T4 FREE SERPL-MCNC: 1.04 NG/DL (ref 0.82–1.77)
TRIGL SERPL-MCNC: 142 MG/DL (ref 0–149)
TSH SERPL DL<=0.005 MIU/L-ACNC: 0.84 UIU/ML (ref 0.45–4.5)
VIT B12 SERPL-MCNC: 589 PG/ML (ref 232–1245)
VLDLC SERPL CALC-MCNC: 28 MG/DL (ref 5–40)

## 2019-10-03 ENCOUNTER — NON-PROVIDER VISIT (OUTPATIENT)
Dept: OCCUPATIONAL MEDICINE | Facility: CLINIC | Age: 64
End: 2019-10-03

## 2019-10-03 DIAGNOSIS — Z11.1 ENCOUNTER FOR PPD TEST: ICD-10-CM

## 2019-10-03 PROCEDURE — 86580 TB INTRADERMAL TEST: CPT | Performed by: NURSE PRACTITIONER

## 2019-10-06 ENCOUNTER — NON-PROVIDER VISIT (OUTPATIENT)
Dept: URGENT CARE | Facility: CLINIC | Age: 64
End: 2019-10-06

## 2019-10-06 LAB — TB WHEAL 3D P 5 TU DIAM: NORMAL MM

## 2019-10-10 ENCOUNTER — NON-PROVIDER VISIT (OUTPATIENT)
Dept: URGENT CARE | Facility: CLINIC | Age: 64
End: 2019-10-10

## 2019-10-10 DIAGNOSIS — Z11.1 PPD SCREENING TEST: Primary | ICD-10-CM

## 2019-10-10 PROCEDURE — 86580 TB INTRADERMAL TEST: CPT | Performed by: PHYSICIAN ASSISTANT

## 2019-10-13 ENCOUNTER — NON-PROVIDER VISIT (OUTPATIENT)
Dept: URGENT CARE | Facility: CLINIC | Age: 64
End: 2019-10-13

## 2019-10-13 LAB — TB WHEAL 3D P 5 TU DIAM: NORMAL MM

## 2019-10-22 ENCOUNTER — OFFICE VISIT (OUTPATIENT)
Dept: ENDOCRINOLOGY | Facility: MEDICAL CENTER | Age: 64
End: 2019-10-22
Payer: MEDICARE

## 2019-10-22 VITALS
HEIGHT: 62 IN | WEIGHT: 162 LBS | SYSTOLIC BLOOD PRESSURE: 110 MMHG | BODY MASS INDEX: 29.81 KG/M2 | DIASTOLIC BLOOD PRESSURE: 70 MMHG | HEART RATE: 60 BPM | OXYGEN SATURATION: 96 %

## 2019-10-22 DIAGNOSIS — R73.01 IMPAIRED FASTING GLUCOSE: ICD-10-CM

## 2019-10-22 DIAGNOSIS — E55.9 VITAMIN D DEFICIENCY: ICD-10-CM

## 2019-10-22 DIAGNOSIS — E04.9 GOITER: ICD-10-CM

## 2019-10-22 DIAGNOSIS — E78.2 MIXED HYPERLIPIDEMIA: ICD-10-CM

## 2019-10-22 DIAGNOSIS — E05.90 HYPERTHYROIDISM: ICD-10-CM

## 2019-10-22 PROCEDURE — 99214 OFFICE O/P EST MOD 30 MIN: CPT | Performed by: PHYSICIAN ASSISTANT

## 2019-10-23 NOTE — PROGRESS NOTES
Endocrinology Clinic Progress Note  PCP: Mina Soliz M.D.    HPI:  Sena Shepherd is a 63 y.o. old patient who comes in today for review of endocrine problems. She is accompanied by her .    Patient previously followed by Koby last office visit was 8/2018.     1. Hyperthyroidism  History of hyperthyroidism: She has been off methimazole since 2017.     Previous labs showed normal TSH and free T4.    She is not currently on any medication.    She is without symptoms of palpitations shaking, tremors and or diarrhea.  She complains of symptoms of fatigue and depression without SI/HI     Most recent labs:       Ref. Range 7/24/2019 10:59   TSH Latest Ref Range: 0.450 - 4.500 uIU/mL 0.839   Free T-4 Latest Ref Range: 0.82 - 1.77 ng/dL 1.04          Ref. Range 12/27/2017 08:39 3/19/2018 09:57 7/31/2018 09:09 2/1/2019 11:21   TSH Latest Ref Range: 0.450 - 4.500 uIU/mL 0.671 0.800 1.050 1.870   Free T-4 Latest Ref Range: 0.82 - 1.77 ng/dL   0.78 1.08       2. Elevated glucose  Patient has a history of elevated fasting blood glucose. Patient loves bread and beans.   She does not follow any formal exercise plan.    No family history of diabetes.        Ref. Range 6/11/2017 11:24 7/31/2018 09:09 2/1/2019 11:21   Glucose Latest Ref Range: 65 - 99 mg/dL 111 (H) 93 98        Ref. Range 7/24/2019 10:59   Glycohemoglobin Latest Ref Range: 4.8 - 5.6 % 5.9 (H)     3/26/2019 A1c 5.7    3. Hyperlipidemia, unspecified hyperlipidemia type   She does not take anything for her cholesterol.   Diet as per above.   She does not exercise.   Followed by her PCP        Ref. Range 7/24/2019 10:59   Cholesterol,Tot Latest Ref Range: 100 - 199 mg/dL 286 (H)   Triglycerides Latest Ref Range: 0 - 149 mg/dL 142   HDL Latest Ref Range: >39 mg/dL 54   LDL Latest Ref Range: 0 - 99 mg/dL 204 (H)   VLDL Cholesterol Calc Latest Ref Range: 5 - 40 mg/dL 28       4. Vitamin D deficiency  She believes she was taking Vitamin D  supplement      Ref. Range 7/24/2019 10:59   25-Hydroxy   Vitamin D 25 Latest Ref Range: 30.0 - 100.0 ng/mL 27.5 (L)       5. Multiple thyroid nodules:   Thyroid ultrasound in 2015 showed subcentimeter thyroid nodule.      Thyroid ultrasound in 2018 showed slight increase in the cystic nodule and the isthmus which measures 8.6 mm, and she also has a 6 mm cystic nodule in the right thyroid lobe.     Patient  denies symptoms of sensation of a throat mass, voice changes, hoarseness, neck pain, or difficulty swallowing, dysphonia, cough, enlarged cervical lymph nodes, sweating, tremors, heat intolerance or weight loss/gain.     Patient denies any family hx of thyroid cancer.     ROS:  Constitutional: No weight loss or gain,  fever  HEENT: No difficulty with swallowing, change in voice, or swelling in throat area   Cardiac: No chest pain, palpitations, or racing heart  Resp: No shortness of breath  GI: No abdominal pain, nausea, vomiting, or diarrhea   Neuro: No numbness or tinging in feet  Endo: No heat or cold intolerance, no polyuria or polydipsia      Past Medical History:  Patient Active Problem List    Diagnosis Date Noted   • Severe anxiety with panic 09/27/2017   • Moderate episode of recurrent major depressive disorder (HCC) 09/27/2017   • Elevated glucose 11/13/2015   • Goiter 11/13/2015   • Hyperlipidemia 09/08/2015   • Sore throat 10/23/2014   • Vitamin D deficiency 07/13/2014   • Chest pain 07/09/2014   • Hyperthyroidism 10/04/2012   • Depression with somatization 11/09/2009       Medications:    Current Outpatient Medications:   •  lamoTRIgine (LAMICTAL) 100 MG Tab, Take 100 mg by mouth every bedtime., Disp: , Rfl: 3  •  omeprazole (PRILOSEC) 20 MG delayed-release capsule, Take 1 Cap by mouth every day., Disp: 30 Cap, Rfl: 0  •  mirtazapine (REMERON) 15 MG Tab, Take 15 mg by mouth every evening., Disp: , Rfl:   •  PARoxetine (PAXIL) 20 MG Tab, Take 20 mg by mouth every day., Disp: , Rfl:   •  Doxepin HCl  "(SILENOR) 3 MG Tab, Take 1 Tab by mouth at bedtime as needed., Disp: , Rfl:   •  lamotrigine (LAMICTAL) 25 MG Tab, Take 50 mg by mouth every day., Disp: , Rfl:   •  benzonatate (TESSALON) 100 MG Cap, Take 2 Caps by mouth 3 times a day as needed. (Patient not taking: Reported on 6/8/2019), Disp: 60 Cap, Rfl: 0  •  atenolol (TENORMIN) 25 MG Tab, Take 25 mg by mouth every day., Disp: , Rfl:   •  fexofenadine (ALLEGRA) 30 MG tablet, Take 1 Tab by mouth 2 times a day. (Patient not taking: Reported on 10/22/2019), Disp: 10 Tab, Rfl: 0    Labs: Reviewed as above. Reviewed previous labs and US    Physical Examination:  Vital signs: /70 (BP Location: Left arm, Patient Position: Sitting, BP Cuff Size: Adult)   Pulse 60   Ht 1.575 m (5' 2\")   Wt 73.5 kg (162 lb)   SpO2 96%   BMI 29.63 kg/m²  Body mass index is 29.63 kg/m².  General: No apparent distress, cooperative, nice and talkative, good eye contact   Eyes: No scleral icterus, no discharge, normal eyelids, neg exopthalmus  Neck: No abnormal masses on inspection, diffuse thyroid enlargement nontender neg lymphadenopathy  Resp: Normal effort, clear to auscultation bilaterally  CVS: Regular rate and rhythm, S1 S2 normal, no murmur  Musculoskeletal: Normal digits and nails  Skin: No rash on visible skin  Psych: Alert and oriented, normal mood and affect, intact memory and able to make informed decisions.    Assessment and Plan:    1. Hyperthyroidism  Stable currently in remission we will continue to monitor thyroid labs      2. Elevated glucose-stable  A1c Increased at last evaluation. Again discussed diet and lifestyle changes with the patient and .     3. Hyperlipidemia, unspecified hyperlipidemia type  Discussed and reviewed most recent labs. We discussed multiple ways of treatment and management. We discussed calcium scoring and checking LDL particle size to evaluate cardiac risk factors. History of impaired fasting glucose. No FMH of sudden cardiac " death. She will do [place] genetic project today to check for FH.     4. Vitamin D deficiency  Chronic stable condition managed with current vitamin  regimen   Increase current regimen     5. Fatigue, unspecified type  Referred back to PCP for evaluation for other etiologies. Patient is expressing depression. No SI/HI. She is accompanied by her  today. Good support system.    6. Multiple thyroid nodules  Patient did not have her thyroid US after last evaluation. Discussed with patient and  previous. Will repeat 2020.     Return in about 2 months (around 12/22/2019).    Thank you for allowing me to participate in the care of this patient.  If you have any questions or concerns please do not hesitate to contact me.    Irma Aguilar P.A.-C.    CC:   Mina Soliz M.D.    This note was created using voice recognition software (Dragon). The accuracy of the dictation is limited by the abilities of the software. I have reviewed the note prior to signing, however some errors in grammar and context are still possible. If you have any questions related to this note please do not hesitate to contact our office.

## 2019-12-17 ENCOUNTER — APPOINTMENT (OUTPATIENT)
Dept: ENDOCRINOLOGY | Facility: MEDICAL CENTER | Age: 64
End: 2019-12-17
Payer: MEDICARE

## 2019-12-20 LAB
T4 FREE SERPL-MCNC: 1.03 NG/DL (ref 0.82–1.77)
TSH SERPL DL<=0.005 MIU/L-ACNC: 0.71 UIU/ML (ref 0.45–4.5)

## 2020-07-07 ENCOUNTER — APPOINTMENT (OUTPATIENT)
Dept: ENDOCRINOLOGY | Facility: MEDICAL CENTER | Age: 65
End: 2020-07-07
Attending: INTERNAL MEDICINE
Payer: MEDICARE

## 2021-03-21 ENCOUNTER — NON-PROVIDER VISIT (OUTPATIENT)
Dept: URGENT CARE | Facility: CLINIC | Age: 66
End: 2021-03-21

## 2021-03-21 DIAGNOSIS — Z11.1 ENCOUNTER FOR PPD TEST: ICD-10-CM

## 2021-03-21 PROCEDURE — 86580 TB INTRADERMAL TEST: CPT | Performed by: PHYSICIAN ASSISTANT

## 2021-03-23 ENCOUNTER — NON-PROVIDER VISIT (OUTPATIENT)
Dept: OCCUPATIONAL MEDICINE | Facility: CLINIC | Age: 66
End: 2021-03-23

## 2021-03-23 LAB — TB WHEAL 3D P 5 TU DIAM: NORMAL MM

## 2021-05-01 ENCOUNTER — HOSPITAL ENCOUNTER (EMERGENCY)
Dept: HOSPITAL 8 - ED | Age: 66
LOS: 1 days | Discharge: HOME | End: 2021-05-02
Payer: MEDICARE

## 2021-05-01 VITALS — WEIGHT: 154.54 LBS | BODY MASS INDEX: 27.38 KG/M2 | HEIGHT: 63 IN

## 2021-05-01 DIAGNOSIS — R50.9: ICD-10-CM

## 2021-05-01 DIAGNOSIS — R07.89: ICD-10-CM

## 2021-05-01 DIAGNOSIS — B34.9: ICD-10-CM

## 2021-05-01 DIAGNOSIS — U07.1: Primary | ICD-10-CM

## 2021-05-01 DIAGNOSIS — Z90.49: ICD-10-CM

## 2021-05-01 LAB
<PLATELET ESTIMATE>: ADEQUATE
<PLT MORPHOLOGY>: (no result)
ALBUMIN SERPL-MCNC: 4.2 G/DL (ref 3.4–5)
ALP SERPL-CCNC: 62 U/L (ref 45–117)
ALT SERPL-CCNC: 24 U/L (ref 12–78)
ANION GAP SERPL CALC-SCNC: 5 MMOL/L (ref 5–15)
BAND#(MANUAL): 0.22 X10^3/UL
BILIRUB DIRECT SERPL-MCNC: NORMAL MG/DL
BILIRUB SERPL-MCNC: 0.5 MG/DL (ref 0.2–1)
CALCIUM SERPL-MCNC: 8.2 MG/DL (ref 8.5–10.1)
CHLORIDE SERPL-SCNC: 110 MMOL/L (ref 98–107)
CREAT SERPL-MCNC: 0.97 MG/DL (ref 0.55–1.02)
EOS#(MANUAL): 0.03 X10^3/UL (ref 0–0.4)
EOS% (MANUAL): 1 % (ref 1–7)
ERYTHROCYTE [DISTWIDTH] IN BLOOD BY AUTOMATED COUNT: 14 % (ref 9.6–15.2)
LYMPH#(MANUAL): 1.34 X10^3/UL (ref 1–3.4)
LYMPHS% (MANUAL): 42 % (ref 22–44)
MCH RBC QN AUTO: 29.4 PG (ref 27–34.8)
MCHC RBC AUTO-ENTMCNC: 33.5 G/DL (ref 32.4–35.8)
MD: YES
MONOS#(MANUAL): 0.26 X10^3/UL (ref 0.3–2.7)
MONOS% (MANUAL): 8 % (ref 2–9)
NEUTS BAND NFR BLD: 7 % (ref 0–7)
PLATELET # BLD AUTO: 207 X10^3/UL (ref 130–400)
PMV BLD AUTO: 8.1 FL (ref 7.4–10.4)
PROT SERPL-MCNC: 7.4 G/DL (ref 6.4–8.2)
RBC # BLD AUTO: 5 X10^6/UL (ref 3.82–5.3)
REACTIVE LYMPHS # (MANUAL): 0.03 X10^3/UL (ref 0–0)
REACTIVE LYMPHS % (MANUAL): 1 % (ref 0–0)
SEG#(MANUAL): 1.31 X10^3/UL (ref 1.8–6.8)
SEGS% (MANUAL): 41 % (ref 42–75)

## 2021-05-01 PROCEDURE — 85025 COMPLETE CBC W/AUTO DIFF WBC: CPT

## 2021-05-01 PROCEDURE — 99285 EMERGENCY DEPT VISIT HI MDM: CPT

## 2021-05-01 PROCEDURE — 93005 ELECTROCARDIOGRAM TRACING: CPT

## 2021-05-01 PROCEDURE — 71045 X-RAY EXAM CHEST 1 VIEW: CPT

## 2021-05-01 PROCEDURE — 36415 COLL VENOUS BLD VENIPUNCTURE: CPT

## 2021-05-01 PROCEDURE — 80053 COMPREHEN METABOLIC PANEL: CPT

## 2021-05-01 PROCEDURE — U0003 INFECTIOUS AGENT DETECTION BY NUCLEIC ACID (DNA OR RNA); SEVERE ACUTE RESPIRATORY SYNDROME CORONAVIRUS 2 (SARS-COV-2) (CORONAVIRUS DISEASE [COVID-19]), AMPLIFIED PROBE TECHNIQUE, MAKING USE OF HIGH THROUGHPUT TECHNOLOGIES AS DESCRIBED BY CMS-2020-01-R: HCPCS

## 2021-05-01 PROCEDURE — 83690 ASSAY OF LIPASE: CPT

## 2021-05-02 VITALS — DIASTOLIC BLOOD PRESSURE: 78 MMHG | SYSTOLIC BLOOD PRESSURE: 152 MMHG

## 2021-05-02 NOTE — NUR
pain improved to 3/10

patient educated on sxs to watch for worsening viral sxs

teach back successful

provided with taxi voucher

## 2022-04-17 ENCOUNTER — OFFICE VISIT (OUTPATIENT)
Dept: URGENT CARE | Facility: CLINIC | Age: 67
End: 2022-04-17
Payer: MEDICAID

## 2022-04-17 VITALS
RESPIRATION RATE: 18 BRPM | WEIGHT: 151 LBS | OXYGEN SATURATION: 98 % | BODY MASS INDEX: 27.79 KG/M2 | SYSTOLIC BLOOD PRESSURE: 130 MMHG | HEIGHT: 62 IN | DIASTOLIC BLOOD PRESSURE: 90 MMHG | TEMPERATURE: 97.8 F | HEART RATE: 71 BPM

## 2022-04-17 DIAGNOSIS — K30 INDIGESTION: ICD-10-CM

## 2022-04-17 DIAGNOSIS — R10.9 ABDOMINAL CRAMPS: ICD-10-CM

## 2022-04-17 LAB
APPEARANCE UR: CLEAR
BILIRUB UR STRIP-MCNC: NORMAL MG/DL
COLOR UR AUTO: YELLOW
GLUCOSE UR STRIP.AUTO-MCNC: NEGATIVE MG/DL
KETONES UR STRIP.AUTO-MCNC: NORMAL MG/DL
LEUKOCYTE ESTERASE UR QL STRIP.AUTO: NEGATIVE
NITRITE UR QL STRIP.AUTO: NEGATIVE
PH UR STRIP.AUTO: 5 [PH] (ref 5–8)
PROT UR QL STRIP: NEGATIVE MG/DL
RBC UR QL AUTO: NEGATIVE
SP GR UR STRIP.AUTO: 1.02
UROBILINOGEN UR STRIP-MCNC: NORMAL MG/DL

## 2022-04-17 PROCEDURE — 99214 OFFICE O/P EST MOD 30 MIN: CPT | Mod: 25 | Performed by: STUDENT IN AN ORGANIZED HEALTH CARE EDUCATION/TRAINING PROGRAM

## 2022-04-17 PROCEDURE — 81002 URINALYSIS NONAUTO W/O SCOPE: CPT | Performed by: STUDENT IN AN ORGANIZED HEALTH CARE EDUCATION/TRAINING PROGRAM

## 2022-04-17 RX ORDER — OMEPRAZOLE 20 MG/1
20 CAPSULE, DELAYED RELEASE ORAL DAILY
Qty: 90 CAPSULE | Refills: 1 | Status: SHIPPED | OUTPATIENT
Start: 2022-04-17 | End: 2023-03-03

## 2022-04-17 RX ORDER — DICYCLOMINE HCL 20 MG
TABLET ORAL
Qty: 90 TABLET | Refills: 0 | Status: SHIPPED | OUTPATIENT
Start: 2022-04-17 | End: 2023-01-10

## 2022-04-17 NOTE — PROGRESS NOTES
Subjective:   CHIEF COMPLAINT  Chief Complaint   Patient presents with   • Abdominal Pain     Stomach feels unsettled, painful. Feels burning, hot and cold. x on going month.   Medication refill: omeprazole   Needs referral new PCP       HPI  Sena Shepherd is a 66 y.o. female who history of acid reflux and cholecystectomy, presents with a chief complaint of epigastric and RUQ abdominal discomfort.  Symptoms have been ongoing for approximately 1 month.  Patient says she has been on omeprazole in the past which has helped and is requesting a refill of this medication.  Says the symptoms are triggered with food.  Symptoms do not radiate.  Says she also feels gassy and bloated.  She has not been experiencing nausea or vomiting.  No diarrhea.  No fevers, chills or body aches.  No dysuria.    REVIEW OF SYSTEMS  General: no fever or chills  GI: no nausea or vomiting  See HPI for further details.    PAST MEDICAL HISTORY  Patient Active Problem List    Diagnosis Date Noted   • Severe anxiety with panic 09/27/2017   • Moderate episode of recurrent major depressive disorder (HCC) 09/27/2017   • Elevated glucose 11/13/2015   • Goiter 11/13/2015   • Hyperlipidemia 09/08/2015   • Sore throat 10/23/2014   • Vitamin D deficiency 07/13/2014   • Chest pain 07/09/2014   • Hyperthyroidism 10/04/2012   • Depression with somatization 11/09/2009       SURGICAL HISTORY   has a past surgical history that includes cholecystectomy and cholecystectomy (2/2016).    ALLERGIES  Allergies   Allergen Reactions   • Nkda [No Known Drug Allergy]        CURRENT MEDICATIONS  Home Medications     Reviewed by Riaz Lal't (Medical Assistant) on 04/17/22 at 1256  Med List Status: <None>   Medication Last Dose Status   atenolol (TENORMIN) 25 MG Tab Not Taking Active   benzonatate (TESSALON) 100 MG Cap Not Taking Active   Doxepin HCl 3 MG Tab Not Taking Active   fexofenadine (ALLEGRA) 30 MG tablet Not Taking Active   lamoTRIgine  "(LAMICTAL) 100 MG Tab Taking Active   lamotrigine (LAMICTAL) 25 MG Tab Taking Active   meloxicam (MOBIC) 7.5 MG Tab Not Taking Active   mirtazapine (REMERON) 15 MG Tab Taking Active   omeprazole (PRILOSEC) 20 MG delayed-release capsule Not Taking Active   PARoxetine (PAXIL) 20 MG Tab Taking Active                SOCIAL HISTORY  Social History     Tobacco Use   • Smoking status: Never Smoker   • Smokeless tobacco: Never Used   Substance and Sexual Activity   • Alcohol use: No   • Drug use: No   • Sexual activity: Never     Comment: no sexual activity x 10 years       FAMILY HISTORY  No family history on file.       Objective:   PHYSICAL EXAM  VITAL SIGNS: /90   Pulse 71   Temp 36.6 °C (97.8 °F)   Resp 18   Ht 1.575 m (5' 2\")   Wt 68.5 kg (151 lb)   SpO2 98%   BMI 27.62 kg/m²     Gen: no acute distress, normal voice  Skin: dry, intact, moist mucosal membranes  Lungs: CTAB w/ symmetric expansion  CV: RRR w/o murmurs or clicks  Abdomen: Bowel sounds normal, soft, no tenderness, rebound or guarding.   Psych: normal affect, normal judgement, alert, awake    UA: No blood, nitrites or leukocytes.    Assessment/Plan:     1. Indigestion  Referral back to [facility] PCP    omeprazole (PRILOSEC) 20 MG delayed-release capsule    POCT Urinalysis   2. Abdominal cramps  Referral back to [facility] PCP    dicyclomine (BENTYL) 20 MG Tab    POCT Urinalysis   Patient is well hydrated, nontoxic with a reassuring abdominal examination.  Signs and symptoms are consistent with acid reflux; chronic issue, uncontrolled with acute exacerbation.  PSH of cholecystectomy.  -Refilled omeprazole  -Ordered Bentyl  -Ordered referral to establish primary care    Differential diagnosis, natural history, supportive care, and indications for immediate follow-up discussed. All questions answered. Patient agrees with the plan of care.    Follow-up as needed if symptoms worsen or fail to improve to PCP, Urgent care or Emergency Room.    Please note " that this dictation was created using voice recognition software. I have made a reasonable attempt to correct obvious errors, but I expect that there are errors of grammar and possibly content that I did not discover before finalizing the note.

## 2022-07-17 ENCOUNTER — OFFICE VISIT (OUTPATIENT)
Dept: URGENT CARE | Facility: CLINIC | Age: 67
End: 2022-07-17
Payer: MEDICAID

## 2022-07-17 ENCOUNTER — HOSPITAL ENCOUNTER (OUTPATIENT)
Facility: MEDICAL CENTER | Age: 67
End: 2022-07-17
Attending: PHYSICIAN ASSISTANT
Payer: MEDICAID

## 2022-07-17 VITALS
WEIGHT: 151 LBS | RESPIRATION RATE: 16 BRPM | DIASTOLIC BLOOD PRESSURE: 84 MMHG | HEART RATE: 84 BPM | BODY MASS INDEX: 26.75 KG/M2 | SYSTOLIC BLOOD PRESSURE: 120 MMHG | TEMPERATURE: 96.3 F | HEIGHT: 63 IN | OXYGEN SATURATION: 96 %

## 2022-07-17 DIAGNOSIS — R30.0 DYSURIA: ICD-10-CM

## 2022-07-17 LAB
APPEARANCE UR: CLEAR
BILIRUB UR STRIP-MCNC: NEGATIVE MG/DL
COLOR UR AUTO: NORMAL
FORWARD REASON: SPWHY: NORMAL
FORWARDED TO LAB: SPWHR: NORMAL
GLUCOSE UR STRIP.AUTO-MCNC: NEGATIVE MG/DL
KETONES UR STRIP.AUTO-MCNC: 15 MG/DL
LEUKOCYTE ESTERASE UR QL STRIP.AUTO: NEGATIVE
NITRITE UR QL STRIP.AUTO: NEGATIVE
PH UR STRIP.AUTO: 6 [PH] (ref 5–8)
PROT UR QL STRIP: NEGATIVE MG/DL
RBC UR QL AUTO: NEGATIVE
SP GR UR STRIP.AUTO: 1.02
SPECIMEN SENT (2ND): SPWT2: NORMAL
SPECIMEN SENT (3RD): SPWT3: NORMAL
SPECIMEN SENT (4TH): SPWT4: NORMAL
SPECIMEN SENT: SPWT1: NORMAL
UROBILINOGEN UR STRIP-MCNC: 0.2 MG/DL

## 2022-07-17 PROCEDURE — 81002 URINALYSIS NONAUTO W/O SCOPE: CPT | Performed by: PHYSICIAN ASSISTANT

## 2022-07-17 PROCEDURE — 99213 OFFICE O/P EST LOW 20 MIN: CPT | Performed by: PHYSICIAN ASSISTANT

## 2022-07-17 ASSESSMENT — ENCOUNTER SYMPTOMS
NAUSEA: 0
ABDOMINAL PAIN: 0
PALPITATIONS: 0
DIZZINESS: 0
SHORTNESS OF BREATH: 0
BLURRED VISION: 0
VOMITING: 0
FLANK PAIN: 0
CHILLS: 0
FEVER: 0

## 2022-07-17 NOTE — PROGRESS NOTES
Subjective     Sena Shepherd is a 67 y.o. female who presents with UTI (BURNING WITH URINATION)      UTI  This is a new problem. The current episode started more than 1 year ago (started a little over 1 year ago). The problem occurs constantly. Pertinent negatives include no abdominal pain, chest pain, chills, fever, nausea or vomiting. Nothing aggravates the symptoms. She has tried nothing for the symptoms.       Review of Systems   Constitutional: Negative for chills, fever and malaise/fatigue.   Eyes: Negative for blurred vision.   Respiratory: Negative for shortness of breath.    Cardiovascular: Negative for chest pain and palpitations.   Gastrointestinal: Negative for abdominal pain, nausea and vomiting.   Genitourinary: Positive for dysuria. Negative for flank pain, frequency and urgency.   Neurological: Negative for dizziness.         PMH:  has a past medical history of Anxiety, Arthralgia, Depression, GERD (gastroesophageal reflux disease), History of psychiatric symptoms, Hyperlipidemia, Hypertension, Hypothyroid, Menopause, Panic disorder, and Vitamin d deficiency.    She has no past medical history of Alcohol abuse, Alcoholism ([facility]), or Substance abuse ([facility]).  MEDS:   Current Outpatient Medications:   •  omeprazole (PRILOSEC) 20 MG delayed-release capsule, Take 1 Capsule by mouth every day., Disp: 90 Capsule, Rfl: 1  •  lamoTRIgine (LAMICTAL) 100 MG Tab, Take 100 mg by mouth every bedtime., Disp: , Rfl: 3  •  mirtazapine (REMERON) 15 MG Tab, Take 15 mg by mouth every evening., Disp: , Rfl:   •  PARoxetine (PAXIL) 20 MG Tab, Take 20 mg by mouth every day., Disp: , Rfl:   •  lamotrigine (LAMICTAL) 25 MG Tab, Take 50 mg by mouth every day., Disp: , Rfl:   •  dicyclomine (BENTYL) 20 MG Tab, 1 tab p.o. every 6 hours as needed abdominal discomfort, Disp: 90 Tablet, Rfl: 0  ALLERGIES:   Allergies   Allergen Reactions   • Nkda [No Known Drug Allergy]      SURGHX:   Past Surgical History:  "  Procedure Laterality Date   • CHOLECYSTECTOMY  2/2016   • CHOLECYSTECTOMY       SOCHX:  reports that she has never smoked. She has never used smokeless tobacco. She reports that she does not drink alcohol and does not use drugs.  FH: Family history was reviewed, no pertinent findings to report      Objective     /84   Pulse 84   Temp (!) 35.7 °C (96.3 °F) (Temporal)   Resp 16   Ht 1.6 m (5' 3\")   Wt 68.5 kg (151 lb)   SpO2 96%   BMI 26.75 kg/m²      Physical Exam  Constitutional:       Appearance: Normal appearance. She is well-developed.   HENT:      Head: Normocephalic and atraumatic.      Right Ear: External ear normal.      Left Ear: External ear normal.   Eyes:      Conjunctiva/sclera: Conjunctivae normal.      Pupils: Pupils are equal, round, and reactive to light.   Cardiovascular:      Rate and Rhythm: Normal rate and regular rhythm.      Heart sounds: No murmur heard.  Pulmonary:      Effort: Pulmonary effort is normal.      Breath sounds: Normal breath sounds.   Abdominal:      Palpations: Abdomen is soft.      Tenderness: There is no abdominal tenderness. There is no right CVA tenderness or left CVA tenderness.   Skin:     General: Skin is warm and dry.      Capillary Refill: Capillary refill takes less than 2 seconds.   Neurological:      Mental Status: She is alert and oriented to person, place, and time.   Psychiatric:         Behavior: Behavior normal.         Judgment: Judgment normal.         POCT Urinalysis  Lab Results   Component Value Date/Time    POCCOLOR dark yellow 07/17/2022 09:40 AM    POCAPPEAR clear 07/17/2022 09:40 AM    POCLEUKEST negative 07/17/2022 09:40 AM    POCNITRITE negative 07/17/2022 09:40 AM    POCUROBILIGE 0.2 07/17/2022 09:40 AM    POCPROTEIN negative 07/17/2022 09:40 AM    POCURPH 6.0 07/17/2022 09:40 AM    POCBLOOD negative 07/17/2022 09:40 AM    POCSPGRV 1.025 07/17/2022 09:40 AM    POCKETONES 15 07/17/2022 09:40 AM    POCBILIRUBIN negative 07/17/2022 09:40 " AM    POCGLUCUA negative 07/17/2022 09:40 AM            Assessment & Plan       1. Dysuria  - Referral to establish with [facility] PCP  - POCT Urinalysis  - URINALYSIS,CULTURE IF INDICATED; Future  - VAGINAL PATHOGENS DNA PANEL; Future  - Referral to Urology      Patient with complaints of burning with urination x1 year.  Does not describe any blood in the urine.  No flank pain.  No abdominal pain.  No systemic symptoms.  Urinalysis in the urgent care today does not show any signs of infection.  Will obtain a more formal urinalysis and a vaginal pathogen DNA panel to further evaluate her symptoms and to rule out other potential causes of the burning.  Referral to urology also placed to further evaluate her symptoms.  Referral also placed to establish with a primary care provider as patient does not currently have one.          Differential Diagnosis, natural history, and supportive care discussed. Return to the Urgent Care or follow up with your PCP if symptoms fail to resolve, or for any new or worsening symptoms. Emergency room precautions discussed. Patient and/or family appears understanding of information.

## 2022-12-28 ENCOUNTER — OFFICE VISIT (OUTPATIENT)
Dept: URGENT CARE | Facility: CLINIC | Age: 67
End: 2022-12-28
Payer: COMMERCIAL

## 2022-12-28 VITALS
OXYGEN SATURATION: 98 % | DIASTOLIC BLOOD PRESSURE: 80 MMHG | HEIGHT: 62 IN | SYSTOLIC BLOOD PRESSURE: 126 MMHG | TEMPERATURE: 98.3 F | RESPIRATION RATE: 16 BRPM | BODY MASS INDEX: 22.08 KG/M2 | HEART RATE: 72 BPM | WEIGHT: 120 LBS

## 2022-12-28 DIAGNOSIS — J02.9 PHARYNGITIS, UNSPECIFIED ETIOLOGY: ICD-10-CM

## 2022-12-28 DIAGNOSIS — R68.89 FLU-LIKE SYMPTOMS: ICD-10-CM

## 2022-12-28 DIAGNOSIS — Z20.822 SUSPECTED COVID-19 VIRUS INFECTION: ICD-10-CM

## 2022-12-28 DIAGNOSIS — J02.0 STREPTOCOCCAL PHARYNGITIS: Primary | ICD-10-CM

## 2022-12-28 DIAGNOSIS — Z00.00 HEALTH CARE MAINTENANCE: ICD-10-CM

## 2022-12-28 PROCEDURE — 87804 INFLUENZA ASSAY W/OPTIC: CPT | Performed by: NURSE PRACTITIONER

## 2022-12-28 PROCEDURE — 99213 OFFICE O/P EST LOW 20 MIN: CPT | Mod: CS | Performed by: NURSE PRACTITIONER

## 2022-12-28 PROCEDURE — 87426 SARSCOV CORONAVIRUS AG IA: CPT | Performed by: NURSE PRACTITIONER

## 2022-12-28 RX ORDER — AMOXICILLIN 875 MG/1
875 TABLET, COATED ORAL 2 TIMES DAILY
Qty: 20 TABLET | Refills: 0 | Status: SHIPPED | OUTPATIENT
Start: 2022-12-28 | End: 2023-01-07

## 2022-12-28 ASSESSMENT — ENCOUNTER SYMPTOMS
CHILLS: 1
VOMITING: 0
COUGH: 0
SINUS PAIN: 1
SORE THROAT: 1
HEADACHES: 0
DIARRHEA: 0
TROUBLE SWALLOWING: 1

## 2022-12-28 ASSESSMENT — FIBROSIS 4 INDEX: FIB4 SCORE: 1.49

## 2022-12-29 NOTE — PROGRESS NOTES
"Subjective:     Sena Shepherd is a 67 y.o. female who presents for Pharyngitis (Sore throat x 3 months, sinus pain, \"back numb, chest cold\")      Pharyngitis   This is a new problem. The current episode started more than 1 month ago (Sena comes to the clinic today with complaints of congestion, sore throat, and sinus pain X 3 months). The problem has been gradually worsening. There has been no fever. Associated symptoms include congestion and trouble swallowing. Pertinent negatives include no coughing, diarrhea, headaches or vomiting. Treatments tried: warm gargles. The treatment provided mild relief.       Review of Systems   Constitutional:  Positive for chills and malaise/fatigue.   HENT:  Positive for congestion, sinus pain, sore throat and trouble swallowing.    Respiratory:  Negative for cough.    Gastrointestinal:  Negative for diarrhea and vomiting.   Neurological:  Negative for headaches.     PMH:   Past Medical History:   Diagnosis Date    Anxiety     Arthralgia     Depression     GERD (gastroesophageal reflux disease)     History of psychiatric symptoms     Hyperlipidemia     Hypertension     Hypothyroid     Menopause     Panic disorder     Vitamin d deficiency      ALLERGIES:   Allergies   Allergen Reactions    Nkda [No Known Drug Allergy]      SURGHX:   Past Surgical History:   Procedure Laterality Date    CHOLECYSTECTOMY  2/2016    CHOLECYSTECTOMY       SOCHX:   Social History     Socioeconomic History    Marital status:    Tobacco Use    Smoking status: Never    Smokeless tobacco: Never   Vaping Use    Vaping Use: Never used   Substance and Sexual Activity    Alcohol use: No    Drug use: No    Sexual activity: Never     Comment: no sexual activity x 10 years     FH: No family history on file.      Objective:   /80   Pulse 72   Temp 36.8 °C (98.3 °F) (Temporal)   Resp 16   Ht 1.575 m (5' 2\")   Wt 54.4 kg (120 lb)   SpO2 98%   BMI 21.95 kg/m²     Physical " Exam  Constitutional:       Appearance: She is ill-appearing.   HENT:      Head: Normocephalic.      Right Ear: Tympanic membrane, ear canal and external ear normal.      Left Ear: Tympanic membrane, ear canal and external ear normal.      Mouth/Throat:      Mouth: Mucous membranes are moist.      Pharynx: Uvula midline. Pharyngeal swelling and posterior oropharyngeal erythema present. No oropharyngeal exudate or uvula swelling.      Tonsils: No tonsillar exudate or tonsillar abscesses. 1+ on the right. 1+ on the left.   Eyes:      Extraocular Movements: Extraocular movements intact.      Pupils: Pupils are equal, round, and reactive to light.   Cardiovascular:      Rate and Rhythm: Normal rate and regular rhythm.   Abdominal:      Tenderness: There is no abdominal tenderness.   Musculoskeletal:      Cervical back: Tenderness present.   Lymphadenopathy:      Cervical: Cervical adenopathy present.     1. Streptococcal pharyngitis  amoxicillin (AMOXIL) 875 MG tablet      2. Flu-like symptoms  POCT Influenza A/B      3. Suspected COVID-19 virus infection  POCT SARS-COV Antigen BELIA (Symptomatic only)      4. Pharyngitis, unspecified etiology  POCT Rapid Strep A          Assessment/Plan:   Assessment    1. Streptococcal pharyngitis  amoxicillin (AMOXIL) 875 MG tablet      2. Flu-like symptoms  POCT Influenza A/B      3. Suspected COVID-19 virus infection  POCT SARS-COV Antigen BELIA (Symptomatic only)      4. Pharyngitis, unspecified etiology  POCT Rapid Strep A        We discussed supportive measures including humidifier, warm salt water gargles, over-the-counter Cepacol throat lozenges, rest  and increased fluids. Pt was encouraged to seek treatment back in the ER or urgent care for worsening symptoms,  fever greater than 100.5, wheezes or shortness of breath.    AVS handout given and reviewed with patient. Pt educated on red flags and when to seek treatment back in ER or UC.

## 2023-01-02 LAB
EXTERNAL QUALITY CONTROL: NORMAL
FLUAV+FLUBV AG SPEC QL IA: NEGATIVE
INT CON NEG: NORMAL
INT CON NEG: NORMAL
INT CON POS: NORMAL
INT CON POS: NORMAL
SARS-COV+SARS-COV-2 AG RESP QL IA.RAPID: NEGATIVE

## 2023-01-10 ENCOUNTER — OFFICE VISIT (OUTPATIENT)
Dept: INTERNAL MEDICINE | Facility: OTHER | Age: 68
End: 2023-01-10
Payer: COMMERCIAL

## 2023-01-10 VITALS
SYSTOLIC BLOOD PRESSURE: 116 MMHG | TEMPERATURE: 98.6 F | WEIGHT: 142 LBS | HEIGHT: 63 IN | DIASTOLIC BLOOD PRESSURE: 75 MMHG | OXYGEN SATURATION: 95 % | HEART RATE: 62 BPM | BODY MASS INDEX: 25.16 KG/M2

## 2023-01-10 DIAGNOSIS — E05.90 HYPERTHYROIDISM: ICD-10-CM

## 2023-01-10 DIAGNOSIS — F41.0 SEVERE ANXIETY WITH PANIC: ICD-10-CM

## 2023-01-10 DIAGNOSIS — F33.1 MODERATE EPISODE OF RECURRENT MAJOR DEPRESSIVE DISORDER (HCC): ICD-10-CM

## 2023-01-10 DIAGNOSIS — J32.4 CHRONIC PANSINUSITIS: ICD-10-CM

## 2023-01-10 DIAGNOSIS — Z12.31 ENCOUNTER FOR SCREENING MAMMOGRAM FOR MALIGNANT NEOPLASM OF BREAST: ICD-10-CM

## 2023-01-10 PROCEDURE — 99214 OFFICE O/P EST MOD 30 MIN: CPT | Mod: GC

## 2023-01-10 RX ORDER — MIRTAZAPINE 30 MG/1
30 TABLET, FILM COATED ORAL
COMMUNITY
End: 2023-01-10

## 2023-01-10 RX ORDER — ACETAMINOPHEN 500 MG
1000 TABLET ORAL 3 TIMES DAILY
Qty: 60 TABLET | Refills: 0 | Status: SHIPPED | OUTPATIENT
Start: 2023-01-10 | End: 2023-01-20

## 2023-01-10 RX ORDER — PREDNISONE 20 MG/1
TABLET ORAL
COMMUNITY
Start: 2022-12-09 | End: 2023-01-10

## 2023-01-10 RX ORDER — LAMOTRIGINE 25 MG/1
25 TABLET ORAL DAILY
COMMUNITY

## 2023-01-10 RX ORDER — AZITHROMYCIN 250 MG/1
TABLET, FILM COATED ORAL
COMMUNITY
Start: 2022-12-09 | End: 2023-01-10

## 2023-01-10 RX ORDER — BENZONATATE 100 MG/1
CAPSULE ORAL
COMMUNITY
Start: 2022-12-09 | End: 2023-01-10

## 2023-01-10 RX ORDER — AMOXICILLIN AND CLAVULANATE POTASSIUM 875; 125 MG/1; MG/1
1 TABLET, FILM COATED ORAL 2 TIMES DAILY
Qty: 14 TABLET | Refills: 0 | Status: SHIPPED | OUTPATIENT
Start: 2023-01-10 | End: 2023-01-17

## 2023-01-10 RX ORDER — PAROXETINE 30 MG/1
30 TABLET, FILM COATED ORAL
COMMUNITY
End: 2023-01-10

## 2023-01-10 RX ORDER — PAROXETINE 30 MG/1
20 TABLET, FILM COATED ORAL
COMMUNITY
Start: 2022-12-25

## 2023-01-10 RX ORDER — ALBUTEROL SULFATE 90 UG/1
AEROSOL, METERED RESPIRATORY (INHALATION)
COMMUNITY
Start: 2022-12-09 | End: 2023-01-10

## 2023-01-10 RX ORDER — FLUTICASONE PROPIONATE 50 MCG
2 SPRAY, SUSPENSION (ML) NASAL 2 TIMES DAILY
Qty: 50 ML | Refills: 0 | Status: SHIPPED | OUTPATIENT
Start: 2023-01-10 | End: 2023-02-10

## 2023-01-10 RX ORDER — MIRTAZAPINE 30 MG/1
TABLET, FILM COATED ORAL
COMMUNITY
Start: 2022-12-25

## 2023-01-10 ASSESSMENT — FIBROSIS 4 INDEX: FIB4 SCORE: 1.49

## 2023-01-10 ASSESSMENT — PATIENT HEALTH QUESTIONNAIRE - PHQ9
7. TROUBLE CONCENTRATING ON THINGS, SUCH AS READING THE NEWSPAPER OR WATCHING TELEVISION: SEVERAL DAYS
4. FEELING TIRED OR HAVING LITTLE ENERGY: SEVERAL DAYS
6. FEELING BAD ABOUT YOURSELF - OR THAT YOU ARE A FAILURE OR HAVE LET YOURSELF OR YOUR FAMILY DOWN: SEVERAL DAYS
3. TROUBLE FALLING OR STAYING ASLEEP OR SLEEPING TOO MUCH: SEVERAL DAYS
SUM OF ALL RESPONSES TO PHQ QUESTIONS 1-9: 7
SUM OF ALL RESPONSES TO PHQ9 QUESTIONS 1 AND 2: 2
8. MOVING OR SPEAKING SO SLOWLY THAT OTHER PEOPLE COULD HAVE NOTICED. OR THE OPPOSITE, BEING SO FIGETY OR RESTLESS THAT YOU HAVE BEEN MOVING AROUND A LOT MORE THAN USUAL: NOT AT ALL
9. THOUGHTS THAT YOU WOULD BE BETTER OFF DEAD, OR OF HURTING YOURSELF: NOT AT ALL
2. FEELING DOWN, DEPRESSED, IRRITABLE, OR HOPELESS: SEVERAL DAYS
5. POOR APPETITE OR OVEREATING: SEVERAL DAYS
1. LITTLE INTEREST OR PLEASURE IN DOING THINGS: SEVERAL DAYS

## 2023-01-10 NOTE — PATIENT INSTRUCTIONS
Por favor lavese la nariz las veces que necesite con el Nasal Saline Spray (lo tiene que comprar), para mantener la nariz limpia  Wild Rose tylenol 2 pastillas 3 veces al ayana para el dolor y al fiebre  Por favor use Flonase 2 veces al ayana en cada fosa nasal   Por favor tome demetrius tableta de Augmentin (antibiotico para las bacterias) 2 veces al ayana   Por favor varun la ginny para la tomografia de la alycia y senos paranasales  Por favor compre un Humidifier para humidificar el aire de la casa Por favor rogerio videos de yas utilizar sprays nasales

## 2023-01-10 NOTE — PROGRESS NOTES
Office Visit Initial Encounter    Chief Complaint   Patient presents with    New Patient     Was seen at Urgent care for same issue, history of thyroid issues    Pharyngitis    Sinus Problem     congestion       HPI   Ms Leno is a 66 yo female with PMH of KAY with panic attacks, MDD, hyperthyroidism (goiter, treated with methimazole until 2016), hyperlipidemia and hyperglycemia, with a recent ED visit for streptococcal pharyngitis, another for nonspecific chest pain, and an upper respiratory infection: sinusitis with dry cough.     The patient comes in today with complains of severe persistent facial pain, runny nose with occasional yellow to green nasal discharge, also eye pain, eye lacrimation and bilateral ear pain. She presented with these symptoms along with fever and chills last time and received Amoxicillin x 10 days, which she completed around 1 week ago, however pain persists as well as chills, cough and sore throat are resolved but eyes and ear pain is getting worse. She also comes for establishment of care. Her last labs in the ED were unremarkable (CMP). She denies pain with ocular movements, denies reflux symptoms, she reports that she has had seasonal or specific allergies in the past and feeling congested occasionally.     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 PHQ-9 is 9 points. She does have compulsion for hygiene, order and is very strict in all dimensions, she does get depressed when she is unable to perform well in those shores and she does have to find activities for those intrusive thoughts.         Past Medical History  Past Medical History:   Diagnosis Date    Anxiety     Arthralgia     Depression     GERD (gastroesophageal reflux disease)     History of psychiatric symptoms     Hyperlipidemia     Hypertension     Hypothyroid     Menopause     Panic disorder     Vitamin d deficiency   "      Allergies:     Nkda [no known drug allergy]    Medications    Current Outpatient Medications:     mirtazapine, TAKE 1 TABLET BY MOUTH EVERY DAY AT NIGHT, Taking    PARoxetine, 30 mg, Oral, QDAY, Taking    fluticasone, 2 Spray, Nasal, BID    acetaminophen, 1,000 mg, Oral, TID    amoxicillin-clavulanate, 1 Tablet, Oral, BID    omeprazole, 20 mg, Oral, DAILY, PRN    lamoTRIgine, 100 mg, Oral, QHS, Taking    lamoTRIgine, 25 mg, Oral, DAILY (Patient not taking: Reported on 1/10/2023), Not Taking    Family History  No family history on file.    Surgical History  Past Surgical History:   Procedure Laterality Date    CHOLECYSTECTOMY  2/2016    CHOLECYSTECTOMY         Social History   Social History     Tobacco Use    Smoking status: Never    Smokeless tobacco: Never   Vaping Use    Vaping Use: Never used   Substance Use Topics    Alcohol use: No    Drug use: No       ROS     General: No weight loss or gain.  H&N: As above, no hearing changes.   Pulmonary: No shortness of breath, cough, sputum, or hemoptysis.  Cardiovascular: Occasional chest pain and palpitations along with shortness of breath and fear (not exertional, no specific pattern), no LE swelling.   GI: No nausea, vomiting, diarrhea, constipation, abdominal pain, hematochezia or melena. Poor appetite recently. She does complain of intermittent constipation and protruding lesions over the rectum (hemorrhoids, no treatment so far), she did have an episode of severe pain over the lesions that relieved with baths, now she denies pain, bleeding or constipation.   : No dysuria, frequency, retention or hematuria.   Neuro: No headaches, no lightheadedness, no dizziness. No focal weakness, no general weakness. No gait disturbances.   Psych: + as per HPI.     Physical Exam     /75 (BP Location: Left arm, Patient Position: Sitting, BP Cuff Size: Adult)   Pulse 62   Temp 37 °C (98.6 °F) (Temporal)   Ht 1.6 m (5' 3\")   Wt 64.4 kg (142 lb)   SpO2 95%   BMI " 25.15 kg/m²     General: Awake, alert and oriented.  Head and Neck: NC/AT, EOMI, no scleral icterus or conjunctival pallor. Has yellowish nasal discharge, severe left septum deviation, turbinates hypertrophy bilaterally, severe facial pain (tenderness to palpation), inflamed lacrimal sac, lacrimation, oropharyngeal erythema, no exudates. Bilateral otoscopy with intact tympanic membrane but mild clear effusion and erythema bilaterally. Neck supple, no cervical or supraclavicular LAD.   CV: Rhythmic heart sounds, no murmurs, gallops or rubs. No S3,S4 or JVD. Radial and dorsalis pedis pulses 2+ and equal bilaterally.   Pulm: Chest expansion is symmetrical, no crackles, rales, rhonchi, or wheezing.  GI: Flat, non distended, soft, nontender, normal bowel sounds.  Skin: Warm, no rashes, no lesions.  MSK: Normal ROM. No lower extremity edema. No pain, no lesions,   Neuro: Patient is alert and oriented x3. Pupils equal, round and reactive to light. Extra ocular movements intact. Facial and body symmetry. Strength 5 out of 5 in upper and lower extremities. Sensitivity intact. Normal deep tendon reflexes. Normal tone. No gait abnormalities.   Psych: Appropriate mood and affect.     Diagnostic tests  No lipid panel or thyroid function since 2019. Last lipid panel with mixed hyperlipidemia and normal TSH.     Note: I have reviewed all pertinent labs and diagnostic tests associated with this visit with specific comments listed under the assessment and plan below    Assessment and Plan    Chronic Pansinusitis  History of untreated allergy symptoms. Now presents with 1 month of inflammatory symptoms as above, raising concern for bacterial component. Received 1 course of azithromycin, amoxicillin and symptoms are worsening again. Also has bilateral otitis media with effusion and lacrimal  sac compromise. She benefits from further antibiotic therapy along with topical antiinflammatory management (likely long term). Also benefits  from evaluating inflammation and possible underlying lesions with imaging.   - CT sinuses  - Augmentin BID for 7 days   - Flonase spray twice a day   - Nasal baths with saline solution as needed   - Tylenol for pain and/or fever    History of hyperthyroidism   She had goiter ([place]) and was treated with methimazole for around 10 years until 2016. She does have occasional hot flushes, palpitations, panic attacks and emotional symptoms that could be related if uncontrolled. Last TSH in 2019, they had been 0.7-1.8.    - New TSH/T4    MDD  KAY  Symptoms are controlled, however her condition is also compatible with OCT.   - Continue mirtazapine, paroxetine and lamotrigine for now, will reevaluate  - She is to consider a therapy referral     Screening to be updated for now: breast cancer.   - New lipid panel (History of DLD)   - New vitamin D (History of deficiency)       Return in about 2 weeks (around 1/24/2023). To address response to prescribed treatment for pansinusitis and for hemorrhoids assessment.   I will see the patient in 1 month to continue establishment of care.     Laura AGARWAL PGY1  Internal Medicine [employer]    Pt has been seen and discussed with the Attending Physician

## 2023-01-26 ENCOUNTER — APPOINTMENT (OUTPATIENT)
Dept: LAB | Facility: MEDICAL CENTER | Age: 68
End: 2023-01-26
Payer: COMMERCIAL

## 2023-01-26 ENCOUNTER — TELEPHONE (OUTPATIENT)
Dept: HEALTH INFORMATION MANAGEMENT | Facility: OTHER | Age: 68
End: 2023-01-26

## 2023-02-10 RX ORDER — FLUTICASONE PROPIONATE 50 MCG
2 SPRAY, SUSPENSION (ML) NASAL 2 TIMES DAILY
Qty: 16 ML | Refills: 2 | Status: SHIPPED | OUTPATIENT
Start: 2023-02-10 | End: 2023-03-12

## 2023-02-15 ENCOUNTER — OFFICE VISIT (OUTPATIENT)
Dept: INTERNAL MEDICINE | Facility: OTHER | Age: 68
End: 2023-02-15
Payer: MEDICAID

## 2023-02-15 VITALS
WEIGHT: 148.8 LBS | HEART RATE: 76 BPM | DIASTOLIC BLOOD PRESSURE: 80 MMHG | SYSTOLIC BLOOD PRESSURE: 123 MMHG | OXYGEN SATURATION: 97 % | TEMPERATURE: 98.4 F | BODY MASS INDEX: 26.36 KG/M2 | HEIGHT: 63 IN

## 2023-02-15 DIAGNOSIS — R73.03 PREDIABETES: ICD-10-CM

## 2023-02-15 DIAGNOSIS — K64.9 HEMORRHOIDS, UNSPECIFIED HEMORRHOID TYPE: ICD-10-CM

## 2023-02-15 DIAGNOSIS — E05.90 HYPERTHYROIDISM: ICD-10-CM

## 2023-02-15 DIAGNOSIS — J32.4 CHRONIC PANSINUSITIS: ICD-10-CM

## 2023-02-15 DIAGNOSIS — F41.0 SEVERE ANXIETY WITH PANIC: ICD-10-CM

## 2023-02-15 PROCEDURE — 99213 OFFICE O/P EST LOW 20 MIN: CPT | Mod: GE

## 2023-02-15 RX ORDER — MONTELUKAST SODIUM 10 MG/1
10 TABLET ORAL DAILY
Qty: 50 TABLET | Refills: 0 | Status: SHIPPED | OUTPATIENT
Start: 2023-02-15 | End: 2023-03-13

## 2023-02-15 RX ORDER — DOXEPIN HYDROCHLORIDE 10 MG/1
10 CAPSULE ORAL NIGHTLY
Qty: 30 CAPSULE | Refills: 0 | Status: SHIPPED | OUTPATIENT
Start: 2023-02-15

## 2023-02-15 RX ORDER — ATORVASTATIN CALCIUM 20 MG/1
20 TABLET, FILM COATED ORAL NIGHTLY
Qty: 90 TABLET | Refills: 0 | Status: SHIPPED | OUTPATIENT
Start: 2023-02-15

## 2023-02-15 RX ORDER — GLYCEROL, PHENYLEPHINE, PRAMOXINE, PETROLATUM .25; 1; 15; 14.4 G/100G; G/100G; G/100G; G/100G
1 CREAM TOPICAL
Qty: 51 G | Refills: 1 | Status: SHIPPED | OUTPATIENT
Start: 2023-02-15 | End: 2023-03-16

## 2023-02-15 ASSESSMENT — FIBROSIS 4 INDEX: FIB4 SCORE: 1.49

## 2023-02-15 NOTE — PATIENT INSTRUCTIONS
For the nose and face:  Take Montelukast  For hemorrhoids:  Apply the cream before bed  Buy special wipes for hemorrhoids  Please consider purchasing Metamucyl to take once a day.  Please take the labs before your next appointment.  for cholesterol  Start taking atorvastatin every night

## 2023-02-15 NOTE — PROGRESS NOTES
"      Office Visit Follow up    Chief Complaint   Patient presents with    Pharyngitis    Follow-Up     ED visit        Subjective   Ms Leon is a 68 yo female with PMH of KAY with panic attacks, MDD, hyperthyroidism (goiter, treated with methimazole until 2016), hyperlipidemia and hyperglycemia and recent diagnosed with chronic rhinosinusitis with an acute bacterial sinusitis on last visit treated with amoxicillin/clavulanate.  Acute purulent nasal discharge, ear pain, fever and chills resolved, however facial fullness and pain, constant nasal congestion, postnasal drip, sore throat, ear fullness and eye lacrimation persist worsening with cold exposure.    She did see her psychiatrist on Monday, no adjustments in medications were made, she reports her symptoms are controlled and occasional panic attacks persist with some difficulty sleeping.  No suicidal ideation.      ROS     General: No fevers, chills, night sweats, weight loss or gain.  H&N: No hearing changes, vision changes, eye pain.  No epistaxis.  Pulmonary: No shortness of breath, cough, sputum, or hemoptysis.  Cardiovascular: No chest pain, palpitations, or LE swelling.   GI: No nausea, vomiting, diarrhea, abdominal pain, hematochezia or melena.  She does complain of significant anal pain with stress/weight lifting/intermittent constipation with no episodes of bleeding but a masslike sensation.  : No dysuria, frequency, retention or hematuria.   Neuro: No lightheadedness, no dizziness. No focal weakness, no general weakness. No gait disturbances.   Psych: No anxiety or depression.     Physical Exam     /80 (BP Location: Left arm, Patient Position: Sitting, BP Cuff Size: Adult)   Pulse 76   Temp 36.9 °C (98.4 °F) (Temporal)   Ht 1.6 m (5' 3\")   Wt 67.5 kg (148 lb 12.8 oz)   SpO2 97%   BMI 26.36 kg/m²       General: Awake, alert and oriented.  Head and Neck: NC/AT, EOMI, no scleral icterus or conjunctival pallor. Has clear and mild nasal " discharge, mild left septum deviation, turbinates hypertrophy bilaterally, no facial tenderness, inflamed lacrimal sac, mild oropharyngeal erythema, no exudates. Bilateral otoscopy with intact tympanic membrane, no erythema. Neck supple, no cervical or supraclavicular LAD.   CV: Rhythmic heart sounds, no murmurs, gallops or rubs. No S3,S4 or JVD. Dorsalis pedis/posterior tibial pulses present, symmetrical.   Pulm: Chest expansion is symmetrical, no crackles, rales, rhonchi, or wheezing.  GI: Flat, non distended, soft, non tender, normal bowel sounds.  Perineal exam with multiple external hemorrhoids with no discoloration or inflammation, one small internal hemorrhoid with no discoloration (6').  Skin: Warm, no rashes, no lesions.  MSK: Normal ROM. No lower extremity edema. No lesions.   Neuro: Patient is alert and oriented x3. Speech is clear and fluent. Is able to name, repeat and comprehend. Pupils equal, round and reactive to light. Extra ocular movements intact. Facial and body symmetry. Strength 5 out of 5 in upper and lower extremities. Sensitivity intact. Normal deep tendon reflexes. Normal tone.   Psych: Appropriate mood and affect.     Diagnostic tests  Lipid panel:    Vit D 37    Note: I have reviewed all pertinent labs and diagnostic tests associated with this visit with specific comments listed under the assessment and plan below    Assessment and Plan    Chronic Pansinusitis  History of untreated allergy symptoms. Recently treated for acute on chronic sinusitis. CT of sinuses negative for lesions (polyps, obstruction). Simptoms are controlled to 50%, exacerbated by could, highly disturbing.  - Flonase spray twice a day   - Nasal baths with saline solution as needed   - Start Montelukast daily     Hyperlipidemia   ASCVD risk 5.6%. No family history of early heart disease, but pt has chronically uncontrolled LDL around 200s.  - Start moderate dose statin   - Repeat A1C     History of  hyperthyroidism   She had goiter ([place]) and was treated with methimazole for around 10 years until 2016. She does have occasional hot flushes, palpitations, panic attacks and emotional symptoms that could be related if uncontrolled. Last TSH in 2019, they had been 0.7-1.8. She reports she was recently tested at the ED, but no in files.  - Repeat TSH/T4    Hemorrhoids   External and one small internal, non complicated. Start management for intermittent constipation and topicals. Avoid steroids for now. Will evaluate response.   - Topical petrolatum-glycerin cream every bedtime  - Metamucil for constipation, avoid triggers of it  - Monitor     MDD  KAY  Symptoms are controlled,also some symptoms of OCD. No frequent panic attacks, no SI.    - Continue mirtazapine, paroxetine and lamotrigine for now, per psychiatry  - Patient reports using Doxepin per psychiatry rec for sleeping aid, uses 3 times a month. Short-term refill until psych follow - up      Return in about 5 weeks (around 3/22/2023).      Laura AGARWAL PGY1  Internal Medicine [employer]    Pt has been seen and discussed with the Attending Physician

## 2023-02-20 ENCOUNTER — OFFICE VISIT (OUTPATIENT)
Dept: URGENT CARE | Facility: CLINIC | Age: 68
End: 2023-02-20
Payer: MEDICAID

## 2023-02-20 VITALS
BODY MASS INDEX: 23.04 KG/M2 | OXYGEN SATURATION: 98 % | WEIGHT: 130 LBS | HEIGHT: 63 IN | TEMPERATURE: 98.9 F | SYSTOLIC BLOOD PRESSURE: 120 MMHG | HEART RATE: 82 BPM | DIASTOLIC BLOOD PRESSURE: 78 MMHG | RESPIRATION RATE: 16 BRPM

## 2023-02-20 DIAGNOSIS — B96.89 BACTERIAL SINUSITIS: ICD-10-CM

## 2023-02-20 DIAGNOSIS — J32.9 BACTERIAL SINUSITIS: ICD-10-CM

## 2023-02-20 PROCEDURE — 99213 OFFICE O/P EST LOW 20 MIN: CPT

## 2023-02-20 RX ORDER — METHYLPREDNISOLONE 4 MG/1
TABLET ORAL
Qty: 1 EACH | Refills: 0 | Status: SHIPPED | OUTPATIENT
Start: 2023-02-20 | End: 2023-03-16

## 2023-02-20 RX ORDER — INDOMETHACIN 75 MG/1
75 CAPSULE, EXTENDED RELEASE ORAL DAILY
Qty: 30 CAPSULE | Refills: 0 | Status: SHIPPED | OUTPATIENT
Start: 2023-02-20 | End: 2023-03-16

## 2023-02-20 RX ORDER — DOXYCYCLINE HYCLATE 100 MG
100 TABLET ORAL 2 TIMES DAILY
Qty: 14 TABLET | Refills: 0 | Status: SHIPPED | OUTPATIENT
Start: 2023-02-20 | End: 2023-02-27

## 2023-02-20 RX ORDER — ADHESIVE TAPE 1.5"X360"
TAPE, NON-MEDICATED TOPICAL
Qty: 14.1 G | Refills: 0 | Status: SHIPPED | OUTPATIENT
Start: 2023-02-20 | End: 2023-03-16

## 2023-02-20 ASSESSMENT — FIBROSIS 4 INDEX: FIB4 SCORE: 1.49

## 2023-02-21 NOTE — PROGRESS NOTES
Subjective:   Sena Shepherd is a 67 y.o. female who presents for Pharyngitis (Has been going on for 3 months )      HPI:    Patient presents to urgent care with concerns of sore throat, burning sensation in her nose.   Onset was  three months ago.  Mild improvement with tylenol, antibiotics.   Reports subjective fever, chills, night sweats  States Flonase has dried out her sinuses and she develops epistaxis with use.   Has been evaluated in the ER for similar issue.   Denies congestion, cough.   Reports use of Omeprazole for heartburn. Denies worsening heartburn, nausea, vomiting, diarrhea.   Denies abdominal pain.       ROS As above in HPI    Medications:    Current Outpatient Medications on File Prior to Visit   Medication Sig Dispense Refill    montelukast (SINGULAIR) 10 MG Tab Take 1 Tablet by mouth every day. 50 Tablet 0    atorvastatin (LIPITOR) 20 MG Tab Take 1 Tablet by mouth every evening. 90 Tablet 0    doxepin (SINEQUAN) 10 MG Cap Take 1 Capsule by mouth every evening. 30 Capsule 0    pramox-PE-glycerin-petrolatum (HEMORRHOIDAL) 1-0.25-14.4-15 % Cream Insert 1 g into the rectum at bedtime. 51 g 1    fluticasone (FLONASE) 50 MCG/ACT nasal spray ADMINISTER 2 SPRAYS INTO AFFECTED NOSTRIL(S) 2 TIMES A DAY FOR 30 DAYS. 16 mL 2    mirtazapine (REMERON) 30 MG Tab tablet TAKE 1 TABLET BY MOUTH EVERY DAY AT NIGHT      PARoxetine (PAXIL) 30 MG Tab Take 30 mg by mouth every day.      omeprazole (PRILOSEC) 20 MG delayed-release capsule Take 1 Capsule by mouth every day. 90 Capsule 1    lamoTRIgine (LAMICTAL) 100 MG Tab Take 100 mg by mouth at bedtime.  3    lamoTRIgine (LAMICTAL) 25 MG Tab Take 25 mg by mouth every day. (Patient not taking: Reported on 1/10/2023)       No current facility-administered medications on file prior to visit.        Allergies:   Nkda [no known drug allergy]    Problem List:   Patient Active Problem List   Diagnosis    Depression with somatization    Hyperthyroidism     "Vitamin D deficiency    Sore throat    Hyperlipidemia    Elevated glucose    Goiter    Severe anxiety with panic    Moderate episode of recurrent major depressive disorder (HCC)        Surgical History:  Past Surgical History:   Procedure Laterality Date    CHOLECYSTECTOMY  2/2016    CHOLECYSTECTOMY         Past Social Hx:   Social History     Tobacco Use    Smoking status: Never    Smokeless tobacco: Never   Vaping Use    Vaping Use: Never used   Substance Use Topics    Alcohol use: No    Drug use: No          Problem list, medications, and allergies reviewed by myself today in Epic.     Objective:     /78 (BP Location: Left arm, Patient Position: Sitting, BP Cuff Size: Adult)   Pulse 82   Temp 37.2 °C (98.9 °F) (Temporal)   Resp 16   Ht 1.6 m (5' 3\")   Wt 59 kg (130 lb)   SpO2 98%   BMI 23.03 kg/m²     Physical Exam  Vitals and nursing note reviewed.   Constitutional:       Appearance: Normal appearance.   HENT:      Head: Normocephalic and atraumatic.      Right Ear: Tympanic membrane and ear canal normal.      Left Ear: Tympanic membrane and ear canal normal.      Nose: Mucosal edema and rhinorrhea present. No congestion. Rhinorrhea is purulent.      Right Nostril: No foreign body, epistaxis, septal hematoma or occlusion.      Left Nostril: No foreign body, epistaxis, septal hematoma or occlusion.      Right Turbinates: Swollen.      Left Turbinates: Swollen.      Right Sinus: Maxillary sinus tenderness and frontal sinus tenderness present.      Left Sinus: Maxillary sinus tenderness and frontal sinus tenderness present.   Cardiovascular:      Rate and Rhythm: Normal rate and regular rhythm.      Heart sounds: Normal heart sounds. No murmur heard.    No friction rub. No gallop.   Pulmonary:      Effort: Pulmonary effort is normal.   Abdominal:      General: Bowel sounds are normal. There is no distension.      Palpations: Abdomen is soft.      Tenderness: There is no abdominal tenderness. There is " no guarding or rebound.   Musculoskeletal:      Cervical back: No rigidity or tenderness.      Right lower leg: No edema.      Left lower leg: No edema.   Lymphadenopathy:      Cervical: No cervical adenopathy.   Skin:     General: Skin is warm and dry.      Capillary Refill: Capillary refill takes less than 2 seconds.      Findings: No rash.   Neurological:      Mental Status: She is alert and oriented to person, place, and time.       Assessment/Plan:       Diagnosis and associated orders:   1. Bacterial sinusitis  - Referral to ENT  - methylPREDNISolone (MEDROL DOSEPAK) 4 MG Tablet Therapy Pack; Follow schedule on package instructions.  Dispense: 1 Each; Refill: 0  - doxycycline (VIBRAMYCIN) 100 MG Tab; Take 1 Tablet by mouth 2 times a day for 7 days.  Dispense: 14 Tablet; Refill: 0  - Saline Gel; Apply thin layer to affected nares as needed.  Dispense: 14.1 g; Refill: 0  - indomethacin SR (INDOCIN SR) 75 MG Cap CR; Take 1 Capsule by mouth every day.  Dispense: 30 Capsule; Refill: 0        Comments/MDM:     Supportive measures, differential diagnoses reviewed  Discontinue use of Flonase due to reports of dryness and epistaxis  Referral to ENT placed  Follow up with PCP          Please note that this dictation was created using voice recognition software. I have made a reasonable attempt to correct obvious errors, but I expect that there are errors of grammar and possibly content that I did not discover before finalizing the note.    This note was electronically signed by Letty Lee DNP

## 2023-03-03 ENCOUNTER — TELEPHONE (OUTPATIENT)
Dept: HEALTH INFORMATION MANAGEMENT | Facility: OTHER | Age: 68
End: 2023-03-03

## 2023-03-03 ENCOUNTER — HOSPITAL ENCOUNTER (OUTPATIENT)
Facility: MEDICAL CENTER | Age: 68
End: 2023-03-03
Payer: MEDICAID

## 2023-03-03 ENCOUNTER — OFFICE VISIT (OUTPATIENT)
Dept: URGENT CARE | Facility: CLINIC | Age: 68
End: 2023-03-03
Payer: MEDICAID

## 2023-03-03 VITALS
WEIGHT: 145 LBS | DIASTOLIC BLOOD PRESSURE: 78 MMHG | OXYGEN SATURATION: 98 % | TEMPERATURE: 98.1 F | HEART RATE: 78 BPM | RESPIRATION RATE: 18 BRPM | BODY MASS INDEX: 25.69 KG/M2 | SYSTOLIC BLOOD PRESSURE: 108 MMHG | HEIGHT: 63 IN

## 2023-03-03 DIAGNOSIS — J02.9 PHARYNGITIS, UNSPECIFIED ETIOLOGY: ICD-10-CM

## 2023-03-03 DIAGNOSIS — K21.00 GASTROESOPHAGEAL REFLUX DISEASE WITH ESOPHAGITIS WITHOUT HEMORRHAGE: ICD-10-CM

## 2023-03-03 PROCEDURE — 87070 CULTURE OTHR SPECIMN AEROBIC: CPT

## 2023-03-03 PROCEDURE — 99214 OFFICE O/P EST MOD 30 MIN: CPT

## 2023-03-03 RX ORDER — OMEPRAZOLE 20 MG/1
20 CAPSULE, DELAYED RELEASE ORAL DAILY
Qty: 30 CAPSULE | Refills: 0 | Status: SHIPPED | OUTPATIENT
Start: 2023-03-03

## 2023-03-03 ASSESSMENT — FIBROSIS 4 INDEX: FIB4 SCORE: 1.49

## 2023-03-03 NOTE — PROGRESS NOTES
Subjective:   Sena Shepherd is a 67 y.o. female who presents for Pharyngitis (X 3 months, sinus pressure, sore throat, chills)      HPI:    Patient returns to urgent care with concerns of persistent sore throat, sinus pressure. She was seen by the ER and UC on several occassions for the same complaints. Patient has been treated for bacterial infections and viral/allergy etiologies. She was last seen by this provider on 02/20/23 for bacterial sinusitis. Patient reports the doxycycline, OCS, pain medications and saline gel have provided mild relief of symptoms. Patient states she is no longer experiencing epistaxis since her last visit. She however reports burning pain to the throat. Unable to establish with ENT due to insurance. She has pmh of GERD, does not currently use any medications for GERD. Endorses snoring. Not currently established with PCP.   Denies fever, night sweats, nausea, vomiting, diarrhea. Reports chills.  Denies CP, SOB, dizziness, palpitations, leg swelling, cough  She reports she is using hot water and baking soda to irrigate her sinuses.     ROS As above in HPI    Medications:    Current Outpatient Medications on File Prior to Visit   Medication Sig Dispense Refill    methylPREDNISolone (MEDROL DOSEPAK) 4 MG Tablet Therapy Pack Follow schedule on package instructions. 1 Each 0    indomethacin SR (INDOCIN SR) 75 MG Cap CR Take 1 Capsule by mouth every day. 30 Capsule 0    atorvastatin (LIPITOR) 20 MG Tab Take 1 Tablet by mouth every evening. 90 Tablet 0    doxepin (SINEQUAN) 10 MG Cap Take 1 Capsule by mouth every evening. 30 Capsule 0    fluticasone (FLONASE) 50 MCG/ACT nasal spray ADMINISTER 2 SPRAYS INTO AFFECTED NOSTRIL(S) 2 TIMES A DAY FOR 30 DAYS. 16 mL 2    mirtazapine (REMERON) 30 MG Tab tablet TAKE 1 TABLET BY MOUTH EVERY DAY AT NIGHT      PARoxetine (PAXIL) 30 MG Tab Take 30 mg by mouth every day.      lamoTRIgine (LAMICTAL) 100 MG Tab Take 100 mg by mouth at bedtime.  3  "   Saline Gel Apply thin layer to affected nares as needed. (Patient not taking: Reported on 3/3/2023) 14.1 g 0    montelukast (SINGULAIR) 10 MG Tab Take 1 Tablet by mouth every day. (Patient not taking: Reported on 3/3/2023) 50 Tablet 0    pramox-PE-glycerin-petrolatum (HEMORRHOIDAL) 1-0.25-14.4-15 % Cream Insert 1 g into the rectum at bedtime. (Patient not taking: Reported on 3/3/2023) 51 g 1    lamoTRIgine (LAMICTAL) 25 MG Tab Take 25 mg by mouth every day. (Patient not taking: Reported on 1/10/2023)       No current facility-administered medications on file prior to visit.        Allergies:   Nkda [no known drug allergy]    Problem List:   Patient Active Problem List   Diagnosis    Depression with somatization    Hyperthyroidism    Vitamin D deficiency    Sore throat    Hyperlipidemia    Elevated glucose    Goiter    Severe anxiety with panic    Moderate episode of recurrent major depressive disorder (HCC)        Surgical History:  Past Surgical History:   Procedure Laterality Date    CHOLECYSTECTOMY  2/2016    CHOLECYSTECTOMY         Past Social Hx:   Social History     Tobacco Use    Smoking status: Never    Smokeless tobacco: Never   Vaping Use    Vaping Use: Never used   Substance Use Topics    Alcohol use: No    Drug use: No          Problem list, medications, and allergies reviewed by myself today in Epic.     Objective:     /78   Pulse 78   Temp 36.7 °C (98.1 °F) (Temporal)   Resp 18   Ht 1.6 m (5' 3\")   Wt 65.8 kg (145 lb)   SpO2 98%   BMI 25.69 kg/m²     Physical Exam  Vitals and nursing note reviewed.   Constitutional:       General: She is not in acute distress.     Appearance: Normal appearance. She is not ill-appearing or diaphoretic.   HENT:      Head: Normocephalic and atraumatic.      Right Ear: Tympanic membrane and ear canal normal.      Left Ear: Tympanic membrane and ear canal normal.      Nose: Mucosal edema present. No congestion or rhinorrhea.      Right Sinus: No maxillary " sinus tenderness or frontal sinus tenderness.      Left Sinus: No maxillary sinus tenderness or frontal sinus tenderness.      Mouth/Throat:      Pharynx: Uvula midline. No pharyngeal swelling, oropharyngeal exudate or posterior oropharyngeal erythema.      Tonsils: No tonsillar exudate.   Eyes:      Conjunctiva/sclera: Conjunctivae normal.   Neck:      Trachea: Trachea and phonation normal.      Meningeal: Kernig's sign absent.   Cardiovascular:      Rate and Rhythm: Normal rate and regular rhythm.      Heart sounds: Normal heart sounds. No murmur heard.    No friction rub. No gallop.   Pulmonary:      Effort: Pulmonary effort is normal. No respiratory distress.      Breath sounds: Normal breath sounds. No stridor. No decreased breath sounds, wheezing, rhonchi or rales.   Chest:      Chest wall: No tenderness.   Abdominal:      General: Abdomen is flat. Bowel sounds are normal. There is no distension.      Palpations: Abdomen is soft. There is no hepatomegaly, splenomegaly, mass or pulsatile mass.      Tenderness: There is no abdominal tenderness. There is no right CVA tenderness, left CVA tenderness, guarding or rebound. Negative signs include Rovsing's sign and McBurney's sign.      Hernia: No hernia is present.   Musculoskeletal:         General: Normal range of motion.   Lymphadenopathy:      Cervical: No cervical adenopathy.   Skin:     General: Skin is warm and dry.      Capillary Refill: Capillary refill takes less than 2 seconds.      Findings: No rash.   Neurological:      Mental Status: She is alert and oriented to person, place, and time.   Psychiatric:         Mood and Affect: Mood is anxious.       Assessment/Plan:       Diagnosis and associated orders:   1. Pharyngitis, unspecified etiology  - CULTURE THROAT; Future  - Referral to establish with [facility] PCP    2. Gastroesophageal reflux disease with esophagitis without hemorrhage  - omeprazole (PRILOSEC) 20 MG delayed-release capsule; Take 1 Capsule by  mouth every day.  Dispense: 30 Capsule; Refill: 0        Comments/MDM:     GI cocktail administered in office with alleviation of pain. Patient instructed to restart omeprazole. Throat culture obtained.  Referral to establish with PCP placed. Patient also given contact information for [facility].   Will notify patient of throat culture results when available and modify plan as needed.   Patient instructed to stop using hot water and baking soda to irrigate her sinuses.  Patient verbalized understanding and consented to plan of care.           Please note that this dictation was created using voice recognition software. I have made a reasonable attempt to correct obvious errors, but I expect that there are errors of grammar and possibly content that I did not discover before finalizing the note.    This note was electronically signed by Letty Lee DNP

## 2023-03-05 LAB
BACTERIA SPEC RESP CULT: NORMAL
SIGNIFICANT IND 70042: NORMAL
SITE SITE: NORMAL
SOURCE SOURCE: NORMAL

## 2023-03-09 ENCOUNTER — OFFICE VISIT (OUTPATIENT)
Dept: INTERNAL MEDICINE | Facility: OTHER | Age: 68
End: 2023-03-09
Payer: MEDICAID

## 2023-03-09 VITALS
DIASTOLIC BLOOD PRESSURE: 91 MMHG | HEART RATE: 76 BPM | WEIGHT: 150 LBS | OXYGEN SATURATION: 98 % | TEMPERATURE: 96.8 F | BODY MASS INDEX: 26.58 KG/M2 | SYSTOLIC BLOOD PRESSURE: 166 MMHG | HEIGHT: 63 IN

## 2023-03-09 DIAGNOSIS — J32.9 CHRONIC RHINOSINUSITIS: ICD-10-CM

## 2023-03-09 DIAGNOSIS — Z78.9 LANGUAGE BARRIER: ICD-10-CM

## 2023-03-09 DIAGNOSIS — F41.0 SEVERE ANXIETY WITH PANIC: ICD-10-CM

## 2023-03-09 PROCEDURE — 99214 OFFICE O/P EST MOD 30 MIN: CPT | Performed by: INTERNAL MEDICINE

## 2023-03-09 RX ORDER — KETOROLAC TROMETHAMINE 30 MG/ML
INJECTION, SOLUTION INTRAMUSCULAR; INTRAVENOUS
COMMUNITY
Start: 2023-02-06 | End: 2023-03-16

## 2023-03-09 RX ORDER — DIAZEPAM 5 MG/1
5 TABLET ORAL 3 TIMES DAILY
COMMUNITY
Start: 2023-02-06

## 2023-03-09 RX ORDER — AZELASTINE 1 MG/ML
1 SPRAY, METERED NASAL 2 TIMES DAILY
Qty: 30 ML | Refills: 1 | Status: SHIPPED | OUTPATIENT
Start: 2023-03-09 | End: 2023-03-31

## 2023-03-09 ASSESSMENT — FIBROSIS 4 INDEX: FIB4 SCORE: 1.49

## 2023-03-09 NOTE — PROGRESS NOTES
Chief Complaint   Patient presents with    Panic Attack     Shaking and cold feeling     Pharyngitis       HPI: Sena Shepherd is a 67 y.o. female with past medical history as below  who presented to the clinic for the following.    #Chronic intermittent sore throat nasal pressure and burning.   #Panic attacks.   -ongoing for > 3 months.  -Patient states that the panic attacks are not her concerns for today. They seem better controlled and patient is seeing a psychiatrist for that.   -Patient is worried that she has having nasal , sinus and throat pain. On exam there is mild erythema of posterior pharynx, no exudates, nasal turbinates are very erythematous. Patient was started on fluticasone and montelukast on the previous visit and was seen in the urgent care twice following that. Patient was prescribed doxycycline and medrol dose pac for bacterial sinusitis and patient states that there was only mild relief with that. Fluticasone nasal spray was discontinued as patent was having epistaxis and dry nose.   An Ent referral was also placed during that visit. Patient was seen again in urgent care where she was instructed to stop nasal lavage, a throat culture was ordered and patient was started on omeprazole.   Patient was also given Gi cocktail. Patient reports that the GI cocktail may have helped her a little bit, however she is not like the feeling of numbness it created. Patient also takes NSAIds and tylenol for pain   Patient continues to use table salt and baking soda every morning for nasal lavage.   CT scan shows small mucosal opacified right middle turbinate nicolette bullosa.   Thorat culture collected was negative    Additionally patient brings up symptoms of feeling cold described as a feeling that starts from inside and travels up to her nose and throat followed by pain in that area and then followed by sweating.   Patient has had hot flashes before, however it is usually not associated  with the nasal, sinus and sore thorat problems. TSH wnl.        Patient additional wants to establish with new pcp.     Past medical history of    #Depression with somatization  #Severe anxiety with panic-on lamotrigine, doxepin mirtazapine, paroxetine  #MDD  #history of Hyperthyroidism.  #Vitamin D deficiency.  #Hyperlipidemia.,  Last lipid panel from January 2023 with total cholesterol of 291/triglyceride of 135/HDL of 55/LDL of 204/211-On atorvastatin 20 mg  #Elevated glucose.,  Prediabetes with A1c of 5.9 in July 2019  #chronic intermittent Sore throat  #Goiter  #Fibroids.  #Liver cysts noted on CT scan abdomen pelvis done in April 2017  #Echocardiogram in 2014 showed ascending aorta with linear density?        Patient Active Problem List    Diagnosis Date Noted    Severe anxiety with panic 09/27/2017    Moderate episode of recurrent major depressive disorder (HCC) 09/27/2017    Elevated glucose 11/13/2015    Goiter 11/13/2015    Hyperlipidemia 09/08/2015    Sore throat 10/23/2014    Vitamin D deficiency 07/13/2014    Hyperthyroidism 10/04/2012    Depression with somatization 11/09/2009       Current Outpatient Medications   Medication Sig Dispense Refill    diazePAM (VALIUM) 5 MG Tab Take 5 mg by mouth 3 times a day.      ketorolac (TORADOL) 30 MG/ML Solution       azelastine (ASTELIN) 137 MCG/SPRAY nasal spray Administer 1 Spray into affected nostril(S) 2 times a day. 30 mL 1    omeprazole (PRILOSEC) 20 MG delayed-release capsule Take 1 Capsule by mouth every day. 30 Capsule 0    atorvastatin (LIPITOR) 20 MG Tab Take 1 Tablet by mouth every evening. 90 Tablet 0    doxepin (SINEQUAN) 10 MG Cap Take 1 Capsule by mouth every evening. 30 Capsule 0    fluticasone (FLONASE) 50 MCG/ACT nasal spray ADMINISTER 2 SPRAYS INTO AFFECTED NOSTRIL(S) 2 TIMES A DAY FOR 30 DAYS. 16 mL 2    PARoxetine (PAXIL) 30 MG Tab Take 30 mg by mouth every day.      methylPREDNISolone (MEDROL DOSEPAK) 4 MG Tablet Therapy Pack Follow  "schedule on package instructions. (Patient not taking: Reported on 3/9/2023) 1 Each 0    Saline Gel Apply thin layer to affected nares as needed. (Patient not taking: Reported on 3/3/2023) 14.1 g 0    indomethacin SR (INDOCIN SR) 75 MG Cap CR Take 1 Capsule by mouth every day. (Patient not taking: Reported on 3/9/2023) 30 Capsule 0    montelukast (SINGULAIR) 10 MG Tab Take 1 Tablet by mouth every day. (Patient not taking: Reported on 3/3/2023) 50 Tablet 0    pramox-PE-glycerin-petrolatum (HEMORRHOIDAL) 1-0.25-14.4-15 % Cream Insert 1 g into the rectum at bedtime. (Patient not taking: Reported on 3/3/2023) 51 g 1    lamoTRIgine (LAMICTAL) 25 MG Tab Take 25 mg by mouth every day. (Patient not taking: Reported on 1/10/2023)      mirtazapine (REMERON) 30 MG Tab tablet TAKE 1 TABLET BY MOUTH EVERY DAY AT NIGHT (Patient not taking: Reported on 3/9/2023)      lamoTRIgine (LAMICTAL) 100 MG Tab Take 100 mg by mouth at bedtime. (Patient not taking: Reported on 3/9/2023)  3     No current facility-administered medications for this visit.       ROS: As per HPI. Additional pertinent systems as noted below.  Constitutional:  Negative for fever, positive for feeling cold intermittently followed by sweating   HENT: positive as in hpi   Eyes:  Negative for blurred vision and double vision   Cardiovascular:  Negative for chest pain, palpitations   Respiratory:  Negative for cough, sputum production, shortness of breath       BP (!) 166/91 (BP Location: Right arm, Patient Position: Sitting, BP Cuff Size: Adult)   Pulse 76   Temp 36 °C (96.8 °F) (Temporal)   Ht 1.6 m (5' 3\")   Wt 68 kg (150 lb)   SpO2 98%   BMI 26.57 kg/m²     Physical Exam   Constitutional:  Well developed, well nourished. Not in acute distress   HENT:  Normocephalic, Atraumatic, Oropharynx is pink and moist. No oral exudates, Nose - marked erythema in nasal turbinates . nO polyps appreciated   Eyes:  EOMI, Conjunctiva normal, No discharge. PERRLA  "   Cardiovascular:  Regular rate and rhythm, No pedal edema, Intact distal pulses   Respiratory: Clear to auscultation bilaterally, No use of accessory muscles   Note: I have reviewed all pertinent labs and diagnostic tests associated with this visit with specific comments listed under the assessment and plan below    Assessment and Plan    1. Chronic rhinosinusitis  Adding azelastin spray.   Patient given instructions on correct usage of nasal lavage. Patient also instructed to try fluticasone after lavage and to stop it if there are issues with dry nose or epistaxis again.   Patient also instructed to stop NSAIDs and see if that helps.   Patient also given the option to stop montelukast if that is worsening her mood symptoms.   Patient also instructed to continue omeprazole  Will place a new ENT referral    - azelastine (ASTELIN) 137 MCG/SPRAY nasal spray; Administer 1 Spray into affected nostril(S) 2 times a day.  Dispense: 30 mL; Refill: 1    2. Severe anxiety with panic  Patient is currently following with psychiatrist and feels that it is adequately controlled except when it is triggered by nose and throat problems.   Feeling cold intermittently may be associated with a panic attack , will follow up closely.     An extensive amount of time was spent with patient due to language barriers and her preference to not speak with interpretor majority of the time. Patient politely points out that she cannot speak english or U[place] fluently and apologized. Patient reassured. Patient prefers written material to be in English     Followup: Return in about 1 month (around 4/9/2023).        Signed by: Bill Monroe M.D.

## 2023-03-09 NOTE — PATIENT INSTRUCTIONS
try to follow the following instructions for nasal lavage     The recipe   Use a one-quart glass jar that is thoroughly cleansed.   You may use a large medical syringe (30 cc), water pick with an irrigation tip (preferred method), squeeze bottle, or Neti pot. Do not use a baby bulb syringe. The syringe or pick should be sterilized frequently or replaced every 2 to 3 weeks to avoid contamination and infection.   Fill with water that has been distilled, previously boiled, or otherwise sterilized. Plain tap water is not recommended, because it is not necessarily sterile.   Add 1 to 1½ heaping teaspoons of pickling/deb salt. Do not use table salt, because it contains a large number of additives.   Add 1 teaspoon of baking soda (pure bicarbonate).   Mix ingredients together and store at room temperature. Discard after 1 week.   You may also make up a solution from premixed packets that are commercially prepared specifically for nasal irrigation.   The instructions   Irrigate your nose with saline 1 to 2 times per day.   If you have been told to use nasal medication, you should always use your saline solution first. The nasal medication is much more effective when sprayed onto clean nasal membranes, and the spray will reach deeper into the nose.   Pour the amount of fluid you plan to use into a clean bowl. Do not put your used syringe back into the storage container, because it contaminates your solution.   You may warm the solution slightly in the microwave, but be sure that the solution is not hot.   Bend over the sink (some people do this in the shower), and squirt the solution into each side of your nose, aiming the stream toward the back of your head, not the top of your head. The solution should flow into one nostril and out of the other, but it will not harm you if you swallow a little.   Some people experience a little burning sensation the first few times that they use buffered saline solution, but this  usually goes away after they adapt to it.        You may use the fluticasone nasal spray after the above lavage so that your nose is moist . If you are having issues with dry nose or bleeding, then you can stop it.     Do the lavage less frequently than you are to see if that is helping.   Do not use baking soda    You may continue montelukast- it does sometimes affect mood , so if you dint have a lot of benefit and if you are having side effect then you can stop it.     I am also prescribing a new spray called azelastine .     Stop ibuprofen for 7 days and see if there is any relief.     You may use tylenol.     I also want you to try omeprazole daily on empty stomach. Take it 20-30 minutes before breakfast     Follow up with pcp in 1 month

## 2023-03-11 PROBLEM — Z60.3 LANGUAGE BARRIER: Status: ACTIVE | Noted: 2023-03-11

## 2023-03-11 PROBLEM — Z75.8 LANGUAGE BARRIER: Status: ACTIVE | Noted: 2023-03-11

## 2023-03-11 PROBLEM — Z78.9 LANGUAGE BARRIER: Status: ACTIVE | Noted: 2023-03-11

## 2023-03-13 ENCOUNTER — OFFICE VISIT (OUTPATIENT)
Dept: INTERNAL MEDICINE | Facility: OTHER | Age: 68
End: 2023-03-13
Payer: MEDICAID

## 2023-03-13 VITALS
TEMPERATURE: 98.5 F | DIASTOLIC BLOOD PRESSURE: 80 MMHG | HEART RATE: 82 BPM | WEIGHT: 148 LBS | OXYGEN SATURATION: 98 % | HEIGHT: 63 IN | SYSTOLIC BLOOD PRESSURE: 153 MMHG | BODY MASS INDEX: 26.22 KG/M2

## 2023-03-13 DIAGNOSIS — Z00.00 ENCOUNTER FOR ROUTINE ADULT HEALTH EXAMINATION WITHOUT ABNORMAL FINDINGS: ICD-10-CM

## 2023-03-13 DIAGNOSIS — Z13.820 SCREENING FOR OSTEOPOROSIS: ICD-10-CM

## 2023-03-13 DIAGNOSIS — R68.89 COLD FEELING: ICD-10-CM

## 2023-03-13 DIAGNOSIS — J32.9 CHRONIC RHINOSINUSITIS: ICD-10-CM

## 2023-03-13 DIAGNOSIS — Z23 ENCOUNTER FOR ADMINISTRATION OF VACCINE: ICD-10-CM

## 2023-03-13 PROCEDURE — 99214 OFFICE O/P EST MOD 30 MIN: CPT | Mod: 25 | Performed by: INTERNAL MEDICINE

## 2023-03-13 RX ORDER — FLUTICASONE PROPIONATE 50 MCG
1 SPRAY, SUSPENSION (ML) NASAL 2 TIMES DAILY
Qty: 11.1 ML | Refills: 1 | Status: SHIPPED | OUTPATIENT
Start: 2023-03-13

## 2023-03-13 ASSESSMENT — FIBROSIS 4 INDEX: FIB4 SCORE: 1.49

## 2023-03-13 NOTE — PROGRESS NOTES
Chief Complaint   Patient presents with    Follow-Up     Patients states she is doing a lot better        HPI: Sena Shepherd is a 67 y.o. female with past medical history as below  who presented to the clinic for the following.    #Chronic intermittent sore throat nasal pressure and burning.    -ongoing for > 3 months. Started on azelastine spray on the last visit- patient states that she feels much better. Continues to use nasal lavage , flonase and montelukast along with the azelastine.   -ENT referral is currently still processing     #Panic attacks , anxiety   -Patient is currently seeing psychiatrist for this .  -Have to address medications on the next visit - to confirm which one she is on and which one she is not on  -Doxepin refill was placed by Dr. Marion in our clinic and hence refill request has come to us- recommended that patient reach out to Psychiatrist for further refills on that one since she is on other medications that they are prescribing as well.     #Blood work  -Discussed all labs with patient  -Patient wants her hormones to be checked because of the abnormal sensation of cold she occassionally gets as described in my previous note. Patient is unable to fully describe it on this visit.   -States that she bought OTC progesterone from [employer] and has been applying it on her body with relief.   -Patient has had her hormones checked in the past by a physician and was told that she had low hormones.   -Looking back at chart patient has been on patched in 2009, however none since then.        Preventative health   -Patient is due her pneumonia shot - administered in clinic today   -Recommended COVID booster   -Due tdap vaccine this year - will administer on next visit.   -Uptodate on flu  -Dexa scan never done  Colocnoscopy  was recommended to be done every 5years, last one in 2016 - unclear- possibly with GI consultants    All Pap smears in the past were- normal    Mammogram - recently done at [facility]        Social History     Socioeconomic History    Marital status:    Tobacco Use    Smoking status: Never    Smokeless tobacco: Never   Vaping Use    Vaping Use: Never used   Substance and Sexual Activity    Alcohol use: No    Drug use: No    Sexual activity: Never     Comment: no sexual activity x 10 years              Past medical history of    #Depression with somatization  #Severe anxiety with panic-on lamotrigine, doxepin mirtazapine, paroxetine  #MDD  #history of Hyperthyroidism.  #Vitamin D deficiency.  #Hyperlipidemia.,  Last lipid panel from January 2023 with total cholesterol of 291/triglyceride of 135/HDL of 55/LDL of 204/211-On atorvastatin 20 mg  #Elevated glucose.,  Prediabetes with A1c of 5.9 in July 2019  #Goiter  #Fibroids.  #Liver cysts noted on CT scan abdomen pelvis done in April 2017  #Echocardiogram in 2014 showed ascending aorta with linear density?            Patient Active Problem List    Diagnosis Date Noted    Language barrier 03/11/2023    Severe anxiety with panic 09/27/2017    Moderate episode of recurrent major depressive disorder (HCC) 09/27/2017    Elevated glucose 11/13/2015    Goiter 11/13/2015    Hyperlipidemia 09/08/2015    Sore throat 10/23/2014    Vitamin D deficiency 07/13/2014    Hyperthyroidism 10/04/2012    Depression with somatization 11/09/2009       Current Outpatient Medications   Medication Sig Dispense Refill    fluticasone (FLONASE) 50 MCG/ACT nasal spray Administer 1 Spray into affected nostril(S) 2 times a day. 11.1 mL 1    montelukast (SINGULAIR) 10 MG Tab TAKE 1 TABLET BY MOUTH EVERY DAY 90 Tablet 1    diazePAM (VALIUM) 5 MG Tab Take 5 mg by mouth 3 times a day.      ketorolac (TORADOL) 30 MG/ML Solution  (Patient not taking: Reported on 3/13/2023)      azelastine (ASTELIN) 137 MCG/SPRAY nasal spray Administer 1 Spray into affected nostril(S) 2 times a day. 30 mL 1    omeprazole (PRILOSEC) 20 MG delayed-release capsule  "Take 1 Capsule by mouth every day. 30 Capsule 0    methylPREDNISolone (MEDROL DOSEPAK) 4 MG Tablet Therapy Pack Follow schedule on package instructions. (Patient not taking: Reported on 3/9/2023) 1 Each 0    Saline Gel Apply thin layer to affected nares as needed. (Patient not taking: Reported on 3/3/2023) 14.1 g 0    indomethacin SR (INDOCIN SR) 75 MG Cap CR Take 1 Capsule by mouth every day. (Patient not taking: Reported on 3/9/2023) 30 Capsule 0    atorvastatin (LIPITOR) 20 MG Tab Take 1 Tablet by mouth every evening. 90 Tablet 0    doxepin (SINEQUAN) 10 MG Cap Take 1 Capsule by mouth every evening. 30 Capsule 0    pramox-PE-glycerin-petrolatum (HEMORRHOIDAL) 1-0.25-14.4-15 % Cream Insert 1 g into the rectum at bedtime. (Patient not taking: Reported on 3/3/2023) 51 g 1    lamoTRIgine (LAMICTAL) 25 MG Tab Take 25 mg by mouth every day. (Patient not taking: Reported on 1/10/2023)      mirtazapine (REMERON) 30 MG Tab tablet TAKE 1 TABLET BY MOUTH EVERY DAY AT NIGHT (Patient not taking: Reported on 3/9/2023)      PARoxetine (PAXIL) 30 MG Tab Take 30 mg by mouth every day.      lamoTRIgine (LAMICTAL) 100 MG Tab Take 100 mg by mouth at bedtime. (Patient not taking: Reported on 3/9/2023)  3     No current facility-administered medications for this visit.       ROS: As per HPI. Additional pertinent systems as noted below.  Constitutional:  Negative for fever, positive for feeling cold intermittently followed by sweating   HENT: positive as in hpi   Eyes:  Negative for blurred vision and double vision   Cardiovascular:  Negative for chest pain, palpitations   Respiratory:  Negative for cough, sputum production, shortness of breath       BP (!) 153/80 (BP Location: Left arm, Patient Position: Sitting, BP Cuff Size: Adult)   Pulse 82   Temp 36.9 °C (98.5 °F) (Temporal)   Ht 1.6 m (5' 3\")   Wt 67.1 kg (148 lb)   SpO2 98%   BMI 26.22 kg/m²     Physical Exam   Constitutional:  Well developed, well nourished. Not in acute " distress   HENT:  Normocephalic, Atraumatic, Oropharynx is pink and moist. No oral exudates, Nose - marked erythema in nasal turbinates . nO polyps appreciated   Cardiovascular:  Regular rate and rhythm, No pedal edema, Intact distal pulses   Respiratory: Clear to auscultation bilaterally, No use of accessory muscles   Note: I have reviewed all pertinent labs and diagnostic tests associated with this visit with specific comments listed under the assessment and plan below    Assessment and Plan    1. Chronic rhinosinusitis  -Continue currentl manageemnt with montelukast, azelastine, flonase and nasal lavage     - fluticasone (FLONASE) 50 MCG/ACT nasal spray; Administer 1 Spray into affected nostril(S) 2 times a day.  Dispense: 11.1 mL; Refill: 1    2. Encounter for administration of vaccine    - Pneumococcal Conjugate Vaccine 20-Valent (19 yrs+)    3. Screening for osteoporosis    - DS-BONE DENSITY STUDY (DEXA); Future    4. Encounter for routine adult health examination without abnormal findings  -Patient is due her pneumonia shot - administered in clinic today   -Recommended COVID booster   -Due tdap vaccine this year - will administer on next visit.   -Uptodate on flu  -Dexa scan never done- ordered today   Colocnoscopy  was recommended to be done every 5years, last one in 2016 - unclear- possibly with GI consultants    All Pap smears in the past were- normal   Mammogram - recently done at [facility]- will try to obtain report      5. Cold feeling  Patient is unable to fully describe, partially sounds like a hot flash- discussed non hormonal options for that, however patient is insistent on hormone check. Son present with patient today tells me that he will discuss with mom regarding risks vs benefits we talked about on this visit including risk of VTE with long term use of hormonal formulations when other conservative measures are available and also given that the symptoms are undefined .  We will pursue obgyn referral  for hormonal therpay if patient choses that.   Recommended OTC water based lubricant jelly for vaginal dryness     Followup: Return in about 3 months (around 6/13/2023).        Signed by: Bill Monroe M.D.

## 2023-03-16 PROBLEM — R68.89 COLD FEELING: Status: ACTIVE | Noted: 2023-03-16

## 2023-03-16 PROCEDURE — 90471 IMMUNIZATION ADMIN: CPT | Performed by: INTERNAL MEDICINE

## 2023-03-16 PROCEDURE — 90677 PCV20 VACCINE IM: CPT | Performed by: INTERNAL MEDICINE

## 2023-03-27 ENCOUNTER — APPOINTMENT (OUTPATIENT)
Dept: INTERNAL MEDICINE | Facility: OTHER | Age: 68
End: 2023-03-27
Payer: MEDICAID

## 2023-03-31 DIAGNOSIS — J32.9 CHRONIC RHINOSINUSITIS: ICD-10-CM

## 2023-03-31 RX ORDER — AZELASTINE HYDROCHLORIDE 137 UG/1
SPRAY, METERED NASAL
Qty: 30 ML | Refills: 1 | Status: SHIPPED | OUTPATIENT
Start: 2023-03-31 | End: 2023-04-17

## 2023-03-31 NOTE — TELEPHONE ENCOUNTER
Received request via: Pharmacy    Was the patient seen in the last year in this department? Yes    Does the patient have an active prescription (recently filled or refills available) for medication(s) requested? No    Does the patient have correction Plus and need 100 day supply (blood pressure, diabetes and cholesterol meds only)? Patient does not have SCP

## 2023-04-16 DIAGNOSIS — J32.9 CHRONIC RHINOSINUSITIS: ICD-10-CM

## 2023-04-17 RX ORDER — AZELASTINE HYDROCHLORIDE 137 UG/1
1 SPRAY, METERED NASAL 2 TIMES DAILY
Qty: 90 ML | Refills: 1 | Status: SHIPPED | OUTPATIENT
Start: 2023-04-17

## 2023-04-24 ENCOUNTER — APPOINTMENT (OUTPATIENT)
Dept: RADIOLOGY | Facility: MEDICAL CENTER | Age: 68
End: 2023-04-24
Payer: MEDICAID

## 2023-04-24 ENCOUNTER — HOSPITAL ENCOUNTER (OUTPATIENT)
Dept: RADIOLOGY | Facility: MEDICAL CENTER | Age: 68
End: 2023-04-24
Attending: INTERNAL MEDICINE
Payer: MEDICAID

## 2023-04-24 DIAGNOSIS — Z13.820 SCREENING FOR OSTEOPOROSIS: ICD-10-CM

## 2023-04-24 PROCEDURE — 77080 DXA BONE DENSITY AXIAL: CPT

## 2023-05-18 ENCOUNTER — OFFICE VISIT (OUTPATIENT)
Dept: URGENT CARE | Facility: CLINIC | Age: 68
End: 2023-05-18
Payer: MEDICAID

## 2023-05-18 VITALS
WEIGHT: 147.2 LBS | HEART RATE: 68 BPM | DIASTOLIC BLOOD PRESSURE: 80 MMHG | OXYGEN SATURATION: 96 % | BODY MASS INDEX: 26.08 KG/M2 | HEIGHT: 63 IN | TEMPERATURE: 98.6 F | SYSTOLIC BLOOD PRESSURE: 140 MMHG | RESPIRATION RATE: 16 BRPM

## 2023-05-18 DIAGNOSIS — J02.9 PHARYNGITIS, UNSPECIFIED ETIOLOGY: ICD-10-CM

## 2023-05-18 DIAGNOSIS — J32.9 RHINOSINUSITIS: ICD-10-CM

## 2023-05-18 LAB
FLUAV RNA SPEC QL NAA+PROBE: NEGATIVE
FLUBV RNA SPEC QL NAA+PROBE: NEGATIVE
RSV RNA SPEC QL NAA+PROBE: NEGATIVE
S PYO DNA SPEC NAA+PROBE: NOT DETECTED
SARS-COV-2 RNA RESP QL NAA+PROBE: NEGATIVE

## 2023-05-18 PROCEDURE — 3077F SYST BP >= 140 MM HG: CPT

## 2023-05-18 PROCEDURE — 87651 STREP A DNA AMP PROBE: CPT

## 2023-05-18 PROCEDURE — 99213 OFFICE O/P EST LOW 20 MIN: CPT

## 2023-05-18 PROCEDURE — 0241U POCT CEPHEID COV-2, FLU A/B, RSV - PCR: CPT

## 2023-05-18 PROCEDURE — 3079F DIAST BP 80-89 MM HG: CPT

## 2023-05-18 RX ORDER — HYDROXYZINE HYDROCHLORIDE 25 MG/1
25 TABLET, FILM COATED ORAL
Qty: 30 TABLET | Refills: 1 | Status: SHIPPED | OUTPATIENT
Start: 2023-05-18 | End: 2023-06-17

## 2023-05-18 NOTE — PROGRESS NOTES
"Subjective:   Sena Shepherd is a 68 y.o. female who presents for Seasonal Allergies (The patient stated her head and skin feels \"pinchy\") and Cough (Sore throat and cough with green mucous)      HPI:    Patient presents urgent care with concerns of seasonal allergies.  Orts rhinorrhea, nasal congestion, cough, sneezing, itchy eyes  Reports her skin feels \" pinchy\"  Denies fever, chills, body aches  Denies ear pain, reports sore throat  States she has green mucus in her throat  Tolerating diet, reports normal urinary output  Has tried over-the-counter antihistamines with mild improvement in her symptoms      ROS As above in HPI    Medications:    Current Outpatient Medications on File Prior to Visit   Medication Sig Dispense Refill    Azelastine HCl 137 MCG/SPRAY Solution Administer 1 Spray into affected nostril(S) 2 times a day. ADMINISTER 1 SPRAY INTO AFFECTED NOSTRIL(S) 2 TIMES A DAY . Please dispense for 90 days with one refill 90 mL 1    montelukast (SINGULAIR) 10 MG Tab TAKE 1 TABLET BY MOUTH EVERY DAY 90 Tablet 1    fluticasone (FLONASE) 50 MCG/ACT nasal spray Administer 1 Spray into affected nostril(S) 2 times a day. 11.1 mL 1    diazePAM (VALIUM) 5 MG Tab Take 5 mg by mouth 3 times a day.      omeprazole (PRILOSEC) 20 MG delayed-release capsule Take 1 Capsule by mouth every day. 30 Capsule 0    atorvastatin (LIPITOR) 20 MG Tab Take 1 Tablet by mouth every evening. 90 Tablet 0    doxepin (SINEQUAN) 10 MG Cap Take 1 Capsule by mouth every evening. 30 Capsule 0    lamoTRIgine (LAMICTAL) 25 MG Tab Take 25 mg by mouth every day.      mirtazapine (REMERON) 30 MG Tab tablet       PARoxetine (PAXIL) 30 MG Tab Take 30 mg by mouth every day.      lamoTRIgine (LAMICTAL) 100 MG Tab Take 100 mg by mouth at bedtime.  3     No current facility-administered medications on file prior to visit.        Allergies:   Nkda [no known drug allergy]    Problem List:   Patient Active Problem List   Diagnosis    " "Depression with somatization    Hyperthyroidism    Vitamin D deficiency    Sore throat    Hyperlipidemia    Elevated glucose    Goiter    Severe anxiety with panic    Moderate episode of recurrent major depressive disorder (HCC)    Language barrier    Cold feeling        Surgical History:  Past Surgical History:   Procedure Laterality Date    CHOLECYSTECTOMY  2/2016    CHOLECYSTECTOMY         Past Social Hx:   Social History     Tobacco Use    Smoking status: Never     Passive exposure: Never    Smokeless tobacco: Never   Vaping Use    Vaping Use: Never used   Substance Use Topics    Alcohol use: Never    Drug use: Never          Problem list, medications, and allergies reviewed by myself today in Epic.     Objective:     BP (!) 140/80 (BP Location: Left arm, Patient Position: Sitting, BP Cuff Size: Adult)   Pulse 68   Temp 37 °C (98.6 °F) (Temporal)   Resp 16   Ht 1.6 m (5' 3\")   Wt 66.8 kg (147 lb 3.2 oz)   SpO2 96%   BMI 26.08 kg/m²     Physical Exam  Vitals and nursing note reviewed.   Constitutional:       Appearance: Normal appearance.   HENT:      Head: Normocephalic and atraumatic.      Right Ear: Tympanic membrane and ear canal normal.      Left Ear: Tympanic membrane and ear canal normal.      Nose: Congestion and rhinorrhea present. Rhinorrhea is clear.      Right Sinus: No maxillary sinus tenderness or frontal sinus tenderness.      Left Sinus: No maxillary sinus tenderness or frontal sinus tenderness.      Mouth/Throat:      Mouth: Mucous membranes are moist.      Pharynx: Oropharynx is clear. Uvula midline. Posterior oropharyngeal erythema present. No pharyngeal swelling or oropharyngeal exudate.      Tonsils: No tonsillar exudate. 1+ on the right. 1+ on the left.   Eyes:      Conjunctiva/sclera: Conjunctivae normal.   Cardiovascular:      Rate and Rhythm: Normal rate and regular rhythm.      Heart sounds: Normal heart sounds.   Pulmonary:      Effort: Pulmonary effort is normal. No respiratory " distress.      Breath sounds: Normal breath sounds and air entry. No stridor. No wheezing, rhonchi or rales.   Chest:      Chest wall: No tenderness.   Abdominal:      General: Bowel sounds are normal.      Palpations: Abdomen is soft.   Skin:     General: Skin is warm and dry.      Capillary Refill: Capillary refill takes less than 2 seconds.      Findings: No rash.   Neurological:      Mental Status: She is alert and oriented to person, place, and time.         Assessment/Plan:     Diagnosis and associated orders:   1. Rhinosinusitis  - hydrOXYzine HCl (ATARAX) 25 MG Tab; Take 1 Tablet by mouth at bedtime as needed for Itching (allergies) for up to 30 days.  Dispense: 30 Tablet; Refill: 1    2. Pharyngitis, unspecified etiology  - POCT CoV-2, Flu A/B, RSV by PCR  - POCT GROUP A STREP, PCR        Comments/MDM:     Negative POC strep, covid, influenza, RSV  Likely rhinosinusitis exacerbated by allergies.  Supportive measures encouraged: Histamines, Flonase, environmental allergen control, warm showers, nasal saline rinses, nasal saline spray, decongestant  Follow up with PCP advised     Please note that this dictation was created using voice recognition software. I have made a reasonable attempt to correct obvious errors, but I expect that there are errors of grammar and possibly content that I did not discover before finalizing the note.    This note was electronically signed by Letty Lee DNP

## 2023-06-23 ENCOUNTER — OFFICE VISIT (OUTPATIENT)
Dept: MEDICAL GROUP | Facility: CLINIC | Age: 68
End: 2023-06-23
Payer: MEDICAID

## 2023-06-23 DIAGNOSIS — R07.9 CHEST PAIN, UNSPECIFIED TYPE: ICD-10-CM

## 2023-06-23 DIAGNOSIS — Z11.3 ROUTINE SCREENING FOR STI (SEXUALLY TRANSMITTED INFECTION): ICD-10-CM

## 2023-06-23 DIAGNOSIS — T78.40XD ALLERGY, SUBSEQUENT ENCOUNTER: ICD-10-CM

## 2023-06-23 DIAGNOSIS — R68.89 COLD FEELING: ICD-10-CM

## 2023-06-23 PROBLEM — T78.40XA ALLERGIES: Status: ACTIVE | Noted: 2023-06-23

## 2023-06-23 PROCEDURE — 93000 ELECTROCARDIOGRAM COMPLETE: CPT | Performed by: STUDENT IN AN ORGANIZED HEALTH CARE EDUCATION/TRAINING PROGRAM

## 2023-06-23 PROCEDURE — 99214 OFFICE O/P EST MOD 30 MIN: CPT | Mod: GC | Performed by: STUDENT IN AN ORGANIZED HEALTH CARE EDUCATION/TRAINING PROGRAM

## 2023-06-23 RX ORDER — METHYLPREDNISOLONE 4 MG/1
TABLET ORAL
COMMUNITY
Start: 2023-04-28 | End: 2023-06-23

## 2023-06-23 RX ORDER — PAROXETINE HYDROCHLORIDE 40 MG/1
40 TABLET, FILM COATED ORAL
COMMUNITY
Start: 2023-04-24 | End: 2023-06-23

## 2023-06-23 RX ORDER — CETIRIZINE HYDROCHLORIDE 10 MG/1
10 TABLET ORAL
COMMUNITY
Start: 2023-04-28

## 2023-06-23 RX ORDER — AZITHROMYCIN 250 MG/1
TABLET, FILM COATED ORAL
COMMUNITY
Start: 2023-04-28 | End: 2023-06-23

## 2023-06-23 ASSESSMENT — FIBROSIS 4 INDEX: FIB4 SCORE: 1.44

## 2023-06-23 NOTE — ASSESSMENT & PLAN NOTE
Chronic.  Patient presents for evaluation of intermittent chills and sweating.  She has been evaluated in emergency department and with primary care physician, and has been essentially unremarkable.  Medical history and medications reviewed.  Discussed with patient that lamotrigine can cause symptoms, but unlikely given patient has been on medication for approximately 10 years.  Discussed with patient that symptoms may be related to vasomotor symptoms, but patient has been evaluated by OB/GYN already.  It would be unlikely given that patient has been postmenopausal for approximately 25 years.  -Recommend following up in clinic and keeping log of symptoms  -Continue to monitor symptoms

## 2023-06-23 NOTE — ASSESSMENT & PLAN NOTE
Patient reports experiencing right upper chest pain intermittently  when she experiences chills.  Overall, symptoms are atypical chest pain.  EKG essentially unremarkable.  She has been evaluated with labs which have been essentially unremarkable.  Vital signs stable.  Physical exam unremarkable.  Continue to monitor.

## 2023-06-23 NOTE — PROGRESS NOTES
"Subjective:     CC: Sweating    HPI:   Sena is established with [facility], and here  for evaluation of sweating and chills.    Sweating  Patient reports experiencing intermittent symptoms of sweating and chills  Symptoms are intermittent  Symptoms are worse with showering   She reports experiencing right upper chest and arm pain and shortness of breath with episodes of chills  She also has history of chronic sinusitis, has been evaluated by ENT  She plans to follow-up with ENT for further evaluation  Denies dysphagia, vaginal bleeding, melena, hematochezia, history of cancers, or pleuritic chest pain  Denies hemoptysis or coughing  She has been evaluated by PCP  and OB/GYN for evaluation of vasomotor symptoms  She is also been evaluated emergency department with labs and chest x-ray which have been essentially unremarkable      PMH: Depression, panic attacks, hyperlipidemia, and hypothyroidism  PSH: Gallbladder removal  MEDs: Reviewed  Allergies: No known drug allergies    Social History:   Work: Unemployed  Alcohol: None   Tobacco: None  Drugs: None  Relationships: .  Not currently sexually active.  No concerns for STI.   GYN history: Postmenopausal.  LMP at 41 years of age.  No gynecological surgeries.  Not on any hormonal contraception.    ROS: See HPI.     Objective:     Exam:  BP (P) 130/82 (BP Location: Right arm, Patient Position: Sitting, BP Cuff Size: Adult)   Pulse (P) 70   Temp (P) 36.2 °C (97.2 °F) (Temporal)   Ht (P) 1.6 m (5' 3\")   Wt (P) 64.8 kg (142 lb 14.4 oz)   SpO2 (P) 91%   BMI (P) 25.31 kg/m²  Body mass index is 25.31 kg/m² (pended).    Physical Exam:  General: Pt resting in NAD, cooperative   Skin:  No cyanosis or jaundice   HEENT: NC/AT. EOMI. No conjunctival injection or sclera icterus.  Minimal erythema, no exudates. Bilateral ears: canals clear, no erythema or effusion.   Lungs:  CTAB, good air movement. Non-labored.   Cardiovascular:  S1/S2 RRR   Abdomen:  Abdomen is soft, " non-tender, non-distended  CNS:  No gross focal neurologic deficits  Psych: Appropriate mood and affect       EKG Interpretation   Ordered and interpreted by Ronald Rodriguez DO  Rhythm: normal sinus   Rate: normal   Axis: normal   ST Segments: no acute change   T Waves: no acute change   Clinical Impression: no acute changes and normal EKG      Assessment & Plan:     68 y.o. female with the following -     Problem List Items Addressed This Visit       Cold feeling     Chronic.  Patient presents for evaluation of intermittent chills and sweating.  She has been evaluated in emergency department and with primary care physician, and has been essentially unremarkable.  Medical history and medications reviewed.  Discussed with patient that lamotrigine can cause symptoms, but unlikely given patient has been on medication for approximately 10 years.  Discussed with patient that symptoms may be related to vasomotor symptoms, but patient has been evaluated by OB/GYN already.  It would be unlikely given that patient has been postmenopausal for approximately 25 years.  -Recommend following up in clinic and keeping log of symptoms  -Continue to monitor symptoms           Chest pain     Patient reports experiencing right upper chest pain intermittently  when she experiences chills.  Overall, symptoms are atypical chest pain.  EKG essentially unremarkable.  She has been evaluated with labs which have been essentially unremarkable.  Vital signs stable.  Physical exam unremarkable.  Continue to monitor.          Relevant Orders    EKG - Clinic Performed (Completed)    Allergies     Patient has symptoms consistent with allergic rhinitis.  She has been evaluated by multiple providers and is currently on Flonase, Singulair, and Zyrtec.  Encourage patient to continue current regimen and will provide referral to allergy for further evaluation.         Relevant Orders    Referral to Allergy

## 2023-06-23 NOTE — ASSESSMENT & PLAN NOTE
Patient has symptoms consistent with allergic rhinitis.  She has been evaluated by multiple providers and is currently on Flonase, Singulair, and Zyrtec.  Encourage patient to continue current regimen and will provide referral to allergy for further evaluation.

## 2023-07-07 ENCOUNTER — OFFICE VISIT (OUTPATIENT)
Dept: MEDICAL GROUP | Facility: CLINIC | Age: 68
End: 2023-07-07
Payer: MEDICAID

## 2023-07-07 VITALS
WEIGHT: 144.4 LBS | HEART RATE: 64 BPM | HEIGHT: 63 IN | BODY MASS INDEX: 25.59 KG/M2 | OXYGEN SATURATION: 93 % | SYSTOLIC BLOOD PRESSURE: 118 MMHG | DIASTOLIC BLOOD PRESSURE: 66 MMHG | TEMPERATURE: 97.8 F

## 2023-07-07 DIAGNOSIS — R68.89 COLD FEELING: ICD-10-CM

## 2023-07-07 DIAGNOSIS — E05.90 HYPERTHYROIDISM: ICD-10-CM

## 2023-07-07 DIAGNOSIS — T78.40XD ALLERGY, SUBSEQUENT ENCOUNTER: ICD-10-CM

## 2023-07-07 DIAGNOSIS — N95.1 VAGINAL DRYNESS, MENOPAUSAL: ICD-10-CM

## 2023-07-07 PROCEDURE — 99214 OFFICE O/P EST MOD 30 MIN: CPT | Mod: GC

## 2023-07-07 PROCEDURE — 3074F SYST BP LT 130 MM HG: CPT | Mod: GC

## 2023-07-07 PROCEDURE — 3078F DIAST BP <80 MM HG: CPT | Mod: GC

## 2023-07-07 RX ORDER — CONJUGATED ESTROGENS 0.62 MG/G
0.5 CREAM VAGINAL DAILY
Qty: 1 EACH | Refills: 1 | Status: SHIPPED | OUTPATIENT
Start: 2023-07-07 | End: 2023-07-22

## 2023-07-07 ASSESSMENT — FIBROSIS 4 INDEX: FIB4 SCORE: 1.44

## 2023-07-07 NOTE — ASSESSMENT & PLAN NOTE
Patient was referred to allergy on previous appointment.  Her appointment is this afternoon at 4 PM.  We will follow-up with the patient in approximately 4 weeks.

## 2023-07-07 NOTE — ASSESSMENT & PLAN NOTE
Patient was previously referred to an allergist.  She states her allergist appointment is this afternoon at 4 PM.  Blood work-up and a skin allergy test are to be performed for a suspected cold urticara allergy.

## 2023-07-07 NOTE — ASSESSMENT & PLAN NOTE
Subclinical hyperthyroidism.  TSH on the low range but within parameters.  Will reevaluate thyroid levels on next appointment approximately 4 weeks from now.

## 2023-07-07 NOTE — PROGRESS NOTES
SUBJECTIVE:     CC: Follow-up     HPI:   Sena presents today with a history of chest pain that occurs when she is cold.  She is currently taking medication for anxiety, depression, and panic attacks.  She is here for a follow-up appointment from her last visit on June 23 with Dr. Ronald Rodriguez who referred her to allergy.  She was previously seeing Dr. Monroe at the [facility] outpatient clinic on [facility] for a thyroid work-up.  She has an appointment with the allergist at 4 PM today.    Additionally she reports postmenopausal vaginal itchiness and dryness.    Past Medical History:  Past Medical History:   Diagnosis Date    Anxiety     Arthralgia     Depression     GERD (gastroesophageal reflux disease)     History of psychiatric symptoms     Hyperlipidemia     Hypertension     Hypothyroid     Menopause     Panic disorder     Vitamin d deficiency        Patient Active Problem List   Diagnosis    Depression with somatization    Hyperthyroidism    Vaginal dryness, menopausal    Vitamin D deficiency    Sore throat    Hyperlipidemia    Elevated glucose    Goiter    Severe anxiety with panic    Moderate episode of recurrent major depressive disorder (HCC)    Language barrier    Cold feeling    Chest pain    Allergies       Surgical History:  Past Surgical History:   Procedure Laterality Date    CHOLECYSTECTOMY  2/2016    CHOLECYSTECTOMY         Family History:  No family history on file.    Social History:  Social History     Tobacco Use    Smoking status: Never     Passive exposure: Never    Smokeless tobacco: Never   Vaping Use    Vaping Use: Never used   Substance Use Topics    Alcohol use: Never    Drug use: Never       Medications:  Current Outpatient Medications on File Prior to Visit   Medication Sig Dispense Refill    montelukast (SINGULAIR) 10 MG Tab TAKE 1 TABLET BY MOUTH EVERY DAY 90 Tablet 1    fluticasone (FLONASE) 50 MCG/ACT nasal spray Administer 1 Spray into affected nostril(S) 2 times a day.  "11.1 mL 1    diazePAM (VALIUM) 5 MG Tab Take 5 mg by mouth 3 times a day.      omeprazole (PRILOSEC) 20 MG delayed-release capsule Take 1 Capsule by mouth every day. 30 Capsule 0    atorvastatin (LIPITOR) 20 MG Tab Take 1 Tablet by mouth every evening. 90 Tablet 0    doxepin (SINEQUAN) 10 MG Cap Take 1 Capsule by mouth every evening. 30 Capsule 0    lamoTRIgine (LAMICTAL) 25 MG Tab Take 25 mg by mouth every day.      mirtazapine (REMERON) 30 MG Tab tablet       PARoxetine (PAXIL) 30 MG Tab Take 20 mg by mouth every day.      lamoTRIgine (LAMICTAL) 100 MG Tab Take 100 mg by mouth at bedtime.  3    cetirizine (ZYRTEC) 10 MG Tab Take 10 mg by mouth every day. (Patient not taking: Reported on 7/7/2023)      Azelastine HCl 137 MCG/SPRAY Solution Administer 1 Spray into affected nostril(S) 2 times a day. ADMINISTER 1 SPRAY INTO AFFECTED NOSTRIL(S) 2 TIMES A DAY . Please dispense for 90 days with one refill (Patient not taking: Reported on 7/7/2023) 90 mL 1     No current facility-administered medications on file prior to visit.       Allergies   Allergen Reactions    Nkda [No Known Drug Allergy]          ROS:   Gen: no fevers/chills, no changes in weight  Eyes: no changes in vision  ENT: no changes in hearing  Pulm: no sob, no cough  CV: no chest pain, no palpitations  GI: no nausea/vomiting, no diarrhea  MSk: no myalgias  Skin: no rash  Neuro: no headaches, no numbness/tingling      OBJECTIVE:       Exam:  /66 (BP Location: Left arm, Patient Position: Sitting, BP Cuff Size: Adult)   Pulse 64   Temp 36.6 °C (97.8 °F) (Temporal)   Ht 1.6 m (5' 3\")   Wt 65.5 kg (144 lb 6.4 oz)   SpO2 93%   BMI 25.58 kg/m²  Body mass index is 25.58 kg/m².    Gen: Alert and oriented, No apparent distress.  Head:  NCAT, EOMI, sclera clear without discharge  Neck: Neck is supple without lymphadenopathy.  Lungs: Normal effort, CTA bilaterally, no wheezes, rhonchi, or rales  CV: Regular rate and rhythm. No murmurs, rubs, or " gallops.  Abd:   Non-distended, soft  Ext: No clubbing, cyanosis, edema.  MSK: Unassisted gait  Derm: No lesions on exposed skin  Psych: normal mood and affect      Labs:  Patient is currently on the low range for TSH (0.54).  Her eGFR (70.7) is low.  Otherwise CMP and CBC were unremarkable.  Patient states that she has a more recent blood work completed approximately a week ago.  It was advised that she obtains a copy of them and has them sent to [facility] clinic.    ASSESSMENT & PLAN:     68 y.o. female with the following -    Problem List Items Addressed This Visit       Hyperthyroidism     Subclinical hyperthyroidism.  TSH on the low range but within parameters.  Will reevaluate thyroid levels on next appointment approximately 4 weeks from now.         Vaginal dryness, menopausal     Patient states that her vaginal mole dryness has been itching.  Patient requested to resume topical estrogen cream.          Cold feeling     Patient was previously referred to an allergist.  She states her allergist appointment is this afternoon at 4 PM.  Blood work-up and a skin allergy test are to be performed for a suspected cold urticara allergy.         Allergies     Patient was referred to allergy on previous appointment.  Her appointment is this afternoon at 4 PM.  We will follow-up with the patient in approximately 4 weeks.          Hyperthyroidism  Subclinical hyperthyroidism.  TSH on the low range but within parameters.  Will reevaluate thyroid levels on next appointment approximately 4 weeks from now.    Cold feeling  Patient was previously referred to an allergist.  She states her allergist appointment is this afternoon at 4 PM.  Blood work-up and a skin allergy test are to be performed for a suspected cold urticara allergy.    Allergies  Patient was referred to allergy on previous appointment.  Her appointment is this afternoon at 4 PM.  We will follow-up with the patient in approximately 4 weeks.    Vaginal dryness,  menopausal  Patient states that her vaginal mole dryness has been itching.  Patient requested to resume topical estrogen cream.      Return in about 4 weeks (around 8/4/2023).    Patel Ramirez MD, MPH, PGY-1  [facility]

## 2023-07-07 NOTE — ASSESSMENT & PLAN NOTE
Patient states that her vaginal mole dryness has been itching.  Patient requested to resume topical estrogen cream.

## 2023-09-15 ENCOUNTER — OFFICE VISIT (OUTPATIENT)
Dept: INTERNAL MEDICINE | Facility: OTHER | Age: 68
End: 2023-09-15
Payer: MEDICAID

## 2023-09-15 VITALS
BODY MASS INDEX: 24.7 KG/M2 | WEIGHT: 139.4 LBS | TEMPERATURE: 97.4 F | HEIGHT: 63 IN | OXYGEN SATURATION: 94 % | SYSTOLIC BLOOD PRESSURE: 130 MMHG | DIASTOLIC BLOOD PRESSURE: 85 MMHG | HEART RATE: 65 BPM

## 2023-09-15 DIAGNOSIS — M85.89 OSTEOPENIA OF MULTIPLE SITES: ICD-10-CM

## 2023-09-15 DIAGNOSIS — J32.8 OTHER CHRONIC SINUSITIS: ICD-10-CM

## 2023-09-15 DIAGNOSIS — Z00.00 PREVENTATIVE HEALTH CARE: ICD-10-CM

## 2023-09-15 DIAGNOSIS — Z79.899 POLYPHARMACY: ICD-10-CM

## 2023-09-15 PROBLEM — J32.9 CHRONIC SINUSITIS: Status: ACTIVE | Noted: 2023-09-15

## 2023-09-15 PROBLEM — M85.80 OSTEOPENIA: Status: ACTIVE | Noted: 2023-09-15

## 2023-09-15 PROCEDURE — 3075F SYST BP GE 130 - 139MM HG: CPT | Performed by: INTERNAL MEDICINE

## 2023-09-15 PROCEDURE — 99214 OFFICE O/P EST MOD 30 MIN: CPT | Performed by: INTERNAL MEDICINE

## 2023-09-15 PROCEDURE — 3079F DIAST BP 80-89 MM HG: CPT | Performed by: INTERNAL MEDICINE

## 2023-09-15 ASSESSMENT — FIBROSIS 4 INDEX: FIB4 SCORE: 1.44

## 2023-09-15 NOTE — PROGRESS NOTES
"        Chief Complaint   Patient presents with    Leg Pain     Left leg    Pharyngitis     Requesting referral to ENT due to having a sore throat for over a year    Medication Management     Requesting Doxepin change to 3mg from 10mg.  Wanting to know if she should continue atorvastatin       HPI: Sena Shepherd is a 67 y.o. female with past medical history as below  who presented to the clinic for the following.      Check thyroid - producing mucus and having sore throat   Patient already saw the ENT doctor.   Marlin Cardona MD ENT - will request for records.   As per patients \"Couldn't find anything\" \" they recommended something for her allergies which is not helping\" - OTC medication. No I did not bring it today   At this time I am also using pills I have from [employer].   Guaifenesin dextromethorphan - ER , but I need something more as it is not controlling the pain and mucus fully. Palate, all over the mouth , along sinuses, sometimes feels like I have fever, pressure around both eyes, maxillary sinus area.   Both   Every other day   Hydroxyzine as need - not everyday   Three times a week     Noise in ears and - water inside   No ear pain   Usually when feeling sore throat   It seems everything is connected   Mucus and rest of symptoms   No hearing loss.     Uses qtip       When I feel depressed I feel pressure on eyes.   psychiatry   Not taking montelukast everyday- I feel sick - I feel pain and mucus   Sometimes I think it does- helps        Lamotrigine 100 half table and one tablet at night   Mirtazapine 15   And paroxetine 20     Valium ?  Doxepin?  Only as needed.       Foot - lateral pain   Happens when she walks   Sometimes foot gets swollen and looks brown   I would say that the pain started 30 years ago - used to work for [employer] - moving up to leg .   No pain on knees   Skin gets numbness-   Walk or run   Depression - not walking or running -   Busy all the time     Yes and no "       Dicyclomine 20 every 6 hours  -cramping in belly   Regula     That's              #Chronic intermittent sore throat nasal pressure and burning.    -ongoing for > 3 months. Started on azelastine spray on the last visit- patient states that she feels much better. Continues to use nasal lavage , flonase and montelukast along with the azelastine.   -ENT referral is currently still processing     #Panic attacks , anxiety   -Patient is currently seeing psychiatrist for this .  -Have to address medications on the next visit - to confirm which one she is on and which one she is not on  -Doxepin refill was placed by Dr. Marion in our clinic and hence refill request has come to us- recommended that patient reach out to Psychiatrist for further refills on that one since she is on other medications that they are prescribing as well.     #Blood work  -Discussed all labs with patient  -Patient wants her hormones to be checked because of the abnormal sensation of cold she occassionally gets as described in my previous note. Patient is unable to fully describe it on this visit.   -States that she bought OTC progesterone from [employer] and has been applying it on her body with relief.   -Patient has had her hormones checked in the past by a physician and was told that she had low hormones.   -Looking back at chart patient has been on patched in 2009, however none since then.        Preventative health   -Patient is due her pneumonia shot - administered in clinic today   -Recommended COVID booster   -Due tdap vaccine this year - will administer on next visit.   -Uptodate on flu  -Dexa scan never done  Colocnoscopy  was recommended to be done every 5years, last one in 2016 - unclear- possibly with GI consultants    All Pap smears in the past were- normal   Mammogram - recently done at [facility]        Social History     Socioeconomic History    Marital status:    Tobacco Use    Smoking status: Never     Passive exposure:  Never    Smokeless tobacco: Never   Vaping Use    Vaping Use: Never used   Substance and Sexual Activity    Alcohol use: Never    Drug use: Never    Sexual activity: Not Currently     Comment: no sexual activity x 10 years              Past medical history of    #Depression with somatization  #Severe anxiety with panic-on lamotrigine, doxepin mirtazapine, paroxetine  #MDD  #history of Hyperthyroidism.  #Vitamin D deficiency.  #Hyperlipidemia.,  Last lipid panel from January 2023 with total cholesterol of 291/triglyceride of 135/HDL of 55/LDL of 204/211-On atorvastatin 20 mg  #Elevated glucose.,  Prediabetes with A1c of 5.9 in July 2019  #Goiter  #Fibroids.  #Liver cysts noted on CT scan abdomen pelvis done in April 2017  #Echocardiogram in 2014 showed ascending aorta with linear density?            Patient Active Problem List    Diagnosis Date Noted    Chest pain 06/23/2023    Allergies 06/23/2023    Cold feeling 03/16/2023    Language barrier 03/11/2023    Severe anxiety with panic 09/27/2017    Moderate episode of recurrent major depressive disorder (HCC) 09/27/2017    Elevated glucose 11/13/2015    Goiter 11/13/2015    Hyperlipidemia 09/08/2015    Sore throat 10/23/2014    Vitamin D deficiency 07/13/2014    Vaginal dryness, menopausal 06/15/2014    Hyperthyroidism 10/04/2012    Depression with somatization 11/09/2009       Current Outpatient Medications   Medication Sig Dispense Refill    cetirizine (ZYRTEC) 10 MG Tab Take 10 mg by mouth every day.      Azelastine HCl 137 MCG/SPRAY Solution Administer 1 Spray into affected nostril(S) 2 times a day. ADMINISTER 1 SPRAY INTO AFFECTED NOSTRIL(S) 2 TIMES A DAY . Please dispense for 90 days with one refill 90 mL 1    montelukast (SINGULAIR) 10 MG Tab TAKE 1 TABLET BY MOUTH EVERY DAY 90 Tablet 1    fluticasone (FLONASE) 50 MCG/ACT nasal spray Administer 1 Spray into affected nostril(S) 2 times a day. 11.1 mL 1    diazePAM (VALIUM) 5 MG Tab Take 5 mg by mouth 3 times a  "day.      omeprazole (PRILOSEC) 20 MG delayed-release capsule Take 1 Capsule by mouth every day. 30 Capsule 0    doxepin (SINEQUAN) 10 MG Cap Take 1 Capsule by mouth every evening. 30 Capsule 0    lamoTRIgine (LAMICTAL) 25 MG Tab Take 25 mg by mouth every day.      mirtazapine (REMERON) 30 MG Tab tablet       PARoxetine (PAXIL) 30 MG Tab Take 20 mg by mouth every day.      lamoTRIgine (LAMICTAL) 100 MG Tab Take 100 mg by mouth at bedtime.  3    atorvastatin (LIPITOR) 20 MG Tab Take 1 Tablet by mouth every evening. (Patient not taking: Reported on 9/15/2023) 90 Tablet 0     No current facility-administered medications for this visit.       ROS: As per HPI. Additional pertinent systems as noted below.  Constitutional:  Negative for fever, positive for feeling cold intermittently followed by sweating   HENT: positive as in hpi   Eyes:  Negative for blurred vision and double vision   Cardiovascular:  Negative for chest pain, palpitations   Respiratory:  Negative for cough, sputum production, shortness of breath       /85 (BP Location: Left arm, Patient Position: Sitting, BP Cuff Size: Adult)   Pulse 65   Temp 36.3 °C (97.4 °F) (Temporal)   Ht 1.6 m (5' 3\")   Wt 63.2 kg (139 lb 6.4 oz)   SpO2 94%   BMI 24.69 kg/m²     Physical Exam   Constitutional:  Well developed, well nourished. Not in acute distress   HENT:  Normocephalic, Atraumatic, Oropharynx is pink and moist. No oral exudates, Nose - marked erythema in nasal turbinates . nO polyps appreciated   Cardiovascular:  Regular rate and rhythm, No pedal edema, Intact distal pulses   Respiratory: Clear to auscultation bilaterally, No use of accessory muscles   Note: I have reviewed all pertinent labs and diagnostic tests associated with this visit with specific comments listed under the assessment and plan below    Assessment and Plan    1. Chronic rhinosinusitis  -Continue currentl manageemnt with montelukast, azelastine, flonase and nasal lavage     - " fluticasone (FLONASE) 50 MCG/ACT nasal spray; Administer 1 Spray into affected nostril(S) 2 times a day.  Dispense: 11.1 mL; Refill: 1    2. Encounter for administration of vaccine    - Pneumococcal Conjugate Vaccine 20-Valent (19 yrs+)    3. Screening for osteoporosis    - DS-BONE DENSITY STUDY (DEXA); Future    4. Encounter for routine adult health examination without abnormal findings  -Patient is due her pneumonia shot - administered in clinic today   -Recommended COVID booster   -Due tdap vaccine this year - will administer on next visit.   -Uptodate on flu  -Dexa scan never done- ordered today   Colocnoscopy  was recommended to be done every 5years, last one in 2016 - unclear- possibly with GI consultants    All Pap smears in the past were- normal   Mammogram - recently done at [facility]- will try to obtain report      5. Cold feeling  Patient is unable to fully describe, partially sounds like a hot flash- discussed non hormonal options for that, however patient is insistent on hormone check. Son present with patient today tells me that he will discuss with mom regarding risks vs benefits we talked about on this visit including risk of VTE with long term use of hormonal formulations when other conservative measures are available and also given that the symptoms are undefined .  We will pursue obgyn referral for hormonal therpay if patient choses that.   Recommended OTC water based lubricant jelly for vaginal dryness     Followup: Return in about 3 months (around 12/15/2023).        Signed by: Bill Monroe M.D.

## 2023-09-15 NOTE — PATIENT INSTRUCTIONS
[facility]      [address]  [facility] [address]  [phone]     Try to NOT take hydroxyzine. It is used for allergy - but as we get older it can cause drowsiness and confusion. So try to avoid it. Do not combine it with doxepin or valium   STOP the dicyclomine BENTYL - it is for stomach cramps.   I am attaching above the information for allergy specialist   Please also follow up with ENT doctor one more time.   You can continue flonase, azelastine spray, montelukast as needed.   The [employer] OTC medication that you are using is normally used for cough- to loosen the phlegm . If it helping you you can take it .     You should try to take Over the counter vitamin D 600-800 units everyday.   Try to take as much calcium as possible in diet   I am attaching information on foods that are rich in calcium .    Follow up 3 months

## 2023-09-15 NOTE — PROGRESS NOTES
Established Patient    Patient Care Team:  Bill Monroe M.D. as PCP - General (Internal Medicine)  Parish Saab M.D. as Consulting Physician (Endocrinology)  Perla Whalen M.D. as Consulting Physician (Endocrinology)    Sena Shepherd is a 68 y.o. female who presents today with the following Chief Complaint(s): Follow up for Diagnoses of Other chronic sinusitis, Osteopenia of multiple sites, Polypharmacy, and Preventative health care were pertinent to this visit.      Past medical history of     #Depression with somatization  #Severe anxiety with panic-on lamotrigine, doxepin mirtazapine, paroxetine  #MDD  #history of Hyperthyroidism.-Last TSH on the lower end of normal  #Vitamin D deficiency.-Last checked in 2019, ordered multiple times after that however was not done  #Hyperlipidemia.,  Last lipid panel from January 2023 with total cholesterol of 291/triglyceride of 135/HDL of 55/LDL of 204/211-On atorvastatin 20 mg-unclear if still taking  #Elevated glucose.,  Prediabetes with A1c of 5.9 in July 2019  #Goiter  #Fibroids.  #Liver cysts noted on CT scan abdomen pelvis done in April 2017  #Echocardiogram in 2014 showed ascending aorta with linear density?    ROS:     Denies any new chest pain or shortness of breath.  No changes to urinary or bowel function.   See HPI.    Past Medical History:   Diagnosis Date    Anxiety     Arthralgia     Depression     GERD (gastroesophageal reflux disease)     History of psychiatric symptoms     Hyperlipidemia     Hypertension     Hypothyroid     Menopause     Panic disorder     Vitamin d deficiency      Social History     Tobacco Use    Smoking status: Never     Passive exposure: Never    Smokeless tobacco: Never   Vaping Use    Vaping Use: Never used   Substance Use Topics    Alcohol use: Never    Drug use: Never     Current Outpatient Medications   Medication Sig Dispense Refill    cetirizine (ZYRTEC) 10 MG Tab Take 10 mg by mouth every day.       "Azelastine HCl 137 MCG/SPRAY Solution Administer 1 Spray into affected nostril(S) 2 times a day. ADMINISTER 1 SPRAY INTO AFFECTED NOSTRIL(S) 2 TIMES A DAY . Please dispense for 90 days with one refill 90 mL 1    montelukast (SINGULAIR) 10 MG Tab TAKE 1 TABLET BY MOUTH EVERY DAY 90 Tablet 1    fluticasone (FLONASE) 50 MCG/ACT nasal spray Administer 1 Spray into affected nostril(S) 2 times a day. 11.1 mL 1    diazePAM (VALIUM) 5 MG Tab Take 5 mg by mouth 3 times a day.      omeprazole (PRILOSEC) 20 MG delayed-release capsule Take 1 Capsule by mouth every day. 30 Capsule 0    doxepin (SINEQUAN) 10 MG Cap Take 1 Capsule by mouth every evening. 30 Capsule 0    lamoTRIgine (LAMICTAL) 25 MG Tab Take 25 mg by mouth every day.      mirtazapine (REMERON) 30 MG Tab tablet       PARoxetine (PAXIL) 30 MG Tab Take 20 mg by mouth every day.      lamoTRIgine (LAMICTAL) 100 MG Tab Take 100 mg by mouth at bedtime.  3    atorvastatin (LIPITOR) 20 MG Tab Take 1 Tablet by mouth every evening. (Patient not taking: Reported on 9/15/2023) 90 Tablet 0     No current facility-administered medications for this visit.       Physical Exam:  /85 (BP Location: Left arm, Patient Position: Sitting, BP Cuff Size: Adult)   Pulse 65   Temp 36.3 °C (97.4 °F) (Temporal)   Ht 1.6 m (5' 3\")   Wt 63.2 kg (139 lb 6.4 oz)   SpO2 94%   BMI 24.69 kg/m²   General: Well developed, well nourished female, in no distress.  Eyes: Conjuntiva without any obvious injection or erythema.   Cardiovascular: Heart is regular with no murmur  Lungs: Clear to auscultation bilaterally. No wheezes, rhonci  heard. End expiratory fine crackles heard at bases  Respiratory effort is normal.  Abd: Soft, non-tender  Ext: No edema      HPI / Assessment / Plan:    1. Other chronic sinusitis  #Chronic intermittent sore throat nasal pressure and burning.    -ongoing for ~ 6 months. Continues to have pain, burning on palate, bridge of nose Continues to use nasal lavage , " flonase and montelukast as needed along with the azelastine. Montelukast sometimes causing side effect as per patient-which is described as worsening of the above symptoms however patient prefers to take it given that it does help sometimes.  Patient is also started taking guaifenesin dextromethorphan tablets over-the-counter which is helping with her symptoms, denies any cough, chills, shortness of breath, wheezing, ear pain.  Patient does feel that she gets feverish at times however has not measured temperature before.  Patient also has noted associated water like sensation inside her years on both sides as well as in the head when the sore throat happens.  -Patient also has hydroxyzine with her which she is not sure what she she takes for.  Explained that it is able to use for allergies however less preferred given her age  -Patient is already seen the ENT doctor who as per patient did not find anything, records not available.  Recommended over-the-counter antiallergy medications to patient.    Plan  -Patient recommended to continue as needed Flonase, azelastine, as needed montelukast and to limit nasal lavage is much as possible.  -I explained that guaifenesin dextromethorphan is used for cough however if bringing symptomatic relief okay to take.  -Recommended to not use hydroxyzine as much as possible explained side effects for age.  -Recommended follow-up with ENT 1 more time to see if there is any further recommendations.  -Provided patient with information regarding the allergy specialist she was referred to in the past so that she can consider other options for allergy given that symptoms continue to remain uncontrolled on all medications listed above.    2. Osteopenia of multiple sites  -Most recent DEXA scan was done in April 2023 which shows osteopenia at both lumbar spine and femoral sides with low FRAX.  -Patient denies any fractures in the past.    Plan  -Patient recommended to increase calcium intake  in diet up to 1200 mg, provided handout on the amount of callus present in various types of foods in [place].  -Also recommended daily vitamin D supplementation.  Last vitamin D from 2019 was 27.5    Other problems not discussed on this visit include  #Panic attacks , anxiety, depression  -Patient is currently seeing psychiatrist for this .  -Patient is 50 mg of lamotrigine in the morning and 100 mg at night total of 150, mirtazapine 50 mg daily and paroxetine 20 mg.  Patient brought in all of these medications however when asked if patient is also taking Valium and doxepin she states that she takes them as needed because they do tend to give her side effects of shakes even though they make her feel relaxed.    3. Polypharmacy  -Explain why sees on the medication she is on.  -She is currently taking montelukast as needed for allergies, azelastine and Flonase spray as needed, hydroxyzine as needed, Valium as needed, doxepin as needed.  -Then on a regular basis she is taking lamotrigine half tablet in the morning and 1 tablet at night a total of 150 mg, mirtazapine 15 mg daily as well as paroxetine 20 mg.  -Did not discuss regarding Lipitor on this visit.  -Did explain to patient that she should try to limit the use of doxepin, Valium and hydroxyzine.  And follow-up for the rest of the medications with psychiatry.  -On previous visit I explained regarding montelukast possibly causing side effects associated with mood/cognition  -Patient was also taking Bentyl which was prescribed previously when she presented to the ER back in 2017 and again in 2022 for abdominal cramping, patient states that she has never had  abdominal pain since her cholecystectomy and did not know that this was taken for abdominal pain.  Recommended to stop    4. Preventative health care    -PCV 20 administered March 2020  -Recommended COVID booster   -Due tdap vaccine this year - will administer on next visit.   -Due for flu vaccine  -Dexa scan  done in April 2023, showing osteopenia at both sides with low FRAX  Colocnoscopy  was recommended to be done every 5years, last one in 2016 - unclear- possibly with GI consultants-not discussed on this visit  All Pap smears in the past were- normal  -Mammogram was done 2 months ago, at [facility], report not available to us yet.  We will try to obtain this      -Of note patient also wanted to discuss about numerous other problems however we do not have ample time to do so, patient instructed to follow-up to follow-up on those problems in 3 months or sooner if needed      Return in about 3 months (around 12/15/2023).    This note was created using voice recognition software.  While every attempt is made to ensure accuracy of transcription, occasionally errors occur.

## 2023-12-09 DIAGNOSIS — J32.9 CHRONIC RHINOSINUSITIS: ICD-10-CM

## 2023-12-11 RX ORDER — MONTELUKAST SODIUM 10 MG/1
10 TABLET ORAL DAILY
Qty: 30 TABLET | Refills: 3 | Status: SHIPPED | OUTPATIENT
Start: 2023-12-11

## 2024-05-22 ENCOUNTER — NON-PROVIDER VISIT (OUTPATIENT)
Dept: OCCUPATIONAL MEDICINE | Facility: CLINIC | Age: 69
End: 2024-05-22

## 2024-05-22 DIAGNOSIS — Z11.1 ENCOUNTER FOR PPD TEST: ICD-10-CM

## 2024-05-22 PROCEDURE — 86580 TB INTRADERMAL TEST: CPT | Performed by: NURSE PRACTITIONER

## 2024-05-24 ENCOUNTER — NON-PROVIDER VISIT (OUTPATIENT)
Dept: OCCUPATIONAL MEDICINE | Facility: CLINIC | Age: 69
End: 2024-05-24

## 2024-05-24 LAB — TB WHEAL 3D P 5 TU DIAM: NORMAL MM

## 2024-08-21 ENCOUNTER — APPOINTMENT (OUTPATIENT)
Dept: INTERNAL MEDICINE | Facility: OTHER | Age: 69
End: 2024-08-21

## 2024-08-21 NOTE — PROGRESS NOTES
No chief complaint on file.      HPI: Sena Shepherd is a 69 y.o. female with past medical history as below  who presented to the clinic for the following.      Past medical history of     #Depression with somatization  #Severe anxiety with panic-on lamotrigine 50 mg of lamotrigine in the morning and 100 mg at night total of 150 , doxepin mirtazapine 50 mg daily , paroxetineparoxetine 20 mg. Patient brought in all of these medications however when asked if patient is also taking Valium and doxepin she states that she takes them as needed because they do tend to give her side effects of shakes even though they make her feel relaxed.    #MDD  #history of Hyperthyroidism.-Last TSH on the lower end of normal  #Vitamin D deficiency.-Last checked in 2019, ordered multiple times after that however was not done  #Hyperlipidemia.,  Last lipid panel from January 2023 with total cholesterol of 291/triglyceride of 135/HDL of 55/LDL of 204/211-On atorvastatin 20 mg-unclear if still taking  #Elevated glucose.,  Prediabetes with A1c of 5.9 in July 2019  #Goiter  #Fibroids.  #Liver cysts noted on CT scan abdomen pelvis done in April 2017  #Echocardiogram in 2014 showed ascending aorta with linear density?  *Other chronic sinusitis  #Chronic intermittent sore throat nasal pressure and burning.    -ongoing since feb or march of 2023, details on previous note    to use nasal lavage , flonase and montelukast as needed along with the azelastine. Montelukast sometimes causing side effect as per patient-which is described as worsening of the above symptoms however patient prefers to take it given that it does help sometimes.  Patient is also started taking guaifenesin dextromethorphan tablets over-the-counter which is helping with her symptoms, denies any cough, chills, shortness of breath, wheezing, ear pain.    Patient also has noted associated water like sensation inside her ears on both sides as well as in the  head when the sore throat happens.  -Patient also has hydroxyzine with her which she is not sure what she she takes for.  Explained that it is able to use for allergies however less preferred given her age  -Patient is already seen the ENT doctor who as per patient did not find anything, records not available.  Recommended over-the-counter antiallergy medications to patient.  On last visit  -Patient recommended to continue as needed Flonase, azelastine, as needed montelukast and to limit nasal lavage is much as possible.  -I explained that guaifenesin dextromethorphan is used for cough however if bringing symptomatic relief okay to take.  -Recommended to not use hydroxyzine as much as possible explained side effects for age.  -Recommended follow-up with ENT 1 more time to see if there is any further recommendations.  -Provided patient with information regarding the allergy specialist she was referred to in the past so that she can consider other options for allergy given that symptoms continue to remain uncontrolled on all medications listed above.  *Osteopenia of multiple sites  -Most recent DEXA scan was done in April 2023 which shows osteopenia at both lumbar spine and femoral sides with low FRAX.  -Patient denies any fractures in the past.   Plan  -Patient recommended to increase calcium intake in diet up to 1200 mg, provided handout on the amount of callus present in various types of foods in [place].  -Also recommended daily vitamin D supplementation.  Last vitamin D from 2019 was 27.5.   Polypharmacy  -Explain why sees on the medication she is on.  -She is currently taking montelukast as needed for allergies, azelastine and Flonase spray as needed, hydroxyzine as needed, Valium as needed, doxepin as needed.  -Then on a regular basis she is taking lamotrigine half tablet in the morning and 1 tablet at night a total of 150 mg, mirtazapine 15 mg daily as well as paroxetine 20 mg.  -Did not discuss regarding  Lipitor on this visit.  -Did explain to patient that she should try to limit the use of doxepin, Valium and hydroxyzine.  And follow-up for the rest of the medications with psychiatry.  -On previous visit I explained regarding montelukast possibly causing side effects associated with mood/cognition  -Patient was also taking Bentyl which was prescribed previously when she presented to the ER back in 2017 and again in 2022 for abdominal cramping, patient states that she has never had  abdominal pain since her cholecystectomy and did not know that this was taken for abdominal pain.  Recommended to stop.   Preventative health care     -PCV 20 administered March 2023  -Recommended COVID booster   -Due tdap vaccine this year - will administer on next visit.   -Dexa scan done in April 2023, showing osteopenia at both sides with low FRAX  Colocnoscopy  was recommended to be done every 5years, last one in 2016 - unclear- possibly with GI consultants-not discussed on this visit  All Pap smears in the past were- normal  -Mammogram was done 2 months ago, at [id number], report not available to us yet.  We will try to obtain this          Patient Active Problem List    Diagnosis Date Noted    Chronic sinusitis 09/15/2023    Osteopenia 09/15/2023    Polypharmacy 09/15/2023    Preventative health care 09/15/2023    Chest pain 06/23/2023    Allergies 06/23/2023    Cold feeling 03/16/2023    Language barrier 03/11/2023    Severe anxiety with panic 09/27/2017    Moderate episode of recurrent major depressive disorder (HCC) 09/27/2017    Elevated glucose 11/13/2015    Goiter 11/13/2015    Hyperlipidemia 09/08/2015    Sore throat 10/23/2014    Vitamin D deficiency 07/13/2014    Vaginal dryness, menopausal 06/15/2014    Hyperthyroidism 10/04/2012    Depression with somatization 11/09/2009       Current Outpatient Medications   Medication Sig Dispense Refill    montelukast (SINGULAIR) 10 MG Tab TAKE 1 TABLET BY MOUTH EVERY DAY 30  Tablet 3    cetirizine (ZYRTEC) 10 MG Tab Take 10 mg by mouth every day.      Azelastine HCl 137 MCG/SPRAY Solution Administer 1 Spray into affected nostril(S) 2 times a day. ADMINISTER 1 SPRAY INTO AFFECTED NOSTRIL(S) 2 TIMES A DAY . Please dispense for 90 days with one refill 90 mL 1    fluticasone (FLONASE) 50 MCG/ACT nasal spray Administer 1 Spray into affected nostril(S) 2 times a day. 11.1 mL 1    diazePAM (VALIUM) 5 MG Tab Take 5 mg by mouth 3 times a day.      omeprazole (PRILOSEC) 20 MG delayed-release capsule Take 1 Capsule by mouth every day. 30 Capsule 0    atorvastatin (LIPITOR) 20 MG Tab Take 1 Tablet by mouth every evening. (Patient not taking: Reported on 9/15/2023) 90 Tablet 0    doxepin (SINEQUAN) 10 MG Cap Take 1 Capsule by mouth every evening. 30 Capsule 0    lamoTRIgine (LAMICTAL) 25 MG Tab Take 25 mg by mouth every day.      mirtazapine (REMERON) 30 MG Tab tablet       PARoxetine (PAXIL) 30 MG Tab Take 20 mg by mouth every day.      lamoTRIgine (LAMICTAL) 100 MG Tab Take 100 mg by mouth at bedtime.  3     No current facility-administered medications for this visit.       ROS: As per HPI. Additional pertinent systems as noted below.  Constitutional:  Negative for fever, chills   HENT:  Negative for ear pain, sore throat, runny nose   Eyes:  Negative for blurred vision and double vision   Cardiovascular:  Negative for chest pain, palpitations   Respiratory:  Negative for cough, sputum production, shortness of breath   Gastrointestinal: Negative for abdominal pain, nausea, vomiting, diarrhea, or blood in stools   Genitourinary: Negative for dysuria, flank pain and hematuria  Skin: Negative for rash, itching  Extremities: Negative for leg swelling   Neurologic: Negative for headaches, dizziness, seizures, weakness   Endo/Heme/Allergies: Negative for polydipsia. Does not bruise/bleed easily  Psychiatric: Negative for suicidal or homicidal ideas     There were no vitals taken for this  visit.    Physical Exam   Constitutional:  Well developed, well nourished. Not in acute distress   HENT:  Normocephalic, Atraumatic, Oropharynx is pink and moist. No oral exudates, Nose normal   Eyes:  EOMI, Conjunctiva normal, No discharge. PERRLA   Neck:  Normal range of motion, No cervical lymphadenopathy. No thyromegaly.   Cardiovascular:  Regular rate and rhythm, No pedal edema, Intact distal pulses   Respiratory: Clear to auscultation bilaterally, No use of accessory muscles   Gastrointestinal: Bowel sounds normal, Soft, No tenderness, No palpable masses/hepatosplenomegaly   Skin: Warm, Dry, No erythema, No rash, no induration.   Musculoskeletal: No cyanosis or clubbing, normal ROM   Neurologic: Alert & oriented x 4, No focal deficits noted, cranial nerves II through X are grossly intact.   Psychiatric: Judgment normal, Mood normal, Memory normal     Note: I have reviewed all pertinent labs and diagnostic tests associated with this visit with specific comments listed under the assessment and plan below    Assessment and Plan  No problem-specific Assessment & Plan notes found for this encounter.      Followup: No follow-ups on file.        Signed by: Bill Monroe M.D.

## 2024-08-21 NOTE — PROGRESS NOTES
Established Patient    Patient Care Team:  Bill Monroe M.D. as PCP - General (Internal Medicine)  Parish Saab M.D. (Inactive) as Consulting Physician (Endocrinology)  Perla Whalen M.D. as Consulting Physician (Endocrinology)    Sena Shepherd is a 68 y.o. female who presents today with the following Chief Complaint(s): Follow up for {There were no encounter diagnoses. (Refresh or delete this SmartLink)}      Past medical history of     #Depression with somatization  #Severe anxiety with panic-on lamotrigine, doxepin mirtazapine, paroxetine  #MDD  #history of Hyperthyroidism.-Last TSH on the lower end of normal  #Vitamin D deficiency.-Last checked in 2019, ordered multiple times after that however was not done  #Hyperlipidemia.,  Last lipid panel from January 2023 with total cholesterol of 291/triglyceride of 135/HDL of 55/LDL of 204/211-On atorvastatin 20 mg-unclear if still taking  #Elevated glucose.,  Prediabetes with A1c of 5.9 in July 2019  #Goiter  #Fibroids.  #Liver cysts noted on CT scan abdomen pelvis done in April 2017  #Echocardiogram in 2014 showed ascending aorta with linear density?    ROS:     Denies any new chest pain or shortness of breath.  No changes to urinary or bowel function.   See HPI.    Past Medical History:   Diagnosis Date    Anxiety     Arthralgia     Depression     GERD (gastroesophageal reflux disease)     History of psychiatric symptoms     Hyperlipidemia     Hypertension     Hypothyroid     Menopause     Panic disorder     Vitamin d deficiency      Social History     Tobacco Use    Smoking status: Never     Passive exposure: Never    Smokeless tobacco: Never   Vaping Use    Vaping status: Never Used   Substance Use Topics    Alcohol use: Never    Drug use: Never     Current Outpatient Medications   Medication Sig Dispense Refill    montelukast (SINGULAIR) 10 MG Tab TAKE 1 TABLET BY MOUTH EVERY DAY 30 Tablet 3    cetirizine (ZYRTEC) 10 MG Tab Take 10 mg by  mouth every day.      Azelastine HCl 137 MCG/SPRAY Solution Administer 1 Spray into affected nostril(S) 2 times a day. ADMINISTER 1 SPRAY INTO AFFECTED NOSTRIL(S) 2 TIMES A DAY . Please dispense for 90 days with one refill 90 mL 1    fluticasone (FLONASE) 50 MCG/ACT nasal spray Administer 1 Spray into affected nostril(S) 2 times a day. 11.1 mL 1    diazePAM (VALIUM) 5 MG Tab Take 5 mg by mouth 3 times a day.      omeprazole (PRILOSEC) 20 MG delayed-release capsule Take 1 Capsule by mouth every day. 30 Capsule 0    atorvastatin (LIPITOR) 20 MG Tab Take 1 Tablet by mouth every evening. (Patient not taking: Reported on 9/15/2023) 90 Tablet 0    doxepin (SINEQUAN) 10 MG Cap Take 1 Capsule by mouth every evening. 30 Capsule 0    lamoTRIgine (LAMICTAL) 25 MG Tab Take 25 mg by mouth every day.      mirtazapine (REMERON) 30 MG Tab tablet       PARoxetine (PAXIL) 30 MG Tab Take 20 mg by mouth every day.      lamoTRIgine (LAMICTAL) 100 MG Tab Take 100 mg by mouth at bedtime.  3     No current facility-administered medications for this visit.       Physical Exam:  There were no vitals taken for this visit.  General: Well developed, well nourished female, in no distress.  Eyes: Conjuntiva without any obvious injection or erythema.   Cardiovascular: Heart is regular with no murmur  Lungs: Clear to auscultation bilaterally. No wheezes, rhonci  heard. End expiratory fine crackles heard at bases  Respiratory effort is normal.  Abd: Soft, non-tender  Ext: No edema      HPI / Assessment / Plan:    1. Other chronic sinusitis  #Chronic intermittent sore throat nasal pressure and burning.    -ongoing for ~ 6 months. Continues to have pain, burning on palate, bridge of nose Continues to use nasal lavage , flonase and montelukast as needed along with the azelastine. Montelukast sometimes causing side effect as per patient-which is described as worsening of the above symptoms however patient prefers to take it given that it does help  sometimes.  Patient is also started taking guaifenesin dextromethorphan tablets over-the-counter which is helping with her symptoms, denies any cough, chills, shortness of breath, wheezing, ear pain.  Patient does feel that she gets feverish at times however has not measured temperature before.  Patient also has noted associated water like sensation inside her years on both sides as well as in the head when the sore throat happens.  -Patient also has hydroxyzine with her which she is not sure what she she takes for.  Explained that it is able to use for allergies however less preferred given her age  -Patient is already seen the ENT doctor who as per patient did not find anything, records not available.  Recommended over-the-counter antiallergy medications to patient.    Plan  -Patient recommended to continue as needed Flonase, azelastine, as needed montelukast and to limit nasal lavage is much as possible.  -I explained that guaifenesin dextromethorphan is used for cough however if bringing symptomatic relief okay to take.  -Recommended to not use hydroxyzine as much as possible explained side effects for age.  -Recommended follow-up with ENT 1 more time to see if there is any further recommendations.  -Provided patient with information regarding the allergy specialist she was referred to in the past so that she can consider other options for allergy given that symptoms continue to remain uncontrolled on all medications listed above.    2. Osteopenia of multiple sites  -Most recent DEXA scan was done in April 2023 which shows osteopenia at both lumbar spine and femoral sides with low FRAX.  -Patient denies any fractures in the past.    Plan  -Patient recommended to increase calcium intake in diet up to 1200 mg, provided handout on the amount of callus present in various types of foods in [place].  -Also recommended daily vitamin D supplementation.  Last vitamin D from 2019 was 27.5    Other problems not discussed  on this visit include  #Panic attacks , anxiety, depression  -Patient is currently seeing psychiatrist for this .  -Patient is 50 mg of lamotrigine in the morning and 100 mg at night total of 150, mirtazapine 50 mg daily and paroxetine 20 mg.  Patient brought in all of these medications however when asked if patient is also taking Valium and doxepin she states that she takes them as needed because they do tend to give her side effects of shakes even though they make her feel relaxed.    3. Polypharmacy  -Explain why sees on the medication she is on.  -She is currently taking montelukast as needed for allergies, azelastine and Flonase spray as needed, hydroxyzine as needed, Valium as needed, doxepin as needed.  -Then on a regular basis she is taking lamotrigine half tablet in the morning and 1 tablet at night a total of 150 mg, mirtazapine 15 mg daily as well as paroxetine 20 mg.  -Did not discuss regarding Lipitor on this visit.  -Did explain to patient that she should try to limit the use of doxepin, Valium and hydroxyzine.  And follow-up for the rest of the medications with psychiatry.  -On previous visit I explained regarding montelukast possibly causing side effects associated with mood/cognition  -Patient was also taking Bentyl which was prescribed previously when she presented to the ER back in 2017 and again in 2022 for abdominal cramping, patient states that she has never had  abdominal pain since her cholecystectomy and did not know that this was taken for abdominal pain.  Recommended to stop    4. Preventative health care    -PCV 20 administered March 2020  -Recommended COVID booster   -Due tdap vaccine this year - will administer on next visit.   -Due for flu vaccine  -Dexa scan done in April 2023, showing osteopenia at both sides with low FRAX  Colocnoscopy  was recommended to be done every 5years, last one in 2016 - unclear- possibly with GI consultants-not discussed on this visit  All Pap smears in the  past were- normal  -Mammogram was done 2 months ago, at [facility], report not available to us yet.  We will try to obtain this      -Of note patient also wanted to discuss about numerous other problems however we do not have ample time to do so, patient instructed to follow-up to follow-up on those problems in 3 months or sooner if needed      No follow-ups on file.    This note was created using voice recognition software.  While every attempt is made to ensure accuracy of transcription, occasionally errors occur.

## 2024-09-04 NOTE — PROGRESS NOTES
Chief Complaint   Patient presents with    Active Symptoms     Patient has a sore throat, body aches, no cough, no fever. Pt states body aches may be due to her depression or allergies    Requesting Labs     Pt reports that she eats too much fruit and she wants a lab order to check if she has diabetes. Pt also wants to check on her kidney function due to the depression medications she is taking    Foot Pain     Pt works long hours    Medication Refill     omeprazole       HPI: Sena Shepherd is a 69 y.o. female with past medical history as below  who presented to the clinic for the following.    Patient wants to check her sugar as she has been eating more sugar recently and is worried that her levels could have been affected.   Patient relates some of her cravings to her mood and depression   Patient reports that she loves grapes   Prediabetes with A1c of 5.9 in July 2019  Sometimes her left upper quadrant hurts when she eats too much - denies any heart burn or acid reflux at this time. Takes omeprazole as needed - maybe once a week   Denies any diarrhea at this time.. reports constipation intermittently   Patient also wants her hormones tested due to temperature fluctuations.   Patient feels hot and cold often - leads to sweating - during day time and at night time.   Discussed getting basic labs first prior to checking hormones,   Patient is on medications that may potentially also cause side effects     *Left ankle pain   -has had it for several years   -Patient reports swelling on lateral ankle   She has noticed that certain shoes helps with the pain  Reports that she has not tried any medications for it   Patient is on her feet a lot as part of her job  Patient also has not tried PT   Patient has not seen a podiatrist   Discussed also regarding getting and Xray of the ankle - patient is not sure where she obtained it - [facility] ? More than 6 months ago - patient ws informed that I was  able to locate xray knee from 2022- patient reports that she has never had any knee problems   On examination there is tenderness near the lateral ligaments , along with swelling but no warmth or redness,   There are good pulses. Painful active ROM        Current meds - patient does not have a list - she wanted me to list the meds - however she was confused about a few of them   Reports that she is not taking doxepin or diazepam - last prescription for Diazepam is fron June for 30 days   Patient is sure that she is taking lamotrigine and mirtazapine   She is not sure about paxil   Will need to review list of meds again on the next visit   There were concerns of polypharmacy on the last visits .   Will also try to obtain psychiatry records on next visit           Past medical history of     #Depression with somatization  #Severe anxiety with panic-on lamotrigine 50 mg of lamotrigine in the morning and 100 mg at night total of 150 , doxepin mirtazapine 50 mg daily , paroxetineparoxetine 20 mg. Patient brought in all of these medications however when asked if patient is also taking Valium and doxepin she states that she takes them as needed because they do tend to give her side effects of shakes even though they make her feel relaxed.    #MDD  #history of Hyperthyroidism.-Last TSH on the lower end of normal  #Vitamin D deficiency.-Last checked in 2019, ordered multiple times after that however was not done  #Hyperlipidemia.,  Last lipid panel from January 2023 with total cholesterol of 291/triglyceride of 135/HDL of 55/LDL of 204/211-On atorvastatin 20 mg-unclear if still taking  #Elevated glucose.,  Prediabetes with A1c of 5.9 in July 2019  #Goiter  #Fibroids.  #Liver cysts noted on CT scan abdomen pelvis done in April 2017  #Echocardiogram in 2014 showed ascending aorta with linear density?  *Other chronic sinusitis  #Chronic intermittent sore throat nasal pressure and burning.    -ongoing since feb or march of 2023,  details on previous note    to use nasal lavage , flonase and montelukast as needed along with the azelastine. Montelukast sometimes causing side effect as per patient-which is described as worsening of the above symptoms however patient prefers to take it given that it does help sometimes.  Patient is also started taking guaifenesin dextromethorphan tablets over-the-counter which is helping with her symptoms, denies any cough, chills, shortness of breath, wheezing, ear pain.    Patient also has noted associated water like sensation inside her ears on both sides as well as in the head when the sore throat happens.  -Patient also has hydroxyzine with her which she is not sure what she she takes for.  Explained that it is able to use for allergies however less preferred given her age  -Patient is already seen the ENT doctor who as per patient did not find anything, records not available.  Recommended over-the-counter antiallergy medications to patient.  On last visit  -Patient recommended to continue as needed Flonase, azelastine, as needed montelukast and to limit nasal lavage is much as possible.  -I explained that guaifenesin dextromethorphan is used for cough however if bringing symptomatic relief okay to take.  -Recommended to not use hydroxyzine as much as possible explained side effects for age.  -Recommended follow-up with ENT 1 more time to see if there is any further recommendations.  -Provided patient with information regarding the allergy specialist she was referred to in the past so that she can consider other options for allergy given that symptoms continue to remain uncontrolled on all medications listed above.  *Osteopenia of multiple sites  -Most recent DEXA scan was done in April 2023 which shows osteopenia at both lumbar spine and femoral sides with low FRAX.  -Patient denies any fractures in the past.   Plan  -Patient recommended to increase calcium intake in diet up to 1200 mg, provided handout on  the amount of callus present in various types of foods in [place].  -Also recommended daily vitamin D supplementation.  Last vitamin D from 2019 was 27.5.   Polypharmacy- discussed  --On previous visit I explained regarding montelukast possibly causing side effects associated with mood/cognition  -Patient was also taking Bentyl which was prescribed previously when she presented to the ER back in 2017 and again in 2022 for abdominal cramping, patient states that she has never had  abdominal pain since her cholecystectomy and did not know that this was taken for abdominal pain.  Recommended to stop.     Preventative health care   -PCV 20 administered March 2023  -Recommended COVID booster   -Due tdap vaccine this year - will administer on next visit.   -Dexa scan done in April 2023, showing osteopenia at both sides with low FRAX  Colocnoscopy  was recommended to be done every 5years, last one in 2016 - unclear- possibly with GI consultants-not discussed on this visit  All Pap smears in the past were- normal  -Mammogram was done 2 months ago, at [facility], report not available to us yet.  We will try to obtain this          Patient Active Problem List    Diagnosis Date Noted    Chronic sinusitis 09/15/2023    Osteopenia 09/15/2023    Polypharmacy 09/15/2023    Preventative health care 09/15/2023    Chest pain 06/23/2023    Allergies 06/23/2023    Cold feeling 03/16/2023    Language barrier 03/11/2023    Severe anxiety with panic 09/27/2017    Moderate episode of recurrent major depressive disorder (HCC) 09/27/2017    Elevated glucose 11/13/2015    Goiter 11/13/2015    Hyperlipidemia 09/08/2015    Sore throat 10/23/2014    Vitamin D deficiency 07/13/2014    Vaginal dryness, menopausal 06/15/2014    Hyperthyroidism 10/04/2012    Depression with somatization 11/09/2009       Current Outpatient Medications   Medication Sig Dispense Refill    cetirizine (ZYRTEC) 10 MG Tab Take 10 mg by mouth every day.       "Azelastine HCl 137 MCG/SPRAY Solution Administer 1 Spray into affected nostril(S) 2 times a day. ADMINISTER 1 SPRAY INTO AFFECTED NOSTRIL(S) 2 TIMES A DAY . Please dispense for 90 days with one refill 90 mL 1    fluticasone (FLONASE) 50 MCG/ACT nasal spray Administer 1 Spray into affected nostril(S) 2 times a day. 11.1 mL 1    omeprazole (PRILOSEC) 20 MG delayed-release capsule Take 1 Capsule by mouth every day. 30 Capsule 0    lamoTRIgine (LAMICTAL) 25 MG Tab Take 25 mg by mouth every day.      mirtazapine (REMERON) 30 MG Tab tablet       PARoxetine (PAXIL) 30 MG Tab Take 20 mg by mouth every day.      lamoTRIgine (LAMICTAL) 100 MG Tab Take 100 mg by mouth at bedtime.  3    montelukast (SINGULAIR) 10 MG Tab TAKE 1 TABLET BY MOUTH EVERY DAY (Patient not taking: Reported on 9/5/2024) 30 Tablet 3    diazePAM (VALIUM) 5 MG Tab Take 5 mg by mouth 3 times a day. (Patient not taking: Reported on 9/5/2024)      atorvastatin (LIPITOR) 20 MG Tab Take 1 Tablet by mouth every evening. (Patient not taking: Reported on 9/15/2023) 90 Tablet 0    doxepin (SINEQUAN) 10 MG Cap Take 1 Capsule by mouth every evening. (Patient not taking: Reported on 9/5/2024) 30 Capsule 0     No current facility-administered medications for this visit.       ROS: As per HPI. Additional pertinent systems as noted below.  Constitutional:  Negative for fever, chills   Gastrointestinal: Negative for  nausea, vomiting, diarrhea, or blood in stools. Positive as in hpi    Extremity : positive as in hpi     /73 (BP Location: Left arm, Patient Position: Sitting, BP Cuff Size: Adult)   Pulse 63   Temp 36 °C (96.8 °F) (Temporal)   Ht 1.575 m (5' 2\")   Wt 64.9 kg (143 lb)   SpO2 94%   BMI 26.16 kg/m²     Physical Exam   Constitutional:  Well developed, well nourished. Not in acute distress   Cardiovascular:  Regular rate and rhythm,    Respiratory: Clear to auscultation bilaterally, No use of accessory muscles   Extremities : On examination there is " tenderness near the lateral ligaments , along with swelling but no warmth or redness,   There are good pulses. Painful active ROM     Note: I have reviewed all pertinent labs and diagnostic tests associated with this visit with specific comments listed under the assessment and plan below    Assessment and Plan    1. Prediabetes  History of .   Last a1 c from 2019   Repeat ordered today   - HEMOGLOBIN A1C; Future    2. Chronic pain of left ankle  Suspect ligament injury   Encouraged to follow up with podiatry given that certain foot wear is improving symptoms   Xray ordered given chronicity of symptoms   Referred also to Pt   Recommended to use Voltaren - and as needed tylenol /advil  - Referral to Podiatry  - Referral to Physical Therapy  - DX-ANKLE 3+ VIEWS LEFT; Future    3. Hyperthyroidism  History of - repeat labs   Patient reports temperature dysregulation which could be from thyroid dysregulation vs post menopausal symptoms vs medication side effects - will readdress after labs    4. Hyperlipidemia, unspecified hyperlipidemia type    - Lipid Profile; Future    5. Vitamin D deficiency    - VITAMIN D,25 HYDROXY (DEFICIENCY); Future    6. Osteopenia of multiple sites    - Comp Metabolic Panel; Future    7. Constipation, unspecified constipation type    - TSH WITH REFLEX TO FT4; Future     Followup: Return in about 2 months (around 11/5/2024).        Signed by: Bill Monroe M.D.

## 2024-09-05 ENCOUNTER — OFFICE VISIT (OUTPATIENT)
Dept: INTERNAL MEDICINE | Facility: OTHER | Age: 69
End: 2024-09-05
Payer: MEDICAID

## 2024-09-05 VITALS
WEIGHT: 143 LBS | SYSTOLIC BLOOD PRESSURE: 127 MMHG | OXYGEN SATURATION: 94 % | HEIGHT: 62 IN | TEMPERATURE: 96.8 F | BODY MASS INDEX: 26.31 KG/M2 | DIASTOLIC BLOOD PRESSURE: 73 MMHG | HEART RATE: 63 BPM

## 2024-09-05 DIAGNOSIS — E78.5 HYPERLIPIDEMIA, UNSPECIFIED HYPERLIPIDEMIA TYPE: ICD-10-CM

## 2024-09-05 DIAGNOSIS — K59.00 CONSTIPATION, UNSPECIFIED CONSTIPATION TYPE: ICD-10-CM

## 2024-09-05 DIAGNOSIS — R73.03 PREDIABETES: Primary | ICD-10-CM

## 2024-09-05 DIAGNOSIS — M25.572 CHRONIC PAIN OF LEFT ANKLE: ICD-10-CM

## 2024-09-05 DIAGNOSIS — G89.29 CHRONIC PAIN OF LEFT ANKLE: ICD-10-CM

## 2024-09-05 DIAGNOSIS — M85.89 OSTEOPENIA OF MULTIPLE SITES: ICD-10-CM

## 2024-09-05 DIAGNOSIS — E05.90 HYPERTHYROIDISM: ICD-10-CM

## 2024-09-05 DIAGNOSIS — E55.9 VITAMIN D DEFICIENCY: ICD-10-CM

## 2024-09-05 PROCEDURE — 3074F SYST BP LT 130 MM HG: CPT | Performed by: INTERNAL MEDICINE

## 2024-09-05 PROCEDURE — 3078F DIAST BP <80 MM HG: CPT | Performed by: INTERNAL MEDICINE

## 2024-09-05 PROCEDURE — 99214 OFFICE O/P EST MOD 30 MIN: CPT | Performed by: INTERNAL MEDICINE

## 2024-09-05 ASSESSMENT — FIBROSIS 4 INDEX: FIB4 SCORE: 1.46

## 2024-10-02 ENCOUNTER — TELEPHONE (OUTPATIENT)
Dept: INTERNAL MEDICINE | Facility: OTHER | Age: 69
End: 2024-10-02
Payer: MEDICARE

## 2024-10-02 DIAGNOSIS — E87.20 ACIDOSIS: ICD-10-CM

## 2024-10-02 DIAGNOSIS — E87.8 HYPERCHLOREMIA: ICD-10-CM

## 2024-11-07 ENCOUNTER — APPOINTMENT (OUTPATIENT)
Dept: INTERNAL MEDICINE | Facility: OTHER | Age: 69
End: 2024-11-07
Payer: MEDICARE

## 2024-11-07 NOTE — PROGRESS NOTES
No chief complaint on file.      HPI: Sena Shepherd is a 69 y.o. female with past medical history as below  who presented to the clinic for the following.    Patient wants to check her sugar as she has been eating more sugar recently and is worried that her levels could have been affected.   Patient relates some of her cravings to her mood and depression   Patient reports that she loves grapes   Prediabetes with A1c of 5.9 in July 2019> repeat in September 2024 was also 5.9   Sometimes her left upper quadrant hurts when she eats too much - denies any heart burn or acid reflux at this time. Takes omeprazole as needed - maybe once a week   Denies any diarrhea at this time.. reports constipation intermittently   Patient also wants her hormones tested due to temperature fluctuations.   Patient feels hot and cold often - leads to sweating - during day time and at night time.   Discussed getting basic labs first prior to checking hormones,   Patient is on medications that may potentially also cause side effects     *Left ankle pain   -has had it for several years   -Patient reports swelling on lateral ankle   She has noticed that certain shoes helps with the pain  Reports that she has not tried any medications for it   Patient is on her feet a lot as part of her job  Patient also has not tried PT   Patient has not seen a podiatrist   Discussed also regarding getting and Xray of the ankle - patient is not sure where she obtained it - [facility] ? More than 6 months ago - patient ws informed that I was able to locate xray knee from 2022- patient reports that she has never had any knee problems   On examination there is tenderness near the lateral ligaments , along with swelling but no warmth or redness,   There are good pulses. Painful active ROM  Referred to PT, podiatry and for an xray         Current meds - patient does not have a list - she wanted me to list the meds - however she was confused  about a few of them   Reports that she is not taking doxepin or diazepam - last prescription for Diazepam is fron June for 30 days   Patient is sure that she is taking lamotrigine and mirtazapine   She is not sure about paxil   Will need to review list of meds again on the next visit   There were concerns of polypharmacy on the last visits .   Will also try to obtain psychiatry records on next visit           Past medical history of     #Depression with somatization  #Severe anxiety with panic-on lamotrigine 50 mg of lamotrigine in the morning and 100 mg at night total of 150 , doxepin mirtazapine 50 mg daily ,paroxetine 20 mg. Patient brought in all of these medications previously - not on latest visit  however when asked if patient is also taking Valium and doxepin she states that she takes them as needed because they do tend to give her side effects of shakes even though they make her feel relaxed.  - denies taking them currently - recommended that she bring in her meds everytime - will also request for psychiatry records.   #MDD  #history of Hyperthyroidism.-Last TSH on the lower end of normal, repeat TSH on lower end of normal as well -from September 2024   #Vitamin D deficiency.-normal when checked in September 2024   #Hyperlipidemia.,  Last lipid panel from January 2023 with total cholesterol of 291/triglyceride of 135/HDL of 55/LDL of 204/211-On atorvastatin 20 mg-unclear if still taking  -lipid panel from September 2024 showed total cholesterol/triglyceride/HDL/LDL of 255/113/71/164  #Goiter  #Fibroids.  #Liver cysts noted on CT scan abdomen pelvis done in April 2017  #Echocardiogram in 2014 showed ascending aorta with linear density?  *Other chronic sinusitis  #Chronic intermittent sore throat nasal pressure and burning.    -ongoing since feb or march of 2023, details on previous note    to use nasal lavage , flonase and montelukast as needed along with the azelastine. Montelukast sometimes causing side  effect as per patient-which is described as worsening of the above symptoms however patient prefers to take it given that it does help sometimes.  Patient is also started taking guaifenesin dextromethorphan tablets over-the-counter which is helping with her symptoms, denies any cough, chills, shortness of breath, wheezing, ear pain.    Patient also has noted associated water like sensation inside her ears on both sides as well as in the head when the sore throat happens.  -Patient also has hydroxyzine with her which she is not sure what she she takes for.  Explained that it is able to use for allergies however less preferred given her age  -Patient is already seen the ENT doctor who as per patient did not find anything, records not available.  Recommended over-the-counter antiallergy medications to patient.  On last visit  -Patient recommended to continue as needed Flonase, azelastine, as needed montelukast and to limit nasal lavage is much as possible.  -I explained that guaifenesin dextromethorphan is used for cough however if bringing symptomatic relief okay to take.  -Recommended to not use hydroxyzine as much as possible explained side effects for age.  -Recommended follow-up with ENT 1 more time to see if there is any further recommendations.  -Provided patient with information regarding the allergy specialist she was referred to in the past so that she can consider other options for allergy given that symptoms continue to remain uncontrolled on all medications listed above.  *Osteopenia of multiple sites  -Most recent DEXA scan was done in April 2023 which shows osteopenia at both lumbar spine and femoral sides with low FRAX.  -Patient denies any fractures in the past.   Plan  -Patient recommended to increase calcium intake in diet up to 1200 mg, provided handout on the amount of callus present in various types of foods in [place].  -Also recommended daily vitamin D supplementation.  Last vitamin D from  September 2024 was 40.8  Polypharmacy- discussed  --On previous visit I explained regarding montelukast possibly causing side effects associated with mood/cognition  -Patient was also taking Bentyl which was prescribed previously when she presented to the ER back in 2017 and again in 2022 for abdominal cramping, patient states that she has never had  abdominal pain since her cholecystectomy and did not know that this was taken for abdominal pain.  Recommended to stop.     Preventative health care   -PCV 20 administered March 2023  -Recommended COVID booster   -Due tdap vaccine this year - will administer on next visit.   -Dexa scan done in April 2023, showing osteopenia at both sides with low FRAX  Colocnoscopy  was recommended to be done every 5years, last one in 2016 - unclear- possibly with GI consultants-not discussed on this visit  All Pap smears in the past were- normal  -Mammogram was done  at [facility], report not available to us yet.  We will try to obtain this          Patient Active Problem List    Diagnosis Date Noted    Chronic sinusitis 09/15/2023    Osteopenia 09/15/2023    Polypharmacy 09/15/2023    Preventative health care 09/15/2023    Chest pain 06/23/2023    Allergies 06/23/2023    Cold feeling 03/16/2023    Language barrier 03/11/2023    Severe anxiety with panic 09/27/2017    Moderate episode of recurrent major depressive disorder (HCC) 09/27/2017    Elevated glucose 11/13/2015    Goiter 11/13/2015    Hyperlipidemia 09/08/2015    Sore throat 10/23/2014    Vitamin D deficiency 07/13/2014    Vaginal dryness, menopausal 06/15/2014    Hyperthyroidism 10/04/2012    Depression with somatization 11/09/2009       Current Outpatient Medications   Medication Sig Dispense Refill    montelukast (SINGULAIR) 10 MG Tab TAKE 1 TABLET BY MOUTH EVERY DAY (Patient not taking: Reported on 9/5/2024) 30 Tablet 3    cetirizine (ZYRTEC) 10 MG Tab Take 10 mg by mouth every day.      Azelastine HCl 137 MCG/SPRAY  Solution Administer 1 Spray into affected nostril(S) 2 times a day. ADMINISTER 1 SPRAY INTO AFFECTED NOSTRIL(S) 2 TIMES A DAY . Please dispense for 90 days with one refill 90 mL 1    fluticasone (FLONASE) 50 MCG/ACT nasal spray Administer 1 Spray into affected nostril(S) 2 times a day. 11.1 mL 1    diazePAM (VALIUM) 5 MG Tab Take 5 mg by mouth 3 times a day. (Patient not taking: Reported on 9/5/2024)      omeprazole (PRILOSEC) 20 MG delayed-release capsule Take 1 Capsule by mouth every day. 30 Capsule 0    atorvastatin (LIPITOR) 20 MG Tab Take 1 Tablet by mouth every evening. (Patient not taking: Reported on 9/15/2023) 90 Tablet 0    doxepin (SINEQUAN) 10 MG Cap Take 1 Capsule by mouth every evening. (Patient not taking: Reported on 9/5/2024) 30 Capsule 0    lamoTRIgine (LAMICTAL) 25 MG Tab Take 25 mg by mouth every day.      mirtazapine (REMERON) 30 MG Tab tablet       PARoxetine (PAXIL) 30 MG Tab Take 20 mg by mouth every day.      lamoTRIgine (LAMICTAL) 100 MG Tab Take 100 mg by mouth at bedtime.  3     No current facility-administered medications for this visit.       ROS: As per HPI. Additional pertinent systems as noted below.  Constitutional:  Negative for fever, chills   Gastrointestinal: Negative for  nausea, vomiting, diarrhea, or blood in stools. Positive as in hpi    Extremity : positive as in hpi     There were no vitals taken for this visit.    Physical Exam   Constitutional:  Well developed, well nourished. Not in acute distress   Cardiovascular:  Regular rate and rhythm,    Respiratory: Clear to auscultation bilaterally, No use of accessory muscles   Extremities : On examination there is tenderness near the lateral ligaments , along with swelling but no warmth or redness,   There are good pulses. Painful active ROM     Note: I have reviewed all pertinent labs and diagnostic tests associated with this visit with specific comments listed under the assessment and plan below    Assessment and Plan    1.  Prediabetes  History of .   Last a1 c from 2019   Repeat ordered today   - HEMOGLOBIN A1C; Future    2. Chronic pain of left ankle  Suspect ligament injury   Encouraged to follow up with podiatry given that certain foot wear is improving symptoms   Xray ordered given chronicity of symptoms   Referred also to Pt   Recommended to use Voltaren - and as needed tylenol /advil  - Referral to Podiatry  - Referral to Physical Therapy  - DX-ANKLE 3+ VIEWS LEFT; Future    3. Hyperthyroidism  History of - repeat labs   Patient reports temperature dysregulation which could be from thyroid dysregulation vs post menopausal symptoms vs medication side effects - will readdress after labs    4. Hyperlipidemia, unspecified hyperlipidemia type    - Lipid Profile; Future    5. Vitamin D deficiency    - VITAMIN D,25 HYDROXY (DEFICIENCY); Future    6. Osteopenia of multiple sites    - Comp Metabolic Panel; Future    7. Constipation, unspecified constipation type    - TSH WITH REFLEX TO FT4; Future     Followup: No follow-ups on file.        Signed by: Bill Monroe M.D.

## 2024-12-19 ENCOUNTER — APPOINTMENT (OUTPATIENT)
Dept: PHYSICAL THERAPY | Facility: REHABILITATION | Age: 69
End: 2024-12-19
Attending: INTERNAL MEDICINE
Payer: MEDICARE

## 2025-01-07 ENCOUNTER — APPOINTMENT (OUTPATIENT)
Dept: PHYSICAL THERAPY | Facility: REHABILITATION | Age: 70
End: 2025-01-07
Attending: INTERNAL MEDICINE
Payer: MEDICARE

## 2025-01-14 ENCOUNTER — APPOINTMENT (OUTPATIENT)
Dept: PHYSICAL THERAPY | Facility: REHABILITATION | Age: 70
End: 2025-01-14
Attending: INTERNAL MEDICINE
Payer: MEDICARE

## 2025-01-21 ENCOUNTER — APPOINTMENT (OUTPATIENT)
Dept: PHYSICAL THERAPY | Facility: REHABILITATION | Age: 70
End: 2025-01-21
Attending: INTERNAL MEDICINE
Payer: MEDICARE

## 2025-01-28 ENCOUNTER — APPOINTMENT (OUTPATIENT)
Dept: PHYSICAL THERAPY | Facility: REHABILITATION | Age: 70
End: 2025-01-28
Attending: INTERNAL MEDICINE
Payer: MEDICARE

## 2025-02-04 ENCOUNTER — APPOINTMENT (OUTPATIENT)
Dept: PHYSICAL THERAPY | Facility: REHABILITATION | Age: 70
End: 2025-02-04
Attending: INTERNAL MEDICINE
Payer: MEDICARE

## 2025-02-11 ENCOUNTER — APPOINTMENT (OUTPATIENT)
Dept: PHYSICAL THERAPY | Facility: REHABILITATION | Age: 70
End: 2025-02-11
Attending: INTERNAL MEDICINE
Payer: MEDICARE

## 2025-02-18 ENCOUNTER — APPOINTMENT (OUTPATIENT)
Dept: PHYSICAL THERAPY | Facility: REHABILITATION | Age: 70
End: 2025-02-18
Attending: INTERNAL MEDICINE
Payer: MEDICARE

## 2025-02-25 ENCOUNTER — APPOINTMENT (OUTPATIENT)
Dept: PHYSICAL THERAPY | Facility: REHABILITATION | Age: 70
End: 2025-02-25
Attending: INTERNAL MEDICINE
Payer: MEDICARE

## 2025-03-04 ENCOUNTER — APPOINTMENT (OUTPATIENT)
Dept: PHYSICAL THERAPY | Facility: REHABILITATION | Age: 70
End: 2025-03-04
Attending: INTERNAL MEDICINE
Payer: MEDICARE

## 2025-03-11 ENCOUNTER — APPOINTMENT (OUTPATIENT)
Dept: PHYSICAL THERAPY | Facility: REHABILITATION | Age: 70
End: 2025-03-11
Attending: INTERNAL MEDICINE
Payer: MEDICARE

## 2025-03-19 ENCOUNTER — APPOINTMENT (OUTPATIENT)
Dept: URGENT CARE | Facility: PHYSICIAN GROUP | Age: 70
End: 2025-03-19
Payer: COMMERCIAL

## 2025-04-11 ENCOUNTER — NON-PROVIDER VISIT (OUTPATIENT)
Dept: OCCUPATIONAL MEDICINE | Facility: CLINIC | Age: 70
End: 2025-04-11

## 2025-04-11 DIAGNOSIS — Z11.1 ENCOUNTER FOR PPD SKIN TEST READING: ICD-10-CM

## 2025-04-11 PROCEDURE — 86580 TB INTRADERMAL TEST: CPT | Performed by: PREVENTIVE MEDICINE

## 2025-04-11 NOTE — PROGRESS NOTES
PPD skin test placement step 2 . Patient read and understood instructions when and what time to come back.

## 2025-04-14 ENCOUNTER — NON-PROVIDER VISIT (OUTPATIENT)
Dept: OCCUPATIONAL MEDICINE | Facility: CLINIC | Age: 70
End: 2025-04-14

## 2025-04-14 LAB — TB WHEAL 3D P 5 TU DIAM: NORMAL MM

## 2025-04-14 NOTE — PROGRESS NOTES
Sena Maria is a 69 y.o. female here for a non-provider visit for PPD reading -- Step 1 of 2.      1.  Resulted in Epic under enter/edit results? Yes   2.  TB evaluation questionnaire scanned into chart and original given to patient?Yes      3. Was induration greater than 0 mm? No.    If Step 1 of 2, when is patient returning for second step (delete if N/A): Advised patient to return for 2nd placement in 7 days.    Routed to PCP? No

## 2025-04-18 ENCOUNTER — NON-PROVIDER VISIT (OUTPATIENT)
Dept: OCCUPATIONAL MEDICINE | Facility: CLINIC | Age: 70
End: 2025-04-18

## 2025-04-18 DIAGNOSIS — Z11.1 ENCOUNTER FOR PPD TEST: Primary | ICD-10-CM

## 2025-04-18 PROCEDURE — 86580 TB INTRADERMAL TEST: CPT | Performed by: PREVENTIVE MEDICINE

## 2025-04-20 ENCOUNTER — NON-PROVIDER VISIT (OUTPATIENT)
Dept: URGENT CARE | Facility: CLINIC | Age: 70
End: 2025-04-20

## 2025-04-20 ENCOUNTER — NON-PROVIDER VISIT (OUTPATIENT)
Dept: URGENT CARE | Facility: CLINIC | Age: 70
End: 2025-04-20
Payer: MEDICARE

## 2025-04-20 LAB — TB WHEAL 3D P 5 TU DIAM: 0 MM

## 2025-05-22 ENCOUNTER — APPOINTMENT (OUTPATIENT)
Dept: URGENT CARE | Facility: CLINIC | Age: 70
End: 2025-05-22
Payer: MEDICARE